# Patient Record
Sex: FEMALE | Race: BLACK OR AFRICAN AMERICAN | HISPANIC OR LATINO | Employment: FULL TIME | URBAN - METROPOLITAN AREA
[De-identification: names, ages, dates, MRNs, and addresses within clinical notes are randomized per-mention and may not be internally consistent; named-entity substitution may affect disease eponyms.]

---

## 2018-10-26 ENCOUNTER — OFFICE VISIT (OUTPATIENT)
Dept: OBSTETRICS AND GYNECOLOGY | Facility: CLINIC | Age: 43
End: 2018-10-26
Attending: OBSTETRICS & GYNECOLOGY
Payer: COMMERCIAL

## 2018-10-26 VITALS
WEIGHT: 188.25 LBS | DIASTOLIC BLOOD PRESSURE: 80 MMHG | BODY MASS INDEX: 32.14 KG/M2 | HEIGHT: 64 IN | SYSTOLIC BLOOD PRESSURE: 118 MMHG

## 2018-10-26 DIAGNOSIS — N85.2 UTERINE ENLARGEMENT: ICD-10-CM

## 2018-10-26 DIAGNOSIS — Z12.4 ENCOUNTER FOR PAPANICOLAOU SMEAR FOR CERVICAL CANCER SCREENING: ICD-10-CM

## 2018-10-26 DIAGNOSIS — Z01.411 ENCOUNTER FOR GYNECOLOGICAL EXAMINATION (GENERAL) (ROUTINE) WITH ABNORMAL FINDINGS: Primary | ICD-10-CM

## 2018-10-26 DIAGNOSIS — Z12.39 BREAST CANCER SCREENING: ICD-10-CM

## 2018-10-26 DIAGNOSIS — Z31.69 ENCOUNTER FOR PRECONCEPTION CONSULTATION: ICD-10-CM

## 2018-10-26 DIAGNOSIS — A64 STD (FEMALE): ICD-10-CM

## 2018-10-26 PROCEDURE — 88175 CYTOPATH C/V AUTO FLUID REDO: CPT

## 2018-10-26 PROCEDURE — 87491 CHLMYD TRACH DNA AMP PROBE: CPT

## 2018-10-26 PROCEDURE — 87624 HPV HI-RISK TYP POOLED RSLT: CPT

## 2018-10-26 PROCEDURE — 99999 PR PBB SHADOW E&M-NEW PATIENT-LVL IV: CPT | Mod: PBBFAC,,, | Performed by: OBSTETRICS & GYNECOLOGY

## 2018-10-26 PROCEDURE — 99386 PREV VISIT NEW AGE 40-64: CPT | Mod: S$GLB,,, | Performed by: OBSTETRICS & GYNECOLOGY

## 2018-10-26 RX ORDER — DEXTROAMPHETAMINE SACCHARATE, AMPHETAMINE ASPARTATE, DEXTROAMPHETAMINE SULFATE AND AMPHETAMINE SULFATE 2.5; 2.5; 2.5; 2.5 MG/1; MG/1; MG/1; MG/1
1 TABLET ORAL 2 TIMES DAILY
Refills: 0 | COMMUNITY
Start: 2018-10-02 | End: 2019-02-06

## 2018-10-26 RX ORDER — FLUOXETINE HYDROCHLORIDE 40 MG/1
80 CAPSULE ORAL DAILY
COMMUNITY
End: 2022-11-10

## 2018-10-26 RX ORDER — ARIPIPRAZOLE 5 MG/1
1 TABLET ORAL DAILY
Refills: 0 | COMMUNITY
Start: 2018-10-02 | End: 2019-02-06

## 2018-10-26 NOTE — PROGRESS NOTES
Subjective:       Patient ID: Desiree Salas is a 43 y.o. female.    Chief Complaint:  Annual Exam (wants info on Fertility and PCP, last pap and hpv normal 1 year ago, lmp 10/19/18, mmg done 18 months ago normal per patient, wants STD check)      History of Present Illness  43 year old here for annual.  Patient reports having regular periods every month lasting 5-7 days.  Not having bad pains or heaviness.  Denies breast concerns.  No pelvic pain.  Patent is 43 and interested in MFM counseling for pregnancy based on her medications.  Patient considering IUI and will follow up with Dr. Jesus.  Reports moods to be much improved on these mediations.  Patient still is having trouble with sleeping but is exercising and trying to reduce stress.      GYN & OB History  Patient's last menstrual period was 10/19/2018.   Date of Last Pap: No result found    OB History    Para Term  AB Living   0 0 0 0 0 0   SAB TAB Ectopic Multiple Live Births   0 0 0 0 0             Past Medical History:   Diagnosis Date    Mental disorder        Past Surgical History:   Procedure Laterality Date    CYST REMOVAL      DILATION AND CURETTAGE OF UTERUS         Review of Systems  Review of Systems   Constitutional: Negative for fatigue.   Respiratory: Negative for shortness of breath.    Cardiovascular: Negative for chest pain.   Gastrointestinal: Negative for abdominal pain, constipation, diarrhea and nausea.   Genitourinary: Negative for dyspareunia, dysuria, menstrual problem, pelvic pain and vaginal bleeding.   Musculoskeletal: Positive for arthralgias. Negative for back pain.   Skin: Negative for rash.   Neurological: Negative for headaches.   Hematological: Negative for adenopathy.   Psychiatric/Behavioral: Positive for sleep disturbance. Negative for dysphoric mood. The patient is not nervous/anxious.         Objective:   Physical Exam:   Constitutional: She is oriented to person, place, and time. She appears  well-developed and well-nourished.      Neck: No thyromegaly present.    Cardiovascular: Normal rate.     Pulmonary/Chest: Effort normal and breath sounds normal. Right breast exhibits no mass, no nipple discharge, no skin change, no tenderness and no bleeding. Left breast exhibits no mass, no nipple discharge, no skin change, no tenderness and no bleeding.       1 cm area of healing blister that patient thinks was caused by a bug  No mass         Abdominal: Soft. Normal appearance and bowel sounds are normal. She exhibits no distension and no mass. There is no tenderness. There is no rebound and no guarding.     Genitourinary: Vagina normal. Pelvic exam was performed with patient supine. There is no rash, tenderness, lesion or injury on the right labia. There is no rash, tenderness, lesion or injury on the left labia. Uterus is enlarged (mild ). Uterus is not fixed and not tender. Cervix is normal. Right adnexum displays no mass, no tenderness and no fullness. Left adnexum displays no mass, no tenderness and no fullness. No erythema, tenderness, rectocele, cystocele or unspecified prolapse of vaginal walls in the vagina. No signs of injury around the vagina. No vaginal discharge found. Cervix exhibits no motion tenderness, no discharge and no friability.           Musculoskeletal: Normal range of motion and moves all extremeties.      Lymphadenopathy:     She has no axillary adenopathy.        Right: No supraclavicular adenopathy present.        Left: No supraclavicular adenopathy present.    Neurological: She is alert and oriented to person, place, and time.    Skin: Skin is warm and dry.    Psychiatric: She has a normal mood and affect. Her behavior is normal. Judgment normal.        Assessment/ Plan:     Encounter for gynecological examination (general) (routine) with abnormal findings    STD (female)  -     C. trachomatis/N. gonorrhoeae by AMP DNA    Encounter for Papanicolaou smear for cervical cancer  screening  -     Liquid-based pap smear, screening  -     HPV High Risk Genotypes, PCR    Encounter for preconception consultation  -     Ambulatory consult to Maternal Fetal Medicine    Breast cancer screening  -     Mammo Digital Screening Bilat w/ Audi; Future; Expected date: 10/26/2018    Uterine enlargement  -     US Pelvis Comp with Transvag NON-OB (xpd; Future; Expected date: 10/26/2018    medication and pregnancy discussed   patient to set appointment with [facility]      Follow-up in about 2 weeks (around 11/9/2018) for ultrasound.    Patient was counseled today on A.C.S. Pap guidelines and recommendations for yearly pelvic exams, mammograms and monthly self breast exams; to see her PCP for other health maintenance.

## 2018-10-27 LAB
C TRACH DNA SPEC QL NAA+PROBE: NOT DETECTED
N GONORRHOEA DNA SPEC QL NAA+PROBE: NOT DETECTED

## 2018-11-01 LAB
HPV HR 12 DNA CVX QL NAA+PROBE: NEGATIVE
HPV16 AG SPEC QL: NEGATIVE
HPV18 DNA SPEC QL NAA+PROBE: NEGATIVE

## 2018-11-02 ENCOUNTER — TELEPHONE (OUTPATIENT)
Dept: OBSTETRICS AND GYNECOLOGY | Facility: CLINIC | Age: 43
End: 2018-11-02

## 2018-11-02 NOTE — TELEPHONE ENCOUNTER
----- Message from Heena Castaneda MD sent at 11/2/2018 12:43 PM CDT -----  Pap and hpv were normal

## 2018-11-09 DIAGNOSIS — N85.2 UTERINE ENLARGEMENT: Primary | ICD-10-CM

## 2018-11-15 ENCOUNTER — OFFICE VISIT (OUTPATIENT)
Dept: INTERNAL MEDICINE | Facility: CLINIC | Age: 43
End: 2018-11-15
Payer: COMMERCIAL

## 2018-11-15 VITALS
WEIGHT: 187.63 LBS | OXYGEN SATURATION: 98 % | HEIGHT: 64 IN | BODY MASS INDEX: 32.03 KG/M2 | DIASTOLIC BLOOD PRESSURE: 68 MMHG | SYSTOLIC BLOOD PRESSURE: 102 MMHG | HEART RATE: 80 BPM

## 2018-11-15 DIAGNOSIS — Z00.00 ANNUAL PHYSICAL EXAM: Primary | ICD-10-CM

## 2018-11-15 DIAGNOSIS — F32.A DEPRESSION, UNSPECIFIED DEPRESSION TYPE: ICD-10-CM

## 2018-11-15 DIAGNOSIS — F90.0 ATTENTION DEFICIT HYPERACTIVITY DISORDER (ADHD), PREDOMINANTLY INATTENTIVE TYPE: ICD-10-CM

## 2018-11-15 DIAGNOSIS — Z13.220 SCREENING FOR LIPID DISORDERS: ICD-10-CM

## 2018-11-15 DIAGNOSIS — Z13.1 SCREENING FOR DIABETES MELLITUS: ICD-10-CM

## 2018-11-15 DIAGNOSIS — F41.9 ANXIETY DISORDER, UNSPECIFIED TYPE: ICD-10-CM

## 2018-11-15 PROCEDURE — 99999 PR PBB SHADOW E&M-EST. PATIENT-LVL III: CPT | Mod: PBBFAC,,, | Performed by: FAMILY MEDICINE

## 2018-11-15 PROCEDURE — 99386 PREV VISIT NEW AGE 40-64: CPT | Mod: S$GLB,,, | Performed by: FAMILY MEDICINE

## 2018-11-15 RX ORDER — FLUOXETINE 10 MG/1
CAPSULE ORAL
Refills: 2 | COMMUNITY
Start: 2018-11-05 | End: 2022-10-06

## 2018-11-15 RX ORDER — GLUCOSAMINE/CHONDRO SU A 500-400 MG
1 TABLET ORAL DAILY
COMMUNITY
End: 2020-09-23

## 2018-11-15 NOTE — LETTER
November 15, 2018      Heena Castaneda MD  [address]  [address]  [facility] [address]           [facility]  [address]  [place] [address]  Phone: [phone]  Fax: [phone]          Patient: Desiree Salas   MR Number: [id number]   YOB: 1975   Date of Visit: 11/15/2018       Dear Dr. Heena Castaneda:    Thank you for referring Desiree Salas to me for evaluation. Attached you will find relevant portions of my assessment and plan of care.    If you have questions, please do not hesitate to call me. I look forward to following Desiree Salas along with you.    Sincerely,    Tiffanie Lemos MD    Enclosure  CC:  No Recipients    If you would like to receive this communication electronically, please contact [email or web address] or [phone] to request more information on [employer] Link access.    For providers and/or their staff who would like to refer a patient to [facility], please contact us through our one-stop-shop provider referral line, [facility], at [phone].    If you feel you have received this communication in error or would no longer like to receive these types of communications, please e-mail [email or web address]

## 2018-11-15 NOTE — PROGRESS NOTES
Subjective:       Patient ID: Desiree Salas is a 43 y.o. female.    Chief Complaint: Establish Care    HPI  This patient is new to me.   Desiree Salas is a 43 y.o. year old female with depression, ADHD, anxiety who presents today to establish care.      She is interested in IUI. Seeing MFM next week. Also working with Dr. Marycarmen Edwards at [facility].     Anxiety, depression, ADHD (adult-onset, inattentive) - symptoms controlled. Sees psychiatry and psychology. Takes Prozac 80mg daily and an extra 10mg around menstrual cycle. Also taking Abilify and Adderall. Deplin vitamins as well.     Says she had flu vaccine 10/2018  Tetanus vaccine in  per patient? No records available.    OB/GYN History    (miscarriage at age 16)  LMP: 10/19/18  Sexually active: yes  Contraception: none  Last Pap smear: 10/26/18 - normal cytology, negative high risk HPV  Mammogram: May 2017 - normal (dense breasts) - done at [facility]    I personally reviewed Past Medical History, Past Surgical History, Social History, and Family History    Review of Systems   Constitutional: Negative for chills, fatigue, fever and unexpected weight change.   HENT: Negative for congestion, hearing loss, rhinorrhea and sore throat.    Eyes: Negative for visual disturbance.   Respiratory: Negative for cough, shortness of breath and wheezing.    Cardiovascular: Negative for chest pain, palpitations and leg swelling.   Gastrointestinal: Negative for abdominal pain, constipation, diarrhea, nausea and vomiting.   Genitourinary: Negative for dysuria, frequency, menstrual problem and urgency.   Musculoskeletal: Negative for arthralgias and myalgias.        +occasional bilateral knee pain with activity    Skin: Negative for rash.   Neurological: Negative for dizziness, syncope and headaches.   Psychiatric/Behavioral: Negative for dysphoric mood and sleep disturbance. The patient is not nervous/anxious.        Objective:      Vitals:    11/15/18 1543   BP:  "102/68   Pulse: 80   SpO2: 98%   Weight: 85.1 kg (187 lb 9.8 oz)   Height: 5' 4" (1.626 m)     Physical Exam   Constitutional: She is oriented to person, place, and time. She appears well-developed and well-nourished. No distress.   HENT:   Head: Normocephalic and atraumatic.   Right Ear: Hearing, tympanic membrane, external ear and ear canal normal.   Left Ear: Hearing, tympanic membrane, external ear and ear canal normal.   Nose: Nose normal.   Mouth/Throat: Oropharynx is clear and moist and mucous membranes are normal. No oropharyngeal exudate.   Eyes: Conjunctivae and lids are normal. Pupils are equal, round, and reactive to light.   Neck: Normal range of motion. No thyroid mass and no thyromegaly present.   Cardiovascular: Normal rate, regular rhythm, S1 normal, S2 normal and intact distal pulses.   No murmur heard.  No lower extremity edema.    Pulmonary/Chest: Effort normal and breath sounds normal. No respiratory distress.   Abdominal: Soft. Normal appearance and bowel sounds are normal. There is no tenderness.   Musculoskeletal:        Right knee: No tenderness found.        Left knee: No tenderness found.   No crepitus is knees bilaterally. Strength 5/5 in LE bilaterally.    Lymphadenopathy:     She has no cervical adenopathy.        Right: No supraclavicular adenopathy present.        Left: No supraclavicular adenopathy present.   Neurological: She is alert and oriented to person, place, and time.   Skin: Skin is warm and dry. No rash noted.        Psychiatric: She has a normal mood and affect. Her behavior is normal. Thought content normal.   Nursing note and vitals reviewed.      Assessment:       1. Annual physical exam    2. Anxiety disorder, unspecified type    3. Depression, unspecified depression type    4. Attention deficit hyperactivity disorder (ADHD), predominantly inattentive type    5. Screening for lipid disorders    6. Screening for diabetes mellitus        Plan:   Desiree was seen today " for establish care.    Diagnoses and all orders for this visit:    Annual physical exam        - Screening labs ordered. Discussed healthy diet and exercise. She is up to date with Pap smear and mammogram.   -     CBC auto differential; Future  -     Comprehensive metabolic panel; Future  -     Lipid panel; Future  -     TSH; Future    Anxiety disorder, unspecified type  Depression, unspecified depression type  Attention deficit hyperactivity disorder (ADHD), predominantly inattentive type        - Stable. Continue current management per psychiatry.     Screening for lipid disorders  -     Lipid panel; Future    Screening for diabetes mellitus  -     Comprehensive metabolic panel; Future

## 2018-11-20 ENCOUNTER — INITIAL CONSULT (OUTPATIENT)
Dept: MATERNAL FETAL MEDICINE | Facility: CLINIC | Age: 43
End: 2018-11-20
Attending: OBSTETRICS & GYNECOLOGY
Payer: COMMERCIAL

## 2018-11-20 ENCOUNTER — TELEPHONE (OUTPATIENT)
Dept: INTERNAL MEDICINE | Facility: CLINIC | Age: 43
End: 2018-11-20

## 2018-11-20 ENCOUNTER — HOSPITAL ENCOUNTER (OUTPATIENT)
Dept: RADIOLOGY | Facility: OTHER | Age: 43
Discharge: HOME | End: 2018-11-20
Attending: PLASTIC SURGERY
Payer: COMMERCIAL

## 2018-11-20 VITALS
DIASTOLIC BLOOD PRESSURE: 78 MMHG | BODY MASS INDEX: 32.47 KG/M2 | SYSTOLIC BLOOD PRESSURE: 128 MMHG | WEIGHT: 189.13 LBS

## 2018-11-20 DIAGNOSIS — Z31.69 ENCOUNTER FOR PRECONCEPTION CONSULTATION: ICD-10-CM

## 2018-11-20 DIAGNOSIS — F33.9 DEPRESSION, RECURRENT: Primary | ICD-10-CM

## 2018-11-20 DIAGNOSIS — G56.00 CARPAL TUNNEL SYNDROME: ICD-10-CM

## 2018-11-20 PROCEDURE — 73130 X-RAY EXAM OF HAND: CPT | Mod: 50,TC,FY

## 2018-11-20 PROCEDURE — 99999 PR PBB SHADOW E&M-EST. PATIENT-LVL III: CPT | Mod: PBBFAC,,, | Performed by: OBSTETRICS & GYNECOLOGY

## 2018-11-20 PROCEDURE — 3008F BODY MASS INDEX DOCD: CPT | Mod: CPTII,S$GLB,, | Performed by: OBSTETRICS & GYNECOLOGY

## 2018-11-20 PROCEDURE — 99214 OFFICE O/P EST MOD 30 MIN: CPT | Mod: S$GLB,,, | Performed by: OBSTETRICS & GYNECOLOGY

## 2018-11-20 PROCEDURE — 73130 X-RAY EXAM OF HAND: CPT | Mod: 26,RT,, | Performed by: RADIOLOGY

## 2018-11-20 PROCEDURE — 73130 X-RAY EXAM OF HAND: CPT | Mod: 26,59,LT, | Performed by: RADIOLOGY

## 2018-11-20 NOTE — TELEPHONE ENCOUNTER
Please call patient and inform that all of her blood work came back normal. Blood count, thyroid, liver and kidney function, and cholesterol all normal.    Please let me know if she has questions.    thanks

## 2018-11-20 NOTE — LETTER
November 21, 2018      Heena Castaneda MD  [address]  [address]  [place] [address]           [facility]  [address]  [facility] [address]  Phone: [phone]          Patient: Desiree Salas   MR Number: [id number]   YOB: 1975   Date of Visit: 11/20/2018       Dear Dr. Heena Castaneda:    Thank you for referring Desiree Salas to me for evaluation. Attached you will find relevant portions of my assessment and plan of care.    If you have questions, please do not hesitate to call me. I look forward to following Desiree Salas along with you.    Sincerely,    Bonifacio Carmona MD    Enclosure  CC:  No Recipients    If you would like to receive this communication electronically, please contact [email or web address] or [phone] to request more information on [employer] Link access.    For providers and/or their staff who would like to refer a patient to [facility], please contact us through our one-stop-shop provider referral line, [facility], at [phone].    If you feel you have received this communication in error or would no longer like to receive these types of communications, please e-mail [email or web address]

## 2018-11-21 ENCOUNTER — PATIENT MESSAGE (OUTPATIENT)
Dept: INTERNAL MEDICINE | Facility: CLINIC | Age: 43
End: 2018-11-21

## 2018-11-21 ENCOUNTER — TELEPHONE (OUTPATIENT)
Dept: ORTHOPEDICS | Facility: CLINIC | Age: 43
End: 2018-11-21

## 2018-11-21 NOTE — TELEPHONE ENCOUNTER
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 Pt requesting results of xray report. Informed pt  is out until next week so she want be able to get anything until he gets back to the office on Wednesday. Pt states she will call the Plastics office to get more information in regards to the xray report. Pt [facility] seen  [facility].Offered pt appt she declined at this time.

## 2018-11-21 NOTE — TELEPHONE ENCOUNTER
----- Message from Alyssa Welsh sent at 11/21/2018  2:58 PM CST -----  Contact: MARIA ISABEL YOUNGBLOOD [[id number]]   Name of Who is Calling:  MARIA ISABEL YOUNGBLOOD [[id number]]        What is the request in detail: Please advise pt of status of 11.20.18 x ray results. Please contact and advise.          Can the clinic reply by [facility]: No    [facility]: [phone]

## 2018-11-21 NOTE — TELEPHONE ENCOUNTER
Called pt and informed her that all of her blood work came back normal. Blood count, thyroid, liver and kidney function, and cholesterol all normal.   pt showed verbal understanding

## 2018-11-21 NOTE — PROGRESS NOTES
Subjective:       Patient ID: Desiree Salas is a 43 y.o. female.    Chief Complaint: Pre Pregnancy Consult        Desiree Salas is a 43-year-old  0 who was seen in pre conception counseling as she is contemplating going through fertility treatments and for discussion of her medication effects on the fetus.    She has a history of anxiety, depression, and ADHD for which she is currently on Abilify, Adderall, and Prozac.  She is currently seeing the Dr. Borja at [facility] and is considering whether she wants to go through ovulation induction and IUI.    Her past medical history is otherwise unremarkable.    Past surgical history is notable for a D&C in college because of abnormal periods as well as a prior surgery for a cyst removal.    Social:  She is currently smoking but plans to stop if she decides to proceed with pregnancy.    Family history is negative for birth defects or genetic disorders.    I discussed issues related to pregnancy at age 43.  We discussed the increased incidence of preeclampsia, gestational diabetes, growth restriction, stillbirth, and  delivery.  We also discussed the increased risk of chromosome abnormalities and I explained that at age 44 the risk of a chromosome abnormality is approximately 1 in 50.  Screening and diagnostic testing would be made available and would be recommended in any subsequent pregnancy if she were interested in finding out more information on the status of the fetus.  Low-dose aspirin would be recommended in any subsequent pregnancy after the 1st trimester to decrease the risk of preeclampsia.  In addition in all women over 40,  testing weekly beginning at 32 weeks is recommended due to the increased risk of placental dysfunction and stillbirth.    I discussed the data on each of her medications and the association with birth defects and  effects.  Abilify has not been associated with any specific pattern of malformations in human  pregnancies.  In addition it is not been associated with any  affects.  It can cause hyperprolactinemia and so therefore I explained to her that it could have an affect on her ability to get pregnant.  It is not been associated with any specific neuro developmental abnormalities but there are a few reports of a  type withdrawal.  With regard to Prozac, I explained to her that there is a lot of conflicting literature out there about the association with structural abnormalities, especially cardiac defects.  The most recent literature suggest that there is not a strong association with cardiac defects with use of this medication.  There is however a mild transient  syndrome of jitteriness and serotonergic symptoms that have been described.  There is also been some description of transient pulmonary hypertension in infants exposed in the 3rd trimester, but long-term neuro developmental affects have not been described.  With regard to her Adderall, I explained that use of this medication during pregnancy has been associated with intrauterine growth abnormalities as well as some  behavioral and CNS affects such as jitteriness and a withdrawal type picture.  There are a few reports of long-term neurodevelopmental issues but these seem to be associated with very high doses and with abuse as opposed to the therapeutic use of amphetamines.  I counseled her that overall there has to be a risk benefit consideration of her mental health benefits versus the risks to the fetus.  Of all the medications, the 1 that seems to have the greatest association with adverse fetal affect is the Adderall and so if she could reduce the dose once pregnant that might help mitigate some of the risk.  I counseled her however that 1 of the greatest risks for postpartum depression, including a psychotic episode, is poorly controlled maternal depression during pregnancy and this can be worsened by an anxiety disorder  in addition to the depression.  For that reason I think stabilizing her from a mental health standpoint will be critical through pregnancy.    We also discussed overall approaches to optimizing pregnancy outcome including adopting a healthy lifestyle, adopting a healthy diet, and introducing exercise into a regular routine as part of her lifestyle.    She was provided with an opportunity to ask questions.  I overall spent approximately 30 min in face-to-face time essentially all of which was spent in counseling.    Thank you for allowing me to participate in her care.  If I can answer any questions or if any issues arise that I can assist with, please do not hesitate to call.    Bonifacio Carmona Jr., MD  Maternal Fetal Medicine

## 2018-11-22 ENCOUNTER — PATIENT MESSAGE (OUTPATIENT)
Dept: INTERNAL MEDICINE | Facility: CLINIC | Age: 43
End: 2018-11-22

## 2018-11-22 DIAGNOSIS — H91.90 HEARING DIFFICULTY, UNSPECIFIED LATERALITY: Primary | ICD-10-CM

## 2018-11-23 ENCOUNTER — PATIENT MESSAGE (OUTPATIENT)
Dept: INTERNAL MEDICINE | Facility: CLINIC | Age: 43
End: 2018-11-23

## 2018-11-28 ENCOUNTER — PATIENT MESSAGE (OUTPATIENT)
Dept: INTERNAL MEDICINE | Facility: CLINIC | Age: 43
End: 2018-11-28

## 2018-12-03 ENCOUNTER — CLINICAL SUPPORT (OUTPATIENT)
Dept: OTOLARYNGOLOGY | Facility: CLINIC | Age: 43
End: 2018-12-03
Payer: COMMERCIAL

## 2018-12-03 DIAGNOSIS — H90.3 BILATERAL SENSORINEURAL HEARING LOSS: Primary | ICD-10-CM

## 2018-12-03 DIAGNOSIS — Z77.122 HISTORY OF EXPOSURE TO NOISE: ICD-10-CM

## 2018-12-03 DIAGNOSIS — R29.2 ABNORMAL ACOUSTIC REFLEX: ICD-10-CM

## 2018-12-03 PROCEDURE — 92557 COMPREHENSIVE HEARING TEST: CPT | Mod: S$GLB,,, | Performed by: AUDIOLOGIST-HEARING AID FITTER

## 2018-12-03 PROCEDURE — 92550 TYMPANOMETRY & REFLEX THRESH: CPT | Mod: S$GLB,,, | Performed by: AUDIOLOGIST-HEARING AID FITTER

## 2018-12-03 NOTE — Clinical Note
Dr. Lemos,Thank you for referring Desiree Salas to me for an audiological evaluation.  I have enclosed my assessment and recommendations.  Of note, Ms. Salas is an excellent candidate for hearing aids, and would benefit from a Hearing Aid Consult and subsequent trial with some devices.  An in-office demo has been offered to her should she decide to schedule another appointment.  During the audiogram, Ms. Salas mentioned that she feels she struggles with chronic sinus and allergy issues.  I recommended that she consult with an ENT physician about this, and she would like a referral to ENT.Please accept my apologies for the delay, as I thought I sent this weeks ago.  I did not realize it had slipped through the cracks until the patient reached out to me to request a referral to ENT.If you have any questions or concerns, please do not hesitate to reach out to me.Sincerely,Yue Bermeo, CCC-A

## 2018-12-14 ENCOUNTER — ANESTHESIA EVENT (OUTPATIENT)
Dept: SURGERY | Facility: OTHER | Age: 43
End: 2018-12-14
Payer: COMMERCIAL

## 2018-12-14 ENCOUNTER — HOSPITAL ENCOUNTER (OUTPATIENT)
Dept: PREADMISSION TESTING | Facility: OTHER | Age: 43
Discharge: HOME | End: 2018-12-14
Attending: PLASTIC SURGERY
Payer: COMMERCIAL

## 2018-12-14 VITALS
HEIGHT: 64 IN | SYSTOLIC BLOOD PRESSURE: 112 MMHG | DIASTOLIC BLOOD PRESSURE: 58 MMHG | OXYGEN SATURATION: 98 % | TEMPERATURE: 99 F | HEART RATE: 64 BPM | WEIGHT: 191.81 LBS | BODY MASS INDEX: 32.74 KG/M2

## 2018-12-14 RX ORDER — PREGABALIN 75 MG/1
150 CAPSULE ORAL
Status: DISCONTINUED | OUTPATIENT
Start: 2018-12-18 | End: 2018-12-15 | Stop reason: HOSPADM

## 2018-12-14 RX ORDER — FAMOTIDINE 20 MG/1
20 TABLET, FILM COATED ORAL
Status: CANCELLED | OUTPATIENT
Start: 2018-12-14 | End: 2018-12-14

## 2018-12-14 RX ORDER — SODIUM CHLORIDE, SODIUM LACTATE, POTASSIUM CHLORIDE, CALCIUM CHLORIDE 600; 310; 30; 20 MG/100ML; MG/100ML; MG/100ML; MG/100ML
INJECTION, SOLUTION INTRAVENOUS CONTINUOUS
Status: CANCELLED | OUTPATIENT
Start: 2018-12-14

## 2018-12-14 NOTE — PRE ADMISSION SCREENING
Called [facility] office for patient consent for surgery scheduled 12/18/18.  Spoke with Deepti, states she will fax consent

## 2018-12-14 NOTE — DISCHARGE INSTRUCTIONS
PRE-ADMIT TESTING -  [phone]    [address]  [place] [facility]          Your surgery has been scheduled at [facility]. We are pleased to have the opportunity to serve you. For Further Information please call [phone].    On the day of surgery please report to the Information Desk on the 1st floor.    · CONTACT YOUR PHYSICIAN'S OFFICE THE DAY PRIOR TO YOUR SURGERY TO OBTAIN YOUR ARRIVAL TIME.     · The evening before surgery do not eat anything after 9 p.m. ( this includes hard candy, chewing gum and mints).  You may only have GATORADE, POWERADE AND WATER  from 9 p.m. until you leave your home.   DO NOT DRINK ANY LIQUIDS ON THE WAY TO THE HOSPITAL.      SPECIAL MEDICATION INSTRUCTIONS: TAKE medications checked off by the Anesthesiologist on your Medication List.    Angiogram Patients: Take medications as instructed by your physician, including aspirin.     Surgery Patients:    If you take ASPIRIN - Your PHYSICIAN/SURGEON will need to inform you IF/OR when you need to stop taking aspirin prior to your surgery.     Do Not take any medications containing IBUPROFEN.  Do Not Wear any make-up or dark nail polish   (especially eye make-up) to surgery. If you come to surgery with makeup on you will be required to remove the makeup or nail polish.    Do not shave your surgical area at least 5 days prior to your surgery. The surgical prep will be performed at the hospital according to Infection Control regulations.    Leave all valuables at home.   Do Not wear any jewelry or watches, including any metal in body piercings.  Contact Lens must be removed before surgery. Either do not wear the contact lens or bring a case and solution for storage.  Please bring a container for eyeglasses or dentures as required.  Bring any paperwork your physician has provided, such as consent forms,  history and physicals, doctor's orders, etc.   Bring comfortable clothes that are loose fitting to wear upon  discharge. Take into consideration the type of surgery being performed.  Maintain your diet as advised per your physician the day prior to surgery.      Adequate rest the night before surgery is advised.   Park in the Parking lot behind the hospital or in the [facility] across the street from the parking lot. Parking is complimentary.  If you will be discharged the same day as your procedure, please arrange for a responsible adult to drive you home or to accompany you if traveling by taxi.   YOU WILL NOT BE PERMITTED TO DRIVE OR TO LEAVE THE HOSPITAL ALONE AFTER SURGERY.   It is strongly recommended that you arrange for someone to remain with you for the first 24 hrs following your surgery.       Thank you for your cooperation.  The Staff of [facility].        Bathing Instructions                                                                 Please shower the evening before and morning of your procedure with    ANTIBACTERIAL SOAP. ( DIAL, etc )  Concentrate on the surgical area   for at least 3 minutes and rinse completely. Dry off as usual.   Do not use any deodorant, powder, body lotions, perfume, after shave or    cologne.

## 2018-12-14 NOTE — ANESTHESIA PREPROCEDURE EVALUATION
12/14/2018  Desiree Salas is a 43 y.o., female.    Anesthesia Evaluation    I have reviewed the Patient Summary Reports.    I have reviewed the Nursing Notes.   I have reviewed the Medications.     Review of Systems  Anesthesia Hx:  No problems with previous Anesthesia  Denies Family Hx of Anesthesia complications.   Denies Personal Hx of Anesthesia complications.   Social:  Non-Smoker    Hematology/Oncology:  Hematology Normal   Oncology Normal     Cardiovascular:  Cardiovascular Normal     Pulmonary:   As child had exercise induced asthma   Renal/:  Renal/ Normal     Hepatic/GI:  Hepatic/GI Normal    Musculoskeletal:  Musculoskeletal Normal    Neurological:  Neurology Normal    Endocrine:  Endocrine Normal    Psych:   Psychiatric History          Physical Exam  General:  Well nourished    Airway/Jaw/Neck:  Airway Findings: Mouth Opening: Normal Tongue: Normal  General Airway Assessment: Adult  Mallampati: I  TM Distance: Normal, at least 6 cm         Dental:  Dental Findings: In tact             Anesthesia Plan  Type of Anesthesia, risks & benefits discussed:  Anesthesia Type:  general  Patient's Preference:   Intra-op Monitoring Plan: standard ASA monitors  Intra-op Monitoring Plan Comments:   Post Op Pain Control Plan: per primary service following discharge from PACU  Post Op Pain Control Plan Comments:   Induction:   IV  Beta Blocker:         Informed Consent: Patient understands risks and agrees with Anesthesia plan.  Questions answered. Anesthesia consent signed with patient.  ASA Score: 2     Day of Surgery Review of History & Physical:    H&P update referred to the surgeon.         Ready For Surgery From Anesthesia Perspective.

## 2018-12-15 ENCOUNTER — PATIENT MESSAGE (OUTPATIENT)
Dept: OTOLARYNGOLOGY | Facility: CLINIC | Age: 43
End: 2018-12-15

## 2018-12-18 ENCOUNTER — HOSPITAL ENCOUNTER (OUTPATIENT)
Facility: OTHER | Age: 43
Discharge: HOME | End: 2018-12-18
Attending: PLASTIC SURGERY | Admitting: PLASTIC SURGERY
Payer: COMMERCIAL

## 2018-12-18 ENCOUNTER — ANESTHESIA (OUTPATIENT)
Dept: SURGERY | Facility: OTHER | Age: 43
End: 2018-12-18
Payer: COMMERCIAL

## 2018-12-18 VITALS
DIASTOLIC BLOOD PRESSURE: 60 MMHG | OXYGEN SATURATION: 99 % | BODY MASS INDEX: 32.74 KG/M2 | TEMPERATURE: 98 F | SYSTOLIC BLOOD PRESSURE: 115 MMHG | RESPIRATION RATE: 18 BRPM | WEIGHT: 191.81 LBS | HEIGHT: 64 IN | HEART RATE: 71 BPM

## 2018-12-18 DIAGNOSIS — M67.432 GANGLION CYST OF WRIST, LEFT: Primary | ICD-10-CM

## 2018-12-18 DIAGNOSIS — M67.432 GANGLION OF LEFT WRIST: ICD-10-CM

## 2018-12-18 LAB
B-HCG UR QL: NEGATIVE
CTP QC/QA: YES

## 2018-12-18 PROCEDURE — 63600175 PHARM REV CODE 636 W HCPCS: Performed by: PLASTIC SURGERY

## 2018-12-18 PROCEDURE — 63600175 PHARM REV CODE 636 W HCPCS: Performed by: NURSE ANESTHETIST, CERTIFIED REGISTERED

## 2018-12-18 PROCEDURE — 81025 URINE PREGNANCY TEST: CPT | Performed by: ANESTHESIOLOGY

## 2018-12-18 PROCEDURE — 25000003 PHARM REV CODE 250: Performed by: ANESTHESIOLOGY

## 2018-12-18 PROCEDURE — 36000707: Performed by: PLASTIC SURGERY

## 2018-12-18 PROCEDURE — 36000706: Performed by: PLASTIC SURGERY

## 2018-12-18 PROCEDURE — 25000003 PHARM REV CODE 250: Performed by: PLASTIC SURGERY

## 2018-12-18 PROCEDURE — 71000015 HC POSTOP RECOV 1ST HR: Performed by: PLASTIC SURGERY

## 2018-12-18 PROCEDURE — 37000008 HC ANESTHESIA 1ST 15 MINUTES: Performed by: PLASTIC SURGERY

## 2018-12-18 PROCEDURE — 37000009 HC ANESTHESIA EA ADD 15 MINS: Performed by: PLASTIC SURGERY

## 2018-12-18 RX ORDER — ONDANSETRON 2 MG/ML
4 INJECTION INTRAMUSCULAR; INTRAVENOUS DAILY PRN
Status: DISCONTINUED | OUTPATIENT
Start: 2018-12-18 | End: 2018-12-18 | Stop reason: HOSPADM

## 2018-12-18 RX ORDER — ONDANSETRON 2 MG/ML
INJECTION INTRAMUSCULAR; INTRAVENOUS
Status: DISCONTINUED | OUTPATIENT
Start: 2018-12-18 | End: 2018-12-18

## 2018-12-18 RX ORDER — SODIUM CHLORIDE, SODIUM LACTATE, POTASSIUM CHLORIDE, CALCIUM CHLORIDE 600; 310; 30; 20 MG/100ML; MG/100ML; MG/100ML; MG/100ML
INJECTION, SOLUTION INTRAVENOUS CONTINUOUS
Status: DISCONTINUED | OUTPATIENT
Start: 2018-12-18 | End: 2018-12-18 | Stop reason: HOSPADM

## 2018-12-18 RX ORDER — PROPOFOL 10 MG/ML
VIAL (ML) INTRAVENOUS
Status: DISCONTINUED | OUTPATIENT
Start: 2018-12-18 | End: 2018-12-18

## 2018-12-18 RX ORDER — SODIUM CHLORIDE 0.9 % (FLUSH) 0.9 %
5 SYRINGE (ML) INJECTION
Status: DISCONTINUED | OUTPATIENT
Start: 2018-12-18 | End: 2018-12-18 | Stop reason: HOSPADM

## 2018-12-18 RX ORDER — HYDROCODONE BITARTRATE AND ACETAMINOPHEN 5; 325 MG/1; MG/1
1 TABLET ORAL EVERY 4 HOURS PRN
Status: DISCONTINUED | OUTPATIENT
Start: 2018-12-18 | End: 2018-12-18 | Stop reason: HOSPADM

## 2018-12-18 RX ORDER — CEFAZOLIN SODIUM 1 G/3ML
2 INJECTION, POWDER, FOR SOLUTION INTRAMUSCULAR; INTRAVENOUS
Status: COMPLETED | OUTPATIENT
Start: 2018-12-18 | End: 2018-12-18

## 2018-12-18 RX ORDER — HYDROCODONE BITARTRATE AND ACETAMINOPHEN 5; 325 MG/1; MG/1
1 TABLET ORAL EVERY 6 HOURS PRN
Qty: 20 TABLET | Refills: 0 | Status: SHIPPED | OUTPATIENT
Start: 2018-12-18 | End: 2019-02-06

## 2018-12-18 RX ORDER — MUPIROCIN 20 MG/G
1 OINTMENT TOPICAL 2 TIMES DAILY
Status: DISCONTINUED | OUTPATIENT
Start: 2018-12-18 | End: 2018-12-18 | Stop reason: HOSPADM

## 2018-12-18 RX ORDER — SODIUM CHLORIDE 0.9 % (FLUSH) 0.9 %
3 SYRINGE (ML) INJECTION
Status: DISCONTINUED | OUTPATIENT
Start: 2018-12-18 | End: 2018-12-18 | Stop reason: HOSPADM

## 2018-12-18 RX ORDER — LIDOCAINE HYDROCHLORIDE 10 MG/ML
INJECTION INFILTRATION; PERINEURAL
Status: DISCONTINUED | OUTPATIENT
Start: 2018-12-18 | End: 2018-12-18 | Stop reason: HOSPADM

## 2018-12-18 RX ORDER — FENTANYL CITRATE 50 UG/ML
INJECTION, SOLUTION INTRAMUSCULAR; INTRAVENOUS
Status: DISCONTINUED | OUTPATIENT
Start: 2018-12-18 | End: 2018-12-18

## 2018-12-18 RX ORDER — FENTANYL CITRATE 50 UG/ML
25 INJECTION, SOLUTION INTRAMUSCULAR; INTRAVENOUS EVERY 5 MIN PRN
Status: DISCONTINUED | OUTPATIENT
Start: 2018-12-18 | End: 2018-12-18 | Stop reason: HOSPADM

## 2018-12-18 RX ORDER — LIDOCAINE HCL/PF 100 MG/5ML
SYRINGE (ML) INTRAVENOUS
Status: DISCONTINUED | OUTPATIENT
Start: 2018-12-18 | End: 2018-12-18

## 2018-12-18 RX ORDER — BACITRACIN 50000 [IU]/1
INJECTION, POWDER, FOR SOLUTION INTRAMUSCULAR
Status: DISCONTINUED | OUTPATIENT
Start: 2018-12-18 | End: 2018-12-18 | Stop reason: HOSPADM

## 2018-12-18 RX ORDER — BUPIVACAINE HYDROCHLORIDE 2.5 MG/ML
INJECTION, SOLUTION EPIDURAL; INFILTRATION; INTRACAUDAL
Status: DISCONTINUED | OUTPATIENT
Start: 2018-12-18 | End: 2018-12-18 | Stop reason: HOSPADM

## 2018-12-18 RX ORDER — MIDAZOLAM HYDROCHLORIDE 1 MG/ML
INJECTION INTRAMUSCULAR; INTRAVENOUS
Status: DISCONTINUED | OUTPATIENT
Start: 2018-12-18 | End: 2018-12-18

## 2018-12-18 RX ORDER — FAMOTIDINE 20 MG/1
20 TABLET, FILM COATED ORAL
Status: COMPLETED | OUTPATIENT
Start: 2018-12-18 | End: 2018-12-18

## 2018-12-18 RX ORDER — OXYCODONE HYDROCHLORIDE 5 MG/1
5 TABLET ORAL
Status: DISCONTINUED | OUTPATIENT
Start: 2018-12-18 | End: 2018-12-18 | Stop reason: HOSPADM

## 2018-12-18 RX ORDER — MEPERIDINE HYDROCHLORIDE 50 MG/ML
12.5 INJECTION INTRAMUSCULAR; INTRAVENOUS; SUBCUTANEOUS ONCE AS NEEDED
Status: DISCONTINUED | OUTPATIENT
Start: 2018-12-18 | End: 2018-12-18 | Stop reason: HOSPADM

## 2018-12-18 RX ORDER — PROPOFOL 10 MG/ML
VIAL (ML) INTRAVENOUS CONTINUOUS PRN
Status: DISCONTINUED | OUTPATIENT
Start: 2018-12-18 | End: 2018-12-18

## 2018-12-18 RX ADMIN — SODIUM CHLORIDE, SODIUM LACTATE, POTASSIUM CHLORIDE, AND CALCIUM CHLORIDE: 600; 310; 30; 20 INJECTION, SOLUTION INTRAVENOUS at 02:12

## 2018-12-18 RX ADMIN — CEFAZOLIN 2 G: 330 INJECTION, POWDER, FOR SOLUTION INTRAMUSCULAR; INTRAVENOUS at 02:12

## 2018-12-18 RX ADMIN — ONDANSETRON 4 MG: 2 INJECTION INTRAMUSCULAR; INTRAVENOUS at 03:12

## 2018-12-18 RX ADMIN — FAMOTIDINE 20 MG: 20 TABLET, FILM COATED ORAL at 01:12

## 2018-12-18 RX ADMIN — PROPOFOL 100 MCG/KG/MIN: 10 INJECTION, EMULSION INTRAVENOUS at 02:12

## 2018-12-18 RX ADMIN — MIDAZOLAM HYDROCHLORIDE 2 MG: 1 INJECTION, SOLUTION INTRAMUSCULAR; INTRAVENOUS at 02:12

## 2018-12-18 RX ADMIN — FENTANYL CITRATE 100 MCG: 50 INJECTION, SOLUTION INTRAMUSCULAR; INTRAVENOUS at 02:12

## 2018-12-18 RX ADMIN — LIDOCAINE HYDROCHLORIDE 50 MG: 20 INJECTION, SOLUTION INTRAVENOUS at 02:12

## 2018-12-18 NOTE — PROGRESS NOTES
Yue Bermeo, CCC-A  Audiologist - [facility] [address] [place] [address]  [email or web address]  [phone]    Patient: Desiree Salas   MRN: [id number]  : 1975  DIANA: 12/3/2018      AUDIOLOGICAL EVALUATION    CASE HISTORY:    Desiree Salas, 43 y.o., was seen on the above date for an audiological evaluation, per Dr. Lemos's orders on 18.       IMPRESSION:   Audiological testing indicated that Desiree Salas has a mild sloping to moderate sensorineural hearing loss in both ears.    RECOMMENDATIONS:   It is recommended that she:  Follow up medically with an ENT physician to assess her report of chronic sinusitis, allergy issues, and also to discuss today's audiometric test results.  Receive binaural hearing aids to improve speech understanding.  Continue to receive audiological monitoring annually.  Use precaution and/or hearing protection in noisy environments.    If you should have any questions or concerns regarding the above information, please do not hesitate to contact me at [phone].      _______________________________  Yue Bermeo, JHONATHAN-A  Audiologist

## 2018-12-18 NOTE — PLAN OF CARE
Desiree Webstermirna Salas has met all discharge criteria from Phase II. Vital Signs are stable, ambulating  without difficulty. Discharge instructions given, patient verbalized understanding. Discharged from facility via wheelchair in stable condition.

## 2018-12-18 NOTE — OP NOTE
Dictation Confirmation Code:[id number]*  Walter Joyner Jr. MD  12/18/2018  3:26 PM      Dictation #1  MRN:[id number]  CSN:[id number]

## 2018-12-18 NOTE — BRIEF OP NOTE
[facility]  Brief Operative Note     SUMMARY     Surgery Date: 12/18/2018     Surgeon(s) and Role:     * Walter Joyner Jr., MD - Primary    Assisting Surgeon: None    Pre-op Diagnosis:  Ganglion of wrist, left [M67.432]    Post-op Diagnosis:  Post-Op Diagnosis Codes:     * Ganglion of wrist, left [M67.432]    Procedure(s) (LRB):  EXCISION, GANGLION CYST, WRIST LEFT (Left)    Anesthesia: General    Description of the findings of the procedure: left dorsal wrist ganglion cyst    Findings/Key Components: same    Estimated Blood Loss: * No values recorded between 12/18/2018  2:57 PM and 12/18/2018  3:25 PM *         Specimens:   Specimen (12h ago, onward)    None          Discharge Note    SUMMARY     Admit Date: 12/18/2018    Discharge Date and Time:  12/18/2018 3:25 PM    Hospital Course (synopsis of major diagnoses, care, treatment, and services provided during the course of the hospital stay): pt admitted for outpatient surgery     Final Diagnosis: Post-Op Diagnosis Codes:     * Ganglion of wrist, left [M67.432]    Disposition: Home or Self Care    Follow Up/Patient Instructions: Leave splint in place until follow up visit in 2 weeks    Medications:  Reconciled Home Medications:      Medication List      START taking these medications    HYDROcodone-acetaminophen 5-325 mg per tablet  Commonly known as:  NORCO  Take 1 tablet by mouth every 6 (six) hours as needed for Pain.        CONTINUE taking these medications    ADVIL COLD AND SINUS ORAL  Take by mouth.     ARIPiprazole 5 MG Tab  Commonly known as:  ABILIFY  Take 1 tablet by mouth once daily.     DEPLIN ORAL  Take 15 mg by mouth once daily.     dextroamphetamine-amphetamine 10 mg Tab  Take 1 tablet by mouth 2 (two) times daily.     * FLUoxetine 40 MG capsule  Take 80 mg by mouth once daily.     * FLUoxetine 10 MG capsule  TK ONE C PO  DAILY ON DAYS 14-28 OF CYCLE PLUS FIRST DAY OF MENSTRUATION     glucosamine-chondroitin 500-400 mg  tablet  Take 1 tablet by mouth once daily.     multivitamin with minerals tablet  Take 1 tablet by mouth once daily.     PRENATAL MULTIVITAMINS ORAL  Take 1 tablet by mouth once daily.         * This list has 2 medication(s) that are the same as other medications prescribed for you. Read the directions carefully, and ask your doctor or other care provider to review them with you.              Discharge Procedure Orders   Diet general     Call MD for:  temperature >100.4     Call MD for:  persistent nausea and vomiting     Call MD for:  severe uncontrolled pain     Call MD for:  difficulty breathing, headache or visual disturbances     Call MD for:  redness, tenderness, or signs of infection (pain, swelling, redness, odor or green/yellow discharge around incision site)     Call MD for:  hives     Call MD for:  persistent dizziness or light-headedness     Call MD for:  extreme fatigue     No driving, operating heavy equipment or signing legal documents while taking pain medication.     Keep surgical extremity elevated     Lifting restrictions     Ice to affected area     Leave dressing on - Keep it clean, dry, and intact until clinic visit     Follow-up Information     Walter Joyner Jr, MD In 2 weeks.    Specialties:  Plastic Surgery, Hand Surgery  Why:  For wound re-check  Contact information:  [address]  [address]  [facility]  [facility] [address]  [phone]

## 2018-12-18 NOTE — ANESTHESIA POSTPROCEDURE EVALUATION
"Anesthesia Post Evaluation    Patient: Desiree Salas    Procedure(s) Performed: Procedure(s) (LRB):  EXCISION, GANGLION CYST, WRIST LEFT (Left)    Final Anesthesia Type: general  Patient location during evaluation: [facility]  Patient participation: Yes- Able to Participate  Level of consciousness: awake and alert and oriented  Post-procedure vital signs: reviewed and stable  Pain management: adequate  Airway patency: patent  PONV status at discharge: No PONV  Anesthetic complications: no      Cardiovascular status: hemodynamically stable  Respiratory status: unassisted, spontaneous ventilation and room air  Hydration status: euvolemic  Follow-up not needed.        Visit Vitals  /60 (BP Location: Right arm, Patient Position: Sitting)   Pulse 61   Temp 36.8 °C (98.2 °F) (Oral)   Resp 18   Ht 5' 4" (1.626 m)   Wt 87 kg (191 lb 12.8 oz)   LMP 12/13/2018   SpO2 98%   Breastfeeding? No   BMI 32.92 kg/m²       Pain/Eric Score: No Data Recorded      "

## 2018-12-18 NOTE — DISCHARGE INSTRUCTIONS
Anesthesia: Monitored Anesthesia Care (MAC)    Anesthesia Safety  · Have an adult family member or friend drive you home after the procedure.  · For the first 24 hours after your surgery:  ¨ Do not drive or use heavy equipment.  ¨ Do not make important decisions or sign documents.  ¨ Avoid alcohol.  ¨ Have someone stay with you, if possible. They can watch for problems and help keep you safe.        FOLLOW ANY OTHER INSTRUCTIONS GIVEN PER DR FIELDS!!!!!

## 2018-12-18 NOTE — INTERVAL H&P NOTE
The patient has been examined and the H&P has been reviewed:    I concur with the findings and no changes have occurred since H&P was written.    Anesthesia/Surgery risks, benefits and alternative options discussed and understood by patient/family.          Active Hospital Problems    Diagnosis  POA    Ganglion cyst of wrist, left [M67.432]  Yes      Resolved Hospital Problems   No resolved problems to display.

## 2018-12-19 ENCOUNTER — PROCEDURE VISIT (OUTPATIENT)
Dept: OBSTETRICS AND GYNECOLOGY | Facility: CLINIC | Age: 43
End: 2018-12-19
Attending: OBSTETRICS & GYNECOLOGY
Payer: COMMERCIAL

## 2018-12-19 ENCOUNTER — LAB VISIT (OUTPATIENT)
Dept: LAB | Facility: OTHER | Age: 43
End: 2018-12-19
Attending: OBSTETRICS & GYNECOLOGY
Payer: COMMERCIAL

## 2018-12-19 ENCOUNTER — TELEPHONE (OUTPATIENT)
Dept: INTERNAL MEDICINE | Facility: CLINIC | Age: 43
End: 2018-12-19

## 2018-12-19 VITALS
BODY MASS INDEX: 32.61 KG/M2 | HEIGHT: 64 IN | DIASTOLIC BLOOD PRESSURE: 70 MMHG | WEIGHT: 191 LBS | SYSTOLIC BLOOD PRESSURE: 120 MMHG

## 2018-12-19 DIAGNOSIS — N83.202 LEFT OVARIAN CYST: Primary | ICD-10-CM

## 2018-12-19 DIAGNOSIS — N85.2 UTERINE ENLARGEMENT: ICD-10-CM

## 2018-12-19 DIAGNOSIS — N83.202 LEFT OVARIAN CYST: ICD-10-CM

## 2018-12-19 DIAGNOSIS — D21.9 LEIOMYOMA: ICD-10-CM

## 2018-12-19 DIAGNOSIS — J32.9 CHRONIC SINUSITIS, UNSPECIFIED LOCATION: Primary | ICD-10-CM

## 2018-12-19 DIAGNOSIS — N83.209 CYST OF OVARY, UNSPECIFIED LATERALITY: ICD-10-CM

## 2018-12-19 LAB — CANCER AG125 SERPL-ACNC: 24 U/ML

## 2018-12-19 PROCEDURE — 99213 OFFICE O/P EST LOW 20 MIN: CPT | Mod: S$GLB,,, | Performed by: OBSTETRICS & GYNECOLOGY

## 2018-12-19 PROCEDURE — 3008F PR BODY MASS INDEX (BMI) DOCUMENTED: ICD-10-PCS | Mod: CPTII,S$GLB,, | Performed by: OBSTETRICS & GYNECOLOGY

## 2018-12-19 PROCEDURE — 36415 COLL VENOUS BLD VENIPUNCTURE: CPT

## 2018-12-19 PROCEDURE — 99213 PR OFFICE/OUTPT VISIT, EST, LEVL III, 20-29 MIN: ICD-10-PCS | Mod: S$GLB,,, | Performed by: OBSTETRICS & GYNECOLOGY

## 2018-12-19 PROCEDURE — 99999 PR PBB SHADOW E&M-EST. PATIENT-LVL III: ICD-10-PCS | Mod: PBBFAC,,, | Performed by: OBSTETRICS & GYNECOLOGY

## 2018-12-19 PROCEDURE — 86304 IMMUNOASSAY TUMOR CA 125: CPT

## 2018-12-19 PROCEDURE — 99999 PR PBB SHADOW E&M-EST. PATIENT-LVL III: CPT | Mod: PBBFAC,,, | Performed by: OBSTETRICS & GYNECOLOGY

## 2018-12-19 PROCEDURE — 3008F BODY MASS INDEX DOCD: CPT | Mod: CPTII,S$GLB,, | Performed by: OBSTETRICS & GYNECOLOGY

## 2018-12-19 NOTE — TELEPHONE ENCOUNTER
fayem for pt to call office back in regards of :      Cami, this is Cegiovanny I've received your request I'm returning your call from  clinic at [facility]. I just wanted to let you know that Dr. Lemos heard from her hearing doctor that she would like a referral to see ENT for her sinuses. Please call and schedule an ENT appt.    Sent my chart

## 2018-12-19 NOTE — TELEPHONE ENCOUNTER
Please call patient and inform that I heard from her hearing doctor that she would like a referral to see ENT for her sinuses. I have put in this referral. Please help patient make an appointment if she would like.    thanks

## 2018-12-19 NOTE — OP NOTE
DATE OF PROCEDURE:  12/18/2018.    PREOPERATIVE DIAGNOSIS:  Left wrist dorsal ganglion cyst.    POSTOPERATIVE DIAGNOSIS:  Left wrist dorsal ganglion cyst.    PROCEDURE:  Excision of left wrist dorsal ganglion cyst.    SURGEON:  Walter Joyner Jr., M.D.    ANESTHESIA:  Local with MAC.    FLUIDS:  400 mL crystalloid.    ESTIMATED BLOOD LOSS:  Minimal.    SPECIMENS:  None.    FINDINGS:  Large left wrist dorsal ganglion cyst.    IMPLANTS AND GRAFTS:  None.    COMPLICATIONS:  None.    DISPOSITION:  To PACU, then home.    INDICATIONS:  Ms. Salas is a 43-year-old right-hand dominant female who   presented with complaints of a large dorsal wrist cyst of the left wrist.  On   exam, she was found to have a quite large ganglion cyst of the left dorsal   wrist.  She complained of discomfort as well as increasing size.  She was   seeking removal of the cyst.  We discussed conservative and operative   treatments.  She was to pursue the operative removal of the cyst.  Discussed   risks, benefits, alternatives in detail and she wished to proceed.  Informed   consent was obtained and the patient was then scheduled for procedure.    PROCEDURE IN DETAIL:  After proper identification of Mrs. Salas in the   preoperative area, the patient was wheeled to the Operating Room on a hospital   stretcher.  Pressure points were padded and checked this time.  A timeout was   called in when surgeon, nurses and Anesthesia agreed upon the patient and   procedure.  She received Ancef for prophylactic antibiotics.  A padded 18-inch   tourniquet was then placed to her left forearm.  She was then induced under MAC   sedation.  Once the patient was well sedated, 10 mL of 1% lidocaine with   epinephrine was injected into the subcutaneous tissue surrounding the ganglion   as well as into the radiocarpal joint.  Next, the hand was then prepped and   draped in usual sterile fashion.  An Esmarch tourniquet was used to exsanguinate   the hand and tourniquet  was inflated to 250 mmHg.  A longitudinal incision was   made over the ganglion cyst through the skin and dermis.  Subcutaneous tissue   was then bluntly dissected down to the dorsal aspect of the ganglion cyst.  The   cyst walls were then dissected from the surrounding tissue using Littler   scissors and then carefully tracked the ganglion to the dorsal aspect of the   radiocarpal joint and the extensor tendons were retracted and protected out of   the way.  The stalk was then identified exiting the radiocarpal joint.  It was   then amputated at its base using electrocautery.  The openings to the   radiocarpal joint were then cauterized and closed.  The ganglion cyst sac was   then passed off the field.  It was not submitted for pathology.  Next, the wound   was irrigated with copious amounts of normal saline.  The tourniquet was then   released at this time and hemostasis was achieved using electrocautery and   direct pressure.  The wound was once again irrigated and the patient was then   asked to make a complete fist and flex and extend the digits.  She was able to   move all digits at all joints.  I was then satisfied with removal of the   ganglion cyst.  The wound was then closed in layers using 3-0 Vicryl sutures in   a deep dermal interrupted fashion followed by closure of skin using a 4-0   Monocryl in a subcuticular fashion.  The wound was then cleaned and dried.    Xeroform was then applied followed by dry sterile dressing and a volar splint   with the wrist in neutral and fingers free.  This concluded the procedure.  The   patient tolerated the procedure well without any complications.  At the end the   case, needle and sponge counts were correct x2 and I was present and scrubbed   all aspects of procedure.      ARIADNE/NIEVES  dd: 12/18/2018 15:30:37 (CST)  td: 12/18/2018 21:03:33 ([facility])  Doc ID   #[id number]  Job ID #[id number]    CC:

## 2018-12-31 NOTE — PROGRESS NOTES
Counseling Note:    Subjective:       Patient ID: Desiree Salas is a 43 y.o. female.    Chief Complaint:  Follow-up (Ultrasound)      History of Present Illness  HPI 43 year old here for an ultrasound.  Uterine fibroid noted.  Ovarian cyst discussed.  Patient seeing dr stroud for fertility options.  Discussed needing close follow up of ovarian cyst and needing fibroid removal before IUI.  Patient without symptoms and wants to talk with Dr. Stroud about options.  Regular monthly periods.      GYN & OB History  Patient's last menstrual period was 12/13/2018.   Date of Last Pap: 11/1/2018    Past Medical History:   Diagnosis Date    ADHD (attention deficit hyperactivity disorder), inattentive type     Anxiety     Asthma     exercise induced    Depression        Past Surgical History:   Procedure Laterality Date    CYST REMOVAL  2016    groin area    DILATION AND CURETTAGE OF UTERUS  1998    due to heavy/irregular menses    EXCISION, GANGLION CYST, WRIST LEFT Left 12/18/2018    Performed by Walter Joyner Jr., MD at [facility] OR       Outpatient Encounter Medications as of 12/19/2018   Medication Sig Dispense Refill    ARIPiprazole (ABILIFY) 5 MG Tab Take 1 tablet by mouth once daily.  0    dextroamphetamine-amphetamine 10 mg Tab Take 1 tablet by mouth 2 (two) times daily.  0    FLUoxetine (PROZAC) 10 MG capsule TK ONE C PO  DAILY ON DAYS 14-28 OF CYCLE PLUS FIRST DAY OF MENSTRUATION  2    FLUoxetine (PROZAC) 40 MG capsule Take 80 mg by mouth once daily.      glucosamine-chondroitin 500-400 mg tablet Take 1 tablet by mouth once daily.       HYDROcodone-acetaminophen (NORCO) 5-325 mg per tablet Take 1 tablet by mouth every 6 (six) hours as needed for Pain. 20 tablet 0    levomefolate calcium (DEPLIN ORAL) Take 15 mg by mouth once daily.      PNV no.95/ferrous fum/folic ac (PRENATAL MULTIVITAMINS ORAL) Take 1 tablet by mouth once daily.      [DISCONTINUED] ibuprofen/pseudoephedrine HCl (ADVIL COLD  AND SINUS ORAL) Take by mouth.      [DISCONTINUED] multivitamin with minerals tablet Take 1 tablet by mouth once daily.       No facility-administered encounter medications on file as of 12/19/2018.          Review of Systems  Review of Systems   Gastrointestinal: Negative for abdominal pain.   Genitourinary: Negative for dysuria, menstrual problem, pelvic pain, vaginal bleeding and vaginal discharge.   Psychiatric/Behavioral: Negative for dysphoric mood.           Objective:      No acute distress.         Assessment:        1. Left ovarian cyst    2. Cyst of ovary, unspecified laterality       Marlecia was seen today for follow-up.    Diagnoses and all orders for this visit:    Left ovarian cyst  -     ; Future    Cyst of ovary, unspecified laterality            Plan:      Discussed ultrasound and fibroid/cyst findings patient will follow up with Dr. Borja and decide on removal.    Interested in IUI   15 minute discussion -at the least needs follow up of ovarian cyst      This is a counseling appointment with greater than 50% of the time spent counseling.

## 2019-01-15 ENCOUNTER — CLINICAL SUPPORT (OUTPATIENT)
Dept: OTOLARYNGOLOGY | Facility: CLINIC | Age: 44
End: 2019-01-15
Payer: COMMERCIAL

## 2019-01-15 DIAGNOSIS — Z71.89 ENCOUNTER FOR HEARING AID CONSULTATION: Primary | ICD-10-CM

## 2019-01-15 PROCEDURE — 99499 UNLISTED E&M SERVICE: CPT | Mod: S$GLB,,, | Performed by: AUDIOLOGIST-HEARING AID FITTER

## 2019-01-15 PROCEDURE — 99499 NO LOS: ICD-10-PCS | Mod: S$GLB,,, | Performed by: AUDIOLOGIST-HEARING AID FITTER

## 2019-01-29 ENCOUNTER — OFFICE VISIT (OUTPATIENT)
Dept: OTOLARYNGOLOGY | Facility: CLINIC | Age: 44
End: 2019-01-29
Payer: COMMERCIAL

## 2019-01-29 VITALS
HEART RATE: 78 BPM | DIASTOLIC BLOOD PRESSURE: 64 MMHG | SYSTOLIC BLOOD PRESSURE: 108 MMHG | WEIGHT: 194.69 LBS | HEIGHT: 64 IN | TEMPERATURE: 99 F | BODY MASS INDEX: 33.24 KG/M2

## 2019-01-29 DIAGNOSIS — J34.2 NASAL SEPTAL DEVIATION: ICD-10-CM

## 2019-01-29 DIAGNOSIS — J30.9 ALLERGIC RHINITIS, UNSPECIFIED SEASONALITY, UNSPECIFIED TRIGGER: Primary | ICD-10-CM

## 2019-01-29 DIAGNOSIS — H90.3 SENSORINEURAL HEARING LOSS (SNHL) OF BOTH EARS: ICD-10-CM

## 2019-01-29 DIAGNOSIS — H61.22 IMPACTED CERUMEN OF LEFT EAR: ICD-10-CM

## 2019-01-29 PROCEDURE — 99204 OFFICE O/P NEW MOD 45 MIN: CPT | Mod: 25,S$GLB,, | Performed by: SPECIALIST

## 2019-01-29 PROCEDURE — 69210 REMOVE IMPACTED EAR WAX UNI: CPT | Mod: S$GLB,,, | Performed by: SPECIALIST

## 2019-01-29 PROCEDURE — 69210 EAR CERUMEN REMOVAL: ICD-10-PCS | Mod: S$GLB,,, | Performed by: SPECIALIST

## 2019-01-29 PROCEDURE — 3008F PR BODY MASS INDEX (BMI) DOCUMENTED: ICD-10-PCS | Mod: CPTII,S$GLB,, | Performed by: SPECIALIST

## 2019-01-29 PROCEDURE — 99204 PR OFFICE/OUTPT VISIT, NEW, LEVL IV, 45-59 MIN: ICD-10-PCS | Mod: 25,S$GLB,, | Performed by: SPECIALIST

## 2019-01-29 PROCEDURE — 3008F BODY MASS INDEX DOCD: CPT | Mod: CPTII,S$GLB,, | Performed by: SPECIALIST

## 2019-01-29 RX ORDER — LISDEXAMFETAMINE DIMESYLATE 50 MG/1
CAPSULE ORAL EVERY MORNING
Refills: 0 | COMMUNITY
Start: 2019-01-15 | End: 2020-09-23

## 2019-01-29 NOTE — PROCEDURES
"Ear Cerumen Removal  Date/Time: 1/29/2019 4:15 PM  Performed by: CINDA Costello MD  Authorized by: CINDA Costello MD     Time out: Immediately prior to procedure a "time out" was called to verify the correct patient, procedure, equipment, support staff and site/side marked as required.    Consent Done?:  Yes (Verbal)    Local anesthetic:  None  Location details:  Left ear  Procedure type comment:  Wire loop, suction and irrigation  Cerumen  Removal Results:  Cerumen completely removed      "

## 2019-01-29 NOTE — PROGRESS NOTES
Subjective:       Patient ID: Desiree Salas is a 43 y.o. female.    Chief Complaint: Sinus Problem and Hearing Loss    The patient is referred here for evaluation of chronic sinonasal symptoms.  She she was born and raised in [place] and moved to [place] 2 years ago.  Since coming here she has had nasal congestion or rhinorrhea, postnasal drip and sneezing chronically.  She does have recurring popping in her ears and headaches that occur very frequently in the mid forehead area.  These headaches do increase in severity when she bends forward.  She has been using Advil cold and sinus with a without Zyrtec D. She did use Flonase for 1 week but did not find it particularly helpful.    She has also noticed that she is having difficulty in understanding people when they speak with her.  She does turn television loud.  When she has in face-to-face situation she ask people to repeat themselves very frequently.  She does not have tinnitus.  Family history is negative for hearing loss..      Review of Systems   Constitutional: Negative for activity change, appetite change, chills, fatigue, fever and unexpected weight change.   HENT: Positive for congestion, ear pain, hearing loss, postnasal drip, rhinorrhea, sinus pressure and sinus pain. Negative for ear discharge, facial swelling, mouth sores, sneezing, sore throat, tinnitus, trouble swallowing and voice change.    Eyes: Positive for pain. Negative for photophobia, discharge, redness, itching and visual disturbance.   Respiratory: Positive for cough. Negative for apnea, choking, shortness of breath and wheezing.    Cardiovascular: Negative for chest pain and palpitations.   Gastrointestinal: Negative for abdominal distention, abdominal pain, nausea and vomiting.   Musculoskeletal: Negative for arthralgias, myalgias, neck pain and neck stiffness.   Skin: Negative.  Negative for color change, pallor and rash.   Allergic/Immunologic: Positive for environmental  allergies. Negative for food allergies and immunocompromised state.   Neurological: Positive for headaches. Negative for dizziness, facial asymmetry, speech difficulty, weakness, light-headedness and numbness.   Hematological: Negative for adenopathy. Does not bruise/bleed easily.   Psychiatric/Behavioral: Negative for confusion, decreased concentration and sleep disturbance.       Objective:      Physical Exam   Constitutional: She is oriented to person, place, and time. She appears well-developed and well-nourished. She is cooperative.   HENT:   Head: Normocephalic.   Right Ear: External ear and ear canal normal. Tympanic membrane is retracted.   Left Ear: External ear and ear canal normal. No drainage ( left ear canal blocked with hard cerumen impaction). Tympanic membrane is retracted.   Nose: Mucosal edema (cyanotic, boggy inferior turbinates bilaterally), rhinorrhea (clear mucus bilaterally) and septal deviation (To the right) present.   Mouth/Throat: Uvula is midline, oropharynx is clear and moist and mucous membranes are normal. No oral lesions.   Eyes: EOM and lids are normal. Pupils are equal, round, and reactive to light. Right eye exhibits no discharge and no exudate. Left eye exhibits no discharge and no exudate. Right conjunctiva is injected. Left conjunctiva is injected.   Neck: Trachea normal and normal range of motion. No muscular tenderness present. No tracheal deviation present. No thyroid mass and no thyromegaly present.   Cardiovascular: Normal rate, regular rhythm, normal heart sounds and normal pulses.   Pulmonary/Chest: Effort normal and breath sounds normal. No stridor. She has no decreased breath sounds. She has no wheezes. She has no rhonchi. She has no rales.   Abdominal: Soft. Bowel sounds are normal. There is no tenderness.   Musculoskeletal: Normal range of motion.   Lymphadenopathy:        Head (right side): No submental, no submandibular, no preauricular, no posterior auricular and  no occipital adenopathy present.        Head (left side): No submental, no submandibular, no preauricular, no posterior auricular and no occipital adenopathy present.     She has no cervical adenopathy.   Neurological: She is alert and oriented to person, place, and time. She has normal strength. No cranial nerve deficit or sensory deficit. Gait normal.   Skin: Skin is warm and dry. No petechiae and no rash noted. No cyanosis. Nails show no clubbing.   Psychiatric: She has a normal mood and affect. Her speech is normal and behavior is normal. Judgment and thought content normal. Cognition and memory are normal.         Procedure:  Cerumen was removed from the left ear using wire loop, suction and irrigation.  The patient tolerated procedure well        Assessment:       1. Allergic rhinitis, unspecified seasonality, unspecified trigger    2. Nasal septal deviation    3. Impacted cerumen of left ear    4. Sensorineural hearing loss (SNHL) of both ears        Plan:       I had a long discussion with the patient regarding her hearing loss.  I feel she would benefit greatly from bilateral amplification and I am referring her to the audiologist for hearing aid evaluation.  She is cleared medically for bilateral hearing aids.  I have advised that she refrain from loud noise exposure and that she wear hearing protection if she is around loud noise.  I am having her use either Allegra D or Claritin D every morning, Flonase once daily and supplement with the Advil cold and sinus if need be.  In 2 weeks if she does not feel that her symptoms are well controlled I will add a nighttime dose of Singulair to her medicines.  I a.m. rechecking her in 4 weeks.

## 2019-01-29 NOTE — LETTER
January 30, 2019      Tiffanie Lemos MD  [address]  [address]  [facility] [address]           [facility] - Otorhinolaryngology  [address]  [facility] [address]  Phone: [phone]  Fax: [phone]          Patient: Desiree Salas   MR Number: [id number]   YOB: 1975   Date of Visit: 1/29/2019       Dear Dr. Tiffanie Lemos:    Thank you for referring Desiree Salas to me for evaluation. Attached you will find relevant portions of my assessment and plan of care.    If you have questions, please do not hesitate to call me. I look forward to following Desiree Salas along with you.    Sincerely,    CINDA Costello MD    Enclosure  CC:  No Recipients    If you would like to receive this communication electronically, please contact [email or web address] or [phone] to request more information on [employer] Link access.    For providers and/or their staff who would like to refer a patient to [facility], please contact us through our one-stop-shop provider referral line, [facility], at [phone].    If you feel you have received this communication in error or would no longer like to receive these types of communications, please e-mail [email or web address]

## 2019-02-04 ENCOUNTER — PATIENT MESSAGE (OUTPATIENT)
Dept: OTOLARYNGOLOGY | Facility: CLINIC | Age: 44
End: 2019-02-04

## 2019-02-06 ENCOUNTER — OFFICE VISIT (OUTPATIENT)
Dept: OBSTETRICS AND GYNECOLOGY | Facility: CLINIC | Age: 44
End: 2019-02-06
Attending: OBSTETRICS & GYNECOLOGY
Payer: COMMERCIAL

## 2019-02-06 ENCOUNTER — TELEPHONE (OUTPATIENT)
Dept: INTERNAL MEDICINE | Facility: CLINIC | Age: 44
End: 2019-02-06

## 2019-02-06 VITALS
WEIGHT: 194.13 LBS | BODY MASS INDEX: 33.14 KG/M2 | HEIGHT: 64 IN | SYSTOLIC BLOOD PRESSURE: 120 MMHG | DIASTOLIC BLOOD PRESSURE: 70 MMHG

## 2019-02-06 DIAGNOSIS — Z12.31 ENCOUNTER FOR SCREENING MAMMOGRAM FOR BREAST CANCER: Primary | ICD-10-CM

## 2019-02-06 DIAGNOSIS — D21.9 LEIOMYOMA: Primary | ICD-10-CM

## 2019-02-06 PROCEDURE — 99999 PR PBB SHADOW E&M-EST. PATIENT-LVL III: ICD-10-PCS | Mod: PBBFAC,,, | Performed by: OBSTETRICS & GYNECOLOGY

## 2019-02-06 PROCEDURE — 99213 PR OFFICE/OUTPT VISIT, EST, LEVL III, 20-29 MIN: ICD-10-PCS | Mod: S$GLB,,, | Performed by: OBSTETRICS & GYNECOLOGY

## 2019-02-06 PROCEDURE — 3008F PR BODY MASS INDEX (BMI) DOCUMENTED: ICD-10-PCS | Mod: CPTII,S$GLB,, | Performed by: OBSTETRICS & GYNECOLOGY

## 2019-02-06 PROCEDURE — 99213 OFFICE O/P EST LOW 20 MIN: CPT | Mod: S$GLB,,, | Performed by: OBSTETRICS & GYNECOLOGY

## 2019-02-06 PROCEDURE — 99999 PR PBB SHADOW E&M-EST. PATIENT-LVL III: CPT | Mod: PBBFAC,,, | Performed by: OBSTETRICS & GYNECOLOGY

## 2019-02-06 PROCEDURE — 3008F BODY MASS INDEX DOCD: CPT | Mod: CPTII,S$GLB,, | Performed by: OBSTETRICS & GYNECOLOGY

## 2019-02-06 RX ORDER — ARIPIPRAZOLE 10 MG/1
TABLET ORAL DAILY
COMMUNITY
Start: 2019-01-30 | End: 2019-08-02

## 2019-02-06 NOTE — TELEPHONE ENCOUNTER
Called pt and informed her that she haven't had a mammo done since 2017 and its time for another.. signed mammo orders and will send order to [facility] for pt to do order there   Mailing out a copy to her as well

## 2019-02-06 NOTE — TELEPHONE ENCOUNTER
Please call patient and inform that she is due for a repeat mammogram. I saw her records from [facility] and she has not had a mammogram since 2017.  Would she like for me to order this?    thanks

## 2019-02-08 NOTE — PROGRESS NOTES
Counseling Note:    Subjective:       Patient ID: Desiree Salas is a 43 y.o. female.    Chief Complaint:  Follow-up      History of Present Illness  HPI 43 year old here for management of uterine fibroids.  Ultrasound performed showing 3 fibroids with the largest fundal in location that is impacting the endometrial stripe.  Fibroid 5x5   Patient met with Dr. Jesus and removal was discussed before pregnancy attempted.  Discussed removal being a laparotomy with a one night stay with removal of at least the largest fibroid.      GYN & OB History  Patient's last menstrual period was 02/04/2019.   Date of Last Pap: 11/1/2018    Past Medical History:   Diagnosis Date    ADHD (attention deficit hyperactivity disorder), inattentive type     Anxiety     Asthma     exercise induced    Depression        Past Surgical History:   Procedure Laterality Date    CYST REMOVAL  2016    groin area    DILATION AND CURETTAGE OF UTERUS  1998    due to heavy/irregular menses    EXCISION, GANGLION CYST, WRIST LEFT Left 12/18/2018    Performed by Walter Joyner Jr., MD at [facility] OR       Outpatient Encounter Medications as of 2/6/2019   Medication Sig Dispense Refill    ARIPiprazole (ABILIFY) 10 MG Tab       FLUoxetine (PROZAC) 10 MG capsule TK ONE C PO  DAILY ON DAYS 14-28 OF CYCLE PLUS FIRST DAY OF MENSTRUATION  2    FLUoxetine (PROZAC) 40 MG capsule Take 80 mg by mouth once daily.      glucosamine-chondroitin 500-400 mg tablet Take 1 tablet by mouth once daily.       levomefolate calcium (DEPLIN ORAL) Take 15 mg by mouth once daily.      PNV no.95/ferrous fum/folic ac (PRENATAL MULTIVITAMINS ORAL) Take 1 tablet by mouth once daily.      VYVANSE 50 mg capsule   0    [DISCONTINUED] ARIPiprazole (ABILIFY) 5 MG Tab Take 1 tablet by mouth once daily.  0    [DISCONTINUED] dextroamphetamine-amphetamine 10 mg Tab Take 1 tablet by mouth 2 (two) times daily.  0    [DISCONTINUED] HYDROcodone-acetaminophen (NORCO) 5-325  mg per tablet Take 1 tablet by mouth every 6 (six) hours as needed for Pain. 20 tablet 0     No facility-administered encounter medications on file as of 2/6/2019.          Review of Systems  Review of Systems   Gastrointestinal: Negative for abdominal distention and abdominal pain.   Endocrine: Negative for heat intolerance.   Genitourinary: Negative for dysuria, menstrual problem, pelvic pain and vaginal bleeding.   Psychiatric/Behavioral: Negative for dysphoric mood.           Objective:      No acute distress.         Assessment:        1. Leiomyoma       Marlecia was seen today for follow-up.    Diagnoses and all orders for this visit:    Leiomyoma            Plan:      15 minutes about removal of her uterine fibroid before pregnancy attempted  Location and removal method discussed   Case to be scheduled      This is a counseling appointment with greater than 50% of the time spent counseling.

## 2019-02-09 ENCOUNTER — PATIENT MESSAGE (OUTPATIENT)
Dept: OBSTETRICS AND GYNECOLOGY | Facility: CLINIC | Age: 44
End: 2019-02-09

## 2019-02-12 ENCOUNTER — TELEPHONE (OUTPATIENT)
Dept: OBSTETRICS AND GYNECOLOGY | Facility: CLINIC | Age: 44
End: 2019-02-12

## 2019-02-12 ENCOUNTER — PATIENT MESSAGE (OUTPATIENT)
Dept: OBSTETRICS AND GYNECOLOGY | Facility: CLINIC | Age: 44
End: 2019-02-12

## 2019-02-12 DIAGNOSIS — D21.9 FIBROIDS: Primary | ICD-10-CM

## 2019-02-15 ENCOUNTER — TELEPHONE (OUTPATIENT)
Dept: INTERNAL MEDICINE | Facility: CLINIC | Age: 44
End: 2019-02-15

## 2019-02-15 DIAGNOSIS — R92.8 ABNORMAL MAMMOGRAM OF LEFT BREAST: Primary | ICD-10-CM

## 2019-02-15 NOTE — TELEPHONE ENCOUNTER
Please call [facility] and ask if they plan to fax over orders for follow-up breast imaging since this patient had an abnormal mammogram.  Or should I fax over orders?    thanks

## 2019-02-17 ENCOUNTER — PATIENT MESSAGE (OUTPATIENT)
Dept: INTERNAL MEDICINE | Facility: CLINIC | Age: 44
End: 2019-02-17

## 2019-02-17 ENCOUNTER — PATIENT MESSAGE (OUTPATIENT)
Dept: OBSTETRICS AND GYNECOLOGY | Facility: CLINIC | Age: 44
End: 2019-02-17

## 2019-02-17 DIAGNOSIS — F90.0 ATTENTION DEFICIT HYPERACTIVITY DISORDER (ADHD), PREDOMINANTLY INATTENTIVE TYPE: ICD-10-CM

## 2019-02-17 DIAGNOSIS — F32.A DEPRESSION, UNSPECIFIED DEPRESSION TYPE: ICD-10-CM

## 2019-02-17 DIAGNOSIS — F41.9 ANXIETY DISORDER, UNSPECIFIED TYPE: Primary | ICD-10-CM

## 2019-02-18 ENCOUNTER — TELEPHONE (OUTPATIENT)
Dept: INTERNAL MEDICINE | Facility: CLINIC | Age: 44
End: 2019-02-18

## 2019-02-18 NOTE — TELEPHONE ENCOUNTER
Cami Yepez,    Please call patient and inform that we need to do further imaging of her left breast. There were some new small abnormalities that need to be evaluated with another test.   Please fax these orders to [facility] so patient can have imaging done    Thanks!

## 2019-02-18 NOTE — TELEPHONE ENCOUNTER
, what diagnosis can I put in for the consult to psych? I will also give [facility] the [facility].

## 2019-02-19 NOTE — TELEPHONE ENCOUNTER
Called pt and informed her that there is new small abnormalities delbert her left breat and that further testing will have to be done her orders will be faxed over by the end of today to [facility].  pt showed verbal understanding

## 2019-02-25 ENCOUNTER — TELEPHONE (OUTPATIENT)
Dept: OBSTETRICS AND GYNECOLOGY | Facility: CLINIC | Age: 44
End: 2019-02-25

## 2019-02-25 NOTE — TELEPHONE ENCOUNTER
Spoke with pt and informed her that the code is correct 70283. I double checked the CPT book. I told pt if member services still had issues to have them call me.

## 2019-03-12 ENCOUNTER — TELEPHONE (OUTPATIENT)
Dept: INTERNAL MEDICINE | Facility: CLINIC | Age: 44
End: 2019-03-12

## 2019-03-12 DIAGNOSIS — R92.1 BREAST CALCIFICATION SEEN ON MAMMOGRAM: Primary | ICD-10-CM

## 2019-03-12 DIAGNOSIS — N63.20 MASS OF BREAST, LEFT: ICD-10-CM

## 2019-03-12 NOTE — TELEPHONE ENCOUNTER
from [facility] called stated that pt needed and stereotactic biopsy do to her mammogram having suspicious calcification in left breast and the increase in numbers. orded is pended to be sign so it can be fax pt appt is already schedule just need an order

## 2019-03-13 ENCOUNTER — ANESTHESIA EVENT (OUTPATIENT)
Dept: SURGERY | Facility: OTHER | Age: 44
DRG: 743 | End: 2019-03-13
Payer: COMMERCIAL

## 2019-03-13 ENCOUNTER — TELEPHONE (OUTPATIENT)
Dept: OBSTETRICS AND GYNECOLOGY | Facility: CLINIC | Age: 44
End: 2019-03-13

## 2019-03-13 ENCOUNTER — OFFICE VISIT (OUTPATIENT)
Dept: OBSTETRICS AND GYNECOLOGY | Facility: CLINIC | Age: 44
DRG: 743 | End: 2019-03-13
Attending: OBSTETRICS & GYNECOLOGY
Payer: COMMERCIAL

## 2019-03-13 ENCOUNTER — HOSPITAL ENCOUNTER (OUTPATIENT)
Dept: PREADMISSION TESTING | Facility: OTHER | Age: 44
Discharge: HOME | DRG: 743 | End: 2019-03-13
Attending: OBSTETRICS & GYNECOLOGY
Payer: COMMERCIAL

## 2019-03-13 VITALS
HEIGHT: 64 IN | WEIGHT: 196 LBS | TEMPERATURE: 99 F | BODY MASS INDEX: 33.46 KG/M2 | HEART RATE: 65 BPM | DIASTOLIC BLOOD PRESSURE: 64 MMHG | OXYGEN SATURATION: 96 % | SYSTOLIC BLOOD PRESSURE: 117 MMHG

## 2019-03-13 VITALS
WEIGHT: 196 LBS | DIASTOLIC BLOOD PRESSURE: 58 MMHG | SYSTOLIC BLOOD PRESSURE: 100 MMHG | HEIGHT: 64 IN | BODY MASS INDEX: 33.46 KG/M2

## 2019-03-13 DIAGNOSIS — F32.A DEPRESSION, UNSPECIFIED DEPRESSION TYPE: ICD-10-CM

## 2019-03-13 DIAGNOSIS — F41.9 ANXIETY: ICD-10-CM

## 2019-03-13 DIAGNOSIS — F90.0 ATTENTION DEFICIT HYPERACTIVITY DISORDER (ADHD), PREDOMINANTLY INATTENTIVE TYPE: Primary | ICD-10-CM

## 2019-03-13 DIAGNOSIS — Z01.818 PRE-OP EVALUATION: ICD-10-CM

## 2019-03-13 DIAGNOSIS — D21.9 LEIOMYOMA: ICD-10-CM

## 2019-03-13 LAB
ABO + RH BLD: NORMAL
BASOPHILS # BLD AUTO: 0.01 K/UL
BASOPHILS NFR BLD: 0.1 %
BLD GP AB SCN CELLS X3 SERPL QL: NORMAL
DIFFERENTIAL METHOD: ABNORMAL
EOSINOPHIL # BLD AUTO: 0.2 K/UL
EOSINOPHIL NFR BLD: 2.1 %
ERYTHROCYTE [DISTWIDTH] IN BLOOD BY AUTOMATED COUNT: 14.7 %
HCT VFR BLD AUTO: 37.8 %
HGB BLD-MCNC: 12.1 G/DL
LYMPHOCYTES # BLD AUTO: 2.4 K/UL
LYMPHOCYTES NFR BLD: 33.4 %
MCH RBC QN AUTO: 26.8 PG
MCHC RBC AUTO-ENTMCNC: 32 G/DL
MCV RBC AUTO: 84 FL
MONOCYTES # BLD AUTO: 0.4 K/UL
MONOCYTES NFR BLD: 5.1 %
NEUTROPHILS # BLD AUTO: 4.2 K/UL
NEUTROPHILS NFR BLD: 58.9 %
PLATELET # BLD AUTO: 238 K/UL
PMV BLD AUTO: 10.6 FL
RBC # BLD AUTO: 4.52 M/UL
WBC # BLD AUTO: 7.12 K/UL

## 2019-03-13 PROCEDURE — 3008F PR BODY MASS INDEX (BMI) DOCUMENTED: ICD-10-PCS | Mod: CPTII,S$GLB,, | Performed by: OBSTETRICS & GYNECOLOGY

## 2019-03-13 PROCEDURE — 99213 PR OFFICE/OUTPT VISIT, EST, LEVL III, 20-29 MIN: ICD-10-PCS | Mod: S$GLB,,, | Performed by: OBSTETRICS & GYNECOLOGY

## 2019-03-13 PROCEDURE — 99999 PR PBB SHADOW E&M-EST. PATIENT-LVL III: ICD-10-PCS | Mod: PBBFAC,,, | Performed by: OBSTETRICS & GYNECOLOGY

## 2019-03-13 PROCEDURE — 99213 OFFICE O/P EST LOW 20 MIN: CPT | Mod: S$GLB,,, | Performed by: OBSTETRICS & GYNECOLOGY

## 2019-03-13 PROCEDURE — 86901 BLOOD TYPING SEROLOGIC RH(D): CPT

## 2019-03-13 PROCEDURE — 85025 COMPLETE CBC W/AUTO DIFF WBC: CPT

## 2019-03-13 PROCEDURE — 99999 PR PBB SHADOW E&M-EST. PATIENT-LVL III: CPT | Mod: PBBFAC,,, | Performed by: OBSTETRICS & GYNECOLOGY

## 2019-03-13 PROCEDURE — 36415 COLL VENOUS BLD VENIPUNCTURE: CPT

## 2019-03-13 PROCEDURE — 3008F BODY MASS INDEX DOCD: CPT | Mod: CPTII,S$GLB,, | Performed by: OBSTETRICS & GYNECOLOGY

## 2019-03-13 RX ORDER — MULTIVITAMIN
1 TABLET ORAL
COMMUNITY
End: 2019-03-13 | Stop reason: CLARIF

## 2019-03-13 RX ORDER — PREGABALIN 75 MG/1
75 CAPSULE ORAL ONCE
Status: CANCELLED | OUTPATIENT
Start: 2019-03-13 | End: 2019-03-13

## 2019-03-13 RX ORDER — ACETAMINOPHEN 500 MG
1000 TABLET ORAL
Status: CANCELLED | OUTPATIENT
Start: 2019-03-13 | End: 2019-03-13

## 2019-03-13 RX ORDER — LIDOCAINE HYDROCHLORIDE 10 MG/ML
0.5 INJECTION, SOLUTION EPIDURAL; INFILTRATION; INTRACAUDAL; PERINEURAL ONCE
Status: CANCELLED | OUTPATIENT
Start: 2019-03-13 | End: 2019-03-13

## 2019-03-13 RX ORDER — SODIUM CHLORIDE, SODIUM LACTATE, POTASSIUM CHLORIDE, CALCIUM CHLORIDE 600; 310; 30; 20 MG/100ML; MG/100ML; MG/100ML; MG/100ML
INJECTION, SOLUTION INTRAVENOUS CONTINUOUS
Status: CANCELLED | OUTPATIENT
Start: 2019-03-13

## 2019-03-13 RX ORDER — BREXPIPRAZOLE 1 MG/1
TABLET ORAL
Refills: 0 | COMMUNITY
Start: 2019-02-27 | End: 2019-05-10

## 2019-03-13 NOTE — H&P (VIEW-ONLY)
[facility] [address]  History & Physical  Gynecology    SUBJECTIVE:     Chief Complaint/Reason for Admission: uterine fibroids    History of Present Illness:  Patient is a 43 y.o.  who presents with uterine fibroids impinging on the uterine lining.    Patient aware of risk and benefits of procedure.  Intermittent pressure sensation.         (Not in a hospital admission)    Review of patient's allergies indicates:  No Known Allergies    Past Medical History:   Diagnosis Date    ADHD (attention deficit hyperactivity disorder), inattentive type     Anxiety     Asthma     exercise induced; no inhaler use    Depression      Past Surgical History:   Procedure Laterality Date    CYST REMOVAL      groin area    DILATION AND CURETTAGE OF UTERUS      due to heavy/irregular menses    EXCISION, GANGLION CYST, WRIST LEFT Left 2018    Performed by Walter Joyner Jr., MD at [facility] OR     Family History   Problem Relation Age of Onset    Alzheimer's disease Maternal Grandmother     Diabetes Father     Hypertension Father     Diabetes Mother     Cancer Brother         unsure of the type    Migraines Sister      Social History     Tobacco Use    Smoking status: Current Some Day Smoker     Years: 2.00     Types: Cigarettes     Last attempt to quit: 2017     Years since quittin.6    Smokeless tobacco: Never Used    Tobacco comment: 1 pack in a week   Substance Use Topics    Alcohol use: Yes     Frequency: 4 or more times a week     Drinks per session: 1 or 2     Comment: daily, 2oz daily     Drug use: Yes     Types: Marijuana     Comment: daily       Review of Systems:  Constitutional: no fever or chills  Respiratory: no cough or shortness of breath  Cardiovascular: no chest pain or palpitations  Genitourinary: no hematuria or dysuria     OBJECTIVE:     Vital Signs (Most Recent):  BP: (!) 100/58 (19 0935)    Physical Exam:  General: well developed, well  nourished    . Chest Wall: no tenderness. Extremities: no cyanosis or edema, or clubbing. Pulses: 2+ and symmetric.  Pelvic:   Normal exam     Laboratory:  Lab Results   Component Value Date    WBC 7.12 03/13/2019    HGB 12.1 03/13/2019    HCT 37.8 03/13/2019    MCV 84 03/13/2019     03/13/2019       Ultrasound:  Results for orders placed in visit on 12/19/18   US Pelvis Comp with Transvag NON-OB (xpd    Narrative EXAMINATION:  US PELVIS COMP WITH TRANSVAG NON-OB (XPD)    CLINICAL HISTORY:  Hypertrophy of uterus    TECHNIQUE:  Transabdominal sonography of the pelvis was performed, followed by transvaginal sonography to better evaluate the uterus and ovaries.    COMPARISON:  None.    FINDINGS:  The uterus is enlarged in size and measures 10.7 x 5.6 x 5.6 cm.  The endometrial stripe is 5 mm in its visualized segment, partially displaced by uterine fibroid..  The uterus contains 3 masses most compatible with fibroids.  The largest is in the fundus of the uterus along the right and measures 4.7 x 5 x 5 cm, displacing the endometrial stripe and likely submucosal.  There are 2 smaller fibroid, measuring 2.4 x 2.1 x 2.2 cm intramural within the body of the uterus and 2.3 x 2.1 x 2.1 cm intramural along the posterior body of the uterus..    The right ovary measures 2.6 x 3.1 x 2.1 cm.  The left ovary measures 4.7 x 4.4 x 2.5cm.  It contains a 3.2 x 3.1 x 2.1 cm complex cystic mass with an echogenic nodule without flow.  There is appropriate flow in the ovaries.      Impression Uterine enlargement on the basis of uterine fibroids.    Complex cystic mass within the left ovary.  Recommend ultrasound follow-up in 8-12 weeks.    This report was flagged in Epic as abnormal.      Electronically signed by: Maranda Soler MD  Date:    12/19/2018  Time:    16:02           ASSESSMENT/PLAN:     There are no hospital problems to display for this patient.      To OR for open myomectomy   Risks and benefits explained in detail to  patient, including but not limited to damage to internal organs, including but not limited to bowel, bladder, uterus, tubes, ovaries, nerves, arteries, veins, possible need for blood transfusion. Patient is aware of all risks and desires surgery. All questions answered. Consents signed.

## 2019-03-13 NOTE — ANESTHESIA PREPROCEDURE EVALUATION
03/13/2019  Desiree Salas is a 43 y.o., female.    Anesthesia Evaluation    I have reviewed the Patient Summary Reports.    I have reviewed the Nursing Notes.   I have reviewed the Medications.     Review of Systems  Anesthesia Hx:  No problems with previous Anesthesia  Denies Family Hx of Anesthesia complications.   Denies Personal Hx of Anesthesia complications.   Social:  Non-Smoker    Hematology/Oncology:  Hematology Normal   Oncology Normal     Cardiovascular:  Cardiovascular Normal     Pulmonary:   As child had exercise induced asthma   Renal/:  Renal/ Normal     Hepatic/GI:  Hepatic/GI Normal    Musculoskeletal:  Musculoskeletal Normal    Neurological:  Neurology Normal    Endocrine:  Endocrine Normal    Psych:   Psychiatric History          Physical Exam  General:  Well nourished    Airway/Jaw/Neck:  Airway Findings: Mouth Opening: Normal Tongue: Normal  General Airway Assessment: Adult  Mallampati: I  TM Distance: Normal, at least 6 cm         Dental:  Dental Findings: In tact             Anesthesia Plan  Type of Anesthesia, risks & benefits discussed:  Anesthesia Type:  general  Patient's Preference:   Intra-op Monitoring Plan: standard ASA monitors  Intra-op Monitoring Plan Comments:   Post Op Pain Control Plan: per primary service following discharge from PACU  Post Op Pain Control Plan Comments:   Induction:   IV  Beta Blocker:         Informed Consent: Patient understands risks and agrees with Anesthesia plan.  Questions answered. Anesthesia consent signed with patient.  ASA Score: 2     Day of Surgery Review of History & Physical:    H&P update referred to the surgeon.     Anesthesia Plan Notes: Type and screen today        Ready For Surgery From Anesthesia Perspective.

## 2019-03-13 NOTE — H&P
[facility] [address]  History & Physical  Gynecology    SUBJECTIVE:     Chief Complaint/Reason for Admission: uterine fibroids    History of Present Illness:  Patient is a 43 y.o.  who presents with uterine fibroids impinging on the uterine lining.    Patient aware of risk and benefits of procedure.  Intermittent pressure sensation.         (Not in a hospital admission)    Review of patient's allergies indicates:  No Known Allergies    Past Medical History:   Diagnosis Date    ADHD (attention deficit hyperactivity disorder), inattentive type     Anxiety     Asthma     exercise induced; no inhaler use    Depression      Past Surgical History:   Procedure Laterality Date    CYST REMOVAL      groin area    DILATION AND CURETTAGE OF UTERUS      due to heavy/irregular menses    EXCISION, GANGLION CYST, WRIST LEFT Left 2018    Performed by Walter Joyner Jr., MD at [facility] OR     Family History   Problem Relation Age of Onset    Alzheimer's disease Maternal Grandmother     Diabetes Father     Hypertension Father     Diabetes Mother     Cancer Brother         unsure of the type    Migraines Sister      Social History     Tobacco Use    Smoking status: Current Some Day Smoker     Years: 2.00     Types: Cigarettes     Last attempt to quit: 2017     Years since quittin.6    Smokeless tobacco: Never Used    Tobacco comment: 1 pack in a week   Substance Use Topics    Alcohol use: Yes     Frequency: 4 or more times a week     Drinks per session: 1 or 2     Comment: daily, 2oz daily     Drug use: Yes     Types: Marijuana     Comment: daily       Review of Systems:  Constitutional: no fever or chills  Respiratory: no cough or shortness of breath  Cardiovascular: no chest pain or palpitations  Genitourinary: no hematuria or dysuria     OBJECTIVE:     Vital Signs (Most Recent):  BP: (!) 100/58 (19 0935)    Physical Exam:  General: well developed, well  nourished    . Chest Wall: no tenderness. Extremities: no cyanosis or edema, or clubbing. Pulses: 2+ and symmetric.  Pelvic:   Normal exam     Laboratory:  Lab Results   Component Value Date    WBC 7.12 03/13/2019    HGB 12.1 03/13/2019    HCT 37.8 03/13/2019    MCV 84 03/13/2019     03/13/2019       Ultrasound:  Results for orders placed in visit on 12/19/18   US Pelvis Comp with Transvag NON-OB (xpd    Narrative EXAMINATION:  US PELVIS COMP WITH TRANSVAG NON-OB (XPD)    CLINICAL HISTORY:  Hypertrophy of uterus    TECHNIQUE:  Transabdominal sonography of the pelvis was performed, followed by transvaginal sonography to better evaluate the uterus and ovaries.    COMPARISON:  None.    FINDINGS:  The uterus is enlarged in size and measures 10.7 x 5.6 x 5.6 cm.  The endometrial stripe is 5 mm in its visualized segment, partially displaced by uterine fibroid..  The uterus contains 3 masses most compatible with fibroids.  The largest is in the fundus of the uterus along the right and measures 4.7 x 5 x 5 cm, displacing the endometrial stripe and likely submucosal.  There are 2 smaller fibroid, measuring 2.4 x 2.1 x 2.2 cm intramural within the body of the uterus and 2.3 x 2.1 x 2.1 cm intramural along the posterior body of the uterus..    The right ovary measures 2.6 x 3.1 x 2.1 cm.  The left ovary measures 4.7 x 4.4 x 2.5cm.  It contains a 3.2 x 3.1 x 2.1 cm complex cystic mass with an echogenic nodule without flow.  There is appropriate flow in the ovaries.      Impression Uterine enlargement on the basis of uterine fibroids.    Complex cystic mass within the left ovary.  Recommend ultrasound follow-up in 8-12 weeks.    This report was flagged in Epic as abnormal.      Electronically signed by: Maranda Soler MD  Date:    12/19/2018  Time:    16:02           ASSESSMENT/PLAN:     There are no hospital problems to display for this patient.      To OR for open myomectomy   Risks and benefits explained in detail to  patient, including but not limited to damage to internal organs, including but not limited to bowel, bladder, uterus, tubes, ovaries, nerves, arteries, veins, possible need for blood transfusion. Patient is aware of all risks and desires surgery. All questions answered. Consents signed.

## 2019-03-13 NOTE — PROGRESS NOTES
Patient ID: Desiree Salas is a 43 y.o. female.    Chief Complaint:  PRE op for uterine fibroids  43 year old here before surgery to sign consents and discuss procedure.  Patient without pain or bleeding.  Three fibroids seen in her uterus and the largest fibroid is impinging on the uterine lining.  2 smaller fibroids noted.  Open myomectomy scheduled.  Risk and benefits discussed as well as alternatives.  Desires future fertility.  Moods stable but looking for another psychiatrist.       Patient Active Problem List   Diagnosis    Anxiety disorder    Depression    Attention deficit hyperactivity disorder (ADHD), predominantly inattentive type    Ganglion cyst of wrist, left       History of Present Illness    Here for preop visit.     Past Medical History:   Diagnosis Date    ADHD (attention deficit hyperactivity disorder), inattentive type     Anxiety     Asthma     exercise induced; no inhaler use    Depression        Past Surgical History:   Procedure Laterality Date    CYST REMOVAL      groin area    DILATION AND CURETTAGE OF UTERUS      due to heavy/irregular menses    EXCISION, GANGLION CYST, WRIST LEFT Left 2018    Performed by Walter [facility]ner Jr[facility] MD at [facility] OR       OB History    Para Term  AB Living   1 0 0 0 1 0   SAB TAB Ectopic Multiple Live Births   1 0 0 0 0      # Outcome Date GA Lbr Brent/2nd Weight Sex Delivery Anes PTL Lv   1 SAB                   Patient's last menstrual period was 2019.   Date of Last Pap: 2018    Review of Systems  Review of Systems   Gastrointestinal: Negative for abdominal pain.   Endocrine: Negative for heat intolerance.   Genitourinary: Negative for difficulty urinating, dysuria, pelvic pain and vaginal bleeding.   Psychiatric/Behavioral: Positive for dysphoric mood. The patient is nervous/anxious.         Objective:   Physical Exam:        Pulmonary/Chest: Breath sounds normal.                  Musculoskeletal:  Normal range of motion.         Psychiatric: She has a normal mood and affect.        Assessment/ Plan:     1. Attention deficit hyperactivity disorder (ADHD), predominantly inattentive type  Ambulatory consult to Psychiatry   2. Anxiety  Ambulatory consult to Psychiatry   3. Depression, unspecified depression type  Ambulatory consult to Psychiatry   4. Pre-op evaluation         To OR for myomectomy   All risks and benefits explained, including but not limited to damage to internal organs, including but not limited to uterus, tubes, ovaries, vagina, vulva, urethra, possible inability to remove all tissue, possible hysterectomy, possible blood transfusion, possible need to convert to open procedure. Patient is aware of all risks and desires surgery. All questions were answered. Consents were signed.

## 2019-03-13 NOTE — DISCHARGE INSTRUCTIONS
PRE-ADMIT TESTING -  [phone]    [address]  [place] [facility]          Your surgery has been scheduled at [facility]. We are pleased to have the opportunity to serve you. For Further Information please call [phone].    On the day of surgery please report to the Information Desk on the 1st floor.    · CONTACT YOUR PHYSICIAN'S OFFICE THE DAY PRIOR TO YOUR SURGERY TO OBTAIN YOUR ARRIVAL TIME.     · The evening before surgery do not eat anything after 9 p.m. ( this includes hard candy, chewing gum and mints).  You may only have GATORADE, POWERADE AND WATER  from 9 p.m. until you leave your home.   DO NOT DRINK ANY LIQUIDS ON THE WAY TO THE HOSPITAL.      SPECIAL MEDICATION INSTRUCTIONS: TAKE medications checked off by the Anesthesiologist on your Medication List.    Angiogram Patients: Take medications as instructed by your physician, including aspirin.     Surgery Patients:    If you take ASPIRIN - Your PHYSICIAN/SURGEON will need to inform you IF/OR when you need to stop taking aspirin prior to your surgery.     Do Not take any medications containing IBUPROFEN.  Do Not Wear any make-up or dark nail polish   (especially eye make-up) to surgery. If you come to surgery with makeup on you will be required to remove the makeup or nail polish.    Do not shave your surgical area at least 5 days prior to your surgery. The surgical prep will be performed at the hospital according to Infection Control regulations.    Leave all valuables at home.   Do Not wear any jewelry or watches, including any metal in body piercings. Jewelry must be removed prior to coming to the hospital.  There is a possibility that rings that are unable to be removed may be cut off if they are on the surgical extremity.    Contact Lens must be removed before surgery. Either do not wear the contact lens or bring a case and solution for storage.  Please bring a container for eyeglasses or dentures as required.  Bring  any paperwork your physician has provided, such as consent forms,  history and physicals, doctor's orders, etc.   Bring comfortable clothes that are loose fitting to wear upon discharge. Take into consideration the type of surgery being performed.  Maintain your diet as advised per your physician the day prior to surgery.      Adequate rest the night before surgery is advised.   Park in the Parking lot behind the hospital or in the [place] [facility] across the street from the parking lot. Parking is complimentary.  If you will be discharged the same day as your procedure, please arrange for a responsible adult to drive you home or to accompany you if traveling by taxi.   YOU WILL NOT BE PERMITTED TO DRIVE OR TO LEAVE THE HOSPITAL ALONE AFTER SURGERY.   It is strongly recommended that you arrange for someone to remain with you for the first 24 hrs following your surgery.       Thank you for your cooperation.  The Staff of [facility].        Bathing Instructions                                                                 Please shower the evening before and morning of your procedure with    ANTIBACTERIAL SOAP. ( DIAL, etc )  Concentrate on the surgical area   for at least 3 minutes and rinse completely. Dry off as usual.   Do not use any deodorant, powder, body lotions, perfume, after shave or    cologne.

## 2019-03-14 ENCOUNTER — TELEPHONE (OUTPATIENT)
Dept: OBSTETRICS AND GYNECOLOGY | Facility: CLINIC | Age: 44
End: 2019-03-14

## 2019-03-15 ENCOUNTER — HOSPITAL ENCOUNTER (INPATIENT)
Facility: OTHER | Age: 44
LOS: 3 days | Discharge: HOME | DRG: 743 | End: 2019-03-18
Attending: OBSTETRICS & GYNECOLOGY | Admitting: OBSTETRICS & GYNECOLOGY
Payer: COMMERCIAL

## 2019-03-15 ENCOUNTER — ANESTHESIA (OUTPATIENT)
Dept: SURGERY | Facility: OTHER | Age: 44
DRG: 743 | End: 2019-03-15
Payer: COMMERCIAL

## 2019-03-15 DIAGNOSIS — D21.9 LEIOMYOMA: ICD-10-CM

## 2019-03-15 DIAGNOSIS — F90.0 ATTENTION DEFICIT HYPERACTIVITY DISORDER (ADHD), PREDOMINANTLY INATTENTIVE TYPE: ICD-10-CM

## 2019-03-15 DIAGNOSIS — Z98.890 S/P MYOMECTOMY: Primary | ICD-10-CM

## 2019-03-15 LAB
B-HCG UR QL: NEGATIVE
CTP QC/QA: YES

## 2019-03-15 PROCEDURE — 25000003 PHARM REV CODE 250: Performed by: NURSE ANESTHETIST, CERTIFIED REGISTERED

## 2019-03-15 PROCEDURE — 25000003 PHARM REV CODE 250: Performed by: OBSTETRICS & GYNECOLOGY

## 2019-03-15 PROCEDURE — 99900035 HC TECH TIME PER 15 MIN (STAT)

## 2019-03-15 PROCEDURE — 94799 UNLISTED PULMONARY SVC/PX: CPT

## 2019-03-15 PROCEDURE — 25000003 PHARM REV CODE 250: Performed by: SPECIALIST

## 2019-03-15 PROCEDURE — 25000003 PHARM REV CODE 250: Performed by: ANESTHESIOLOGY

## 2019-03-15 PROCEDURE — 63600175 PHARM REV CODE 636 W HCPCS: Performed by: OBSTETRICS & GYNECOLOGY

## 2019-03-15 PROCEDURE — 36000707: Performed by: OBSTETRICS & GYNECOLOGY

## 2019-03-15 PROCEDURE — 27000221 HC OXYGEN, UP TO 24 HOURS

## 2019-03-15 PROCEDURE — 88307 TISSUE SPECIMEN TO PATHOLOGY - SURGERY: ICD-10-PCS | Mod: 26,,, | Performed by: PATHOLOGY

## 2019-03-15 PROCEDURE — 63600175 PHARM REV CODE 636 W HCPCS: Performed by: NURSE ANESTHETIST, CERTIFIED REGISTERED

## 2019-03-15 PROCEDURE — 36000706: Performed by: OBSTETRICS & GYNECOLOGY

## 2019-03-15 PROCEDURE — 88307 TISSUE EXAM BY PATHOLOGIST: CPT | Mod: 26,,, | Performed by: PATHOLOGY

## 2019-03-15 PROCEDURE — 11000001 HC ACUTE MED/SURG PRIVATE ROOM

## 2019-03-15 PROCEDURE — 71000033 HC RECOVERY, INTIAL HOUR: Performed by: OBSTETRICS & GYNECOLOGY

## 2019-03-15 PROCEDURE — 58140 MYOMECTOMY ABDOM METHOD: CPT | Mod: 82,,, | Performed by: OBSTETRICS & GYNECOLOGY

## 2019-03-15 PROCEDURE — 81025 URINE PREGNANCY TEST: CPT | Performed by: ANESTHESIOLOGY

## 2019-03-15 PROCEDURE — 94761 N-INVAS EAR/PLS OXIMETRY MLT: CPT

## 2019-03-15 PROCEDURE — 88307 TISSUE EXAM BY PATHOLOGIST: CPT | Performed by: PATHOLOGY

## 2019-03-15 PROCEDURE — 63600175 PHARM REV CODE 636 W HCPCS: Performed by: SPECIALIST

## 2019-03-15 PROCEDURE — 71000039 HC RECOVERY, EACH ADD'L HOUR: Performed by: OBSTETRICS & GYNECOLOGY

## 2019-03-15 PROCEDURE — 58140 PR MYOMECTOMY 1-4,W/TOT 250GMS/<,ABD APPRCH: ICD-10-PCS | Mod: 82,,, | Performed by: OBSTETRICS & GYNECOLOGY

## 2019-03-15 PROCEDURE — 37000009 HC ANESTHESIA EA ADD 15 MINS: Performed by: OBSTETRICS & GYNECOLOGY

## 2019-03-15 PROCEDURE — 37000008 HC ANESTHESIA 1ST 15 MINUTES: Performed by: OBSTETRICS & GYNECOLOGY

## 2019-03-15 PROCEDURE — 58140 MYOMECTOMY ABDOM METHOD: CPT | Mod: ,,, | Performed by: OBSTETRICS & GYNECOLOGY

## 2019-03-15 PROCEDURE — 58140 PR MYOMECTOMY 1-4,W/TOT 250GMS/<,ABD APPRCH: ICD-10-PCS | Mod: ,,, | Performed by: OBSTETRICS & GYNECOLOGY

## 2019-03-15 RX ORDER — OXYCODONE AND ACETAMINOPHEN 10; 325 MG/1; MG/1
1 TABLET ORAL EVERY 6 HOURS PRN
Status: DISCONTINUED | OUTPATIENT
Start: 2019-03-15 | End: 2019-03-18 | Stop reason: HOSPADM

## 2019-03-15 RX ORDER — HYDROMORPHONE HYDROCHLORIDE 2 MG/ML
0.4 INJECTION, SOLUTION INTRAMUSCULAR; INTRAVENOUS; SUBCUTANEOUS EVERY 5 MIN PRN
Status: DISCONTINUED | OUTPATIENT
Start: 2019-03-15 | End: 2019-03-15 | Stop reason: HOSPADM

## 2019-03-15 RX ORDER — LIDOCAINE HCL/PF 100 MG/5ML
SYRINGE (ML) INTRAVENOUS
Status: DISCONTINUED | OUTPATIENT
Start: 2019-03-15 | End: 2019-03-15

## 2019-03-15 RX ORDER — GLYCOPYRROLATE 0.2 MG/ML
INJECTION INTRAMUSCULAR; INTRAVENOUS
Status: DISCONTINUED | OUTPATIENT
Start: 2019-03-15 | End: 2019-03-15

## 2019-03-15 RX ORDER — KETOROLAC TROMETHAMINE 30 MG/ML
30 INJECTION, SOLUTION INTRAMUSCULAR; INTRAVENOUS EVERY 6 HOURS
Status: COMPLETED | OUTPATIENT
Start: 2019-03-15 | End: 2019-03-16

## 2019-03-15 RX ORDER — SODIUM CHLORIDE, SODIUM LACTATE, POTASSIUM CHLORIDE, CALCIUM CHLORIDE 600; 310; 30; 20 MG/100ML; MG/100ML; MG/100ML; MG/100ML
INJECTION, SOLUTION INTRAVENOUS CONTINUOUS
Status: DISCONTINUED | OUTPATIENT
Start: 2019-03-15 | End: 2019-03-18 | Stop reason: HOSPADM

## 2019-03-15 RX ORDER — PREGABALIN 75 MG/1
75 CAPSULE ORAL ONCE
Status: COMPLETED | OUTPATIENT
Start: 2019-03-15 | End: 2019-03-15

## 2019-03-15 RX ORDER — DIPHENHYDRAMINE HYDROCHLORIDE 50 MG/ML
25 INJECTION INTRAMUSCULAR; INTRAVENOUS EVERY 6 HOURS PRN
Status: DISCONTINUED | OUTPATIENT
Start: 2019-03-15 | End: 2019-03-15 | Stop reason: HOSPADM

## 2019-03-15 RX ORDER — MIDAZOLAM HYDROCHLORIDE 1 MG/ML
INJECTION INTRAMUSCULAR; INTRAVENOUS
Status: DISCONTINUED | OUTPATIENT
Start: 2019-03-15 | End: 2019-03-15

## 2019-03-15 RX ORDER — SODIUM CHLORIDE 0.9 % (FLUSH) 0.9 %
3 SYRINGE (ML) INJECTION
Status: DISCONTINUED | OUTPATIENT
Start: 2019-03-15 | End: 2019-03-18 | Stop reason: HOSPADM

## 2019-03-15 RX ORDER — IBUPROFEN 600 MG/1
600 TABLET ORAL EVERY 6 HOURS
Status: DISCONTINUED | OUTPATIENT
Start: 2019-03-16 | End: 2019-03-18 | Stop reason: HOSPADM

## 2019-03-15 RX ORDER — MEPERIDINE HYDROCHLORIDE 25 MG/ML
12.5 INJECTION INTRAMUSCULAR; INTRAVENOUS; SUBCUTANEOUS ONCE AS NEEDED
Status: DISCONTINUED | OUTPATIENT
Start: 2019-03-15 | End: 2019-03-15 | Stop reason: HOSPADM

## 2019-03-15 RX ORDER — DEXAMETHASONE SODIUM PHOSPHATE 4 MG/ML
INJECTION, SOLUTION INTRA-ARTICULAR; INTRALESIONAL; INTRAMUSCULAR; INTRAVENOUS; SOFT TISSUE
Status: DISCONTINUED | OUTPATIENT
Start: 2019-03-15 | End: 2019-03-15

## 2019-03-15 RX ORDER — LIDOCAINE HYDROCHLORIDE 10 MG/ML
0.5 INJECTION, SOLUTION EPIDURAL; INFILTRATION; INTRACAUDAL; PERINEURAL ONCE
Status: DISCONTINUED | OUTPATIENT
Start: 2019-03-15 | End: 2019-03-15 | Stop reason: HOSPADM

## 2019-03-15 RX ORDER — FENTANYL CITRATE 50 UG/ML
INJECTION, SOLUTION INTRAMUSCULAR; INTRAVENOUS
Status: DISCONTINUED | OUTPATIENT
Start: 2019-03-15 | End: 2019-03-15

## 2019-03-15 RX ORDER — SODIUM CHLORIDE, SODIUM LACTATE, POTASSIUM CHLORIDE, CALCIUM CHLORIDE 600; 310; 30; 20 MG/100ML; MG/100ML; MG/100ML; MG/100ML
INJECTION, SOLUTION INTRAVENOUS CONTINUOUS PRN
Status: DISCONTINUED | OUTPATIENT
Start: 2019-03-15 | End: 2019-03-15

## 2019-03-15 RX ORDER — BISACODYL 10 MG
10 SUPPOSITORY, RECTAL RECTAL DAILY PRN
Status: DISCONTINUED | OUTPATIENT
Start: 2019-03-15 | End: 2019-03-18 | Stop reason: HOSPADM

## 2019-03-15 RX ORDER — OXYCODONE HYDROCHLORIDE 5 MG/1
5 TABLET ORAL EVERY 6 HOURS PRN
Status: DISCONTINUED | OUTPATIENT
Start: 2019-03-15 | End: 2019-03-18 | Stop reason: HOSPADM

## 2019-03-15 RX ORDER — ONDANSETRON HYDROCHLORIDE 2 MG/ML
INJECTION, SOLUTION INTRAMUSCULAR; INTRAVENOUS
Status: DISCONTINUED | OUTPATIENT
Start: 2019-03-15 | End: 2019-03-15

## 2019-03-15 RX ORDER — PROPOFOL 10 MG/ML
VIAL (ML) INTRAVENOUS
Status: DISCONTINUED | OUTPATIENT
Start: 2019-03-15 | End: 2019-03-15

## 2019-03-15 RX ORDER — OXYCODONE HYDROCHLORIDE 5 MG/1
5 TABLET ORAL
Status: DISCONTINUED | OUTPATIENT
Start: 2019-03-15 | End: 2019-03-15 | Stop reason: HOSPADM

## 2019-03-15 RX ORDER — CEFAZOLIN SODIUM 1 G/3ML
2 INJECTION, POWDER, FOR SOLUTION INTRAMUSCULAR; INTRAVENOUS
Status: COMPLETED | OUTPATIENT
Start: 2019-03-15 | End: 2019-03-15

## 2019-03-15 RX ORDER — ONDANSETRON 2 MG/ML
4 INJECTION INTRAMUSCULAR; INTRAVENOUS DAILY PRN
Status: DISCONTINUED | OUTPATIENT
Start: 2019-03-15 | End: 2019-03-15 | Stop reason: HOSPADM

## 2019-03-15 RX ORDER — NEOSTIGMINE METHYLSULFATE 1 MG/ML
INJECTION, SOLUTION INTRAVENOUS
Status: DISCONTINUED | OUTPATIENT
Start: 2019-03-15 | End: 2019-03-15

## 2019-03-15 RX ORDER — MUPIROCIN 20 MG/G
1 OINTMENT TOPICAL 2 TIMES DAILY
Status: DISCONTINUED | OUTPATIENT
Start: 2019-03-15 | End: 2019-03-18 | Stop reason: HOSPADM

## 2019-03-15 RX ORDER — ROCURONIUM BROMIDE 10 MG/ML
INJECTION, SOLUTION INTRAVENOUS
Status: DISCONTINUED | OUTPATIENT
Start: 2019-03-15 | End: 2019-03-15

## 2019-03-15 RX ORDER — ACETAMINOPHEN 500 MG
1000 TABLET ORAL
Status: COMPLETED | OUTPATIENT
Start: 2019-03-15 | End: 2019-03-15

## 2019-03-15 RX ORDER — FENTANYL CITRATE 50 UG/ML
25 INJECTION, SOLUTION INTRAMUSCULAR; INTRAVENOUS EVERY 5 MIN PRN
Status: DISCONTINUED | OUTPATIENT
Start: 2019-03-15 | End: 2019-03-15 | Stop reason: HOSPADM

## 2019-03-15 RX ADMIN — SODIUM CHLORIDE, SODIUM LACTATE, POTASSIUM CHLORIDE, AND CALCIUM CHLORIDE: 600; 310; 30; 20 INJECTION, SOLUTION INTRAVENOUS at 09:03

## 2019-03-15 RX ADMIN — PREGABALIN 75 MG: 75 CAPSULE ORAL at 07:03

## 2019-03-15 RX ADMIN — ACETAMINOPHEN 1000 MG: 500 TABLET ORAL at 07:03

## 2019-03-15 RX ADMIN — PROPOFOL 30 MG: 10 INJECTION, EMULSION INTRAVENOUS at 10:03

## 2019-03-15 RX ADMIN — SODIUM CHLORIDE, SODIUM LACTATE, POTASSIUM CHLORIDE, AND CALCIUM CHLORIDE: .6; .31; .03; .02 INJECTION, SOLUTION INTRAVENOUS at 09:03

## 2019-03-15 RX ADMIN — CEFAZOLIN 2 G: 330 INJECTION, POWDER, FOR SOLUTION INTRAMUSCULAR; INTRAVENOUS at 08:03

## 2019-03-15 RX ADMIN — FENTANYL CITRATE 50 MCG: 50 INJECTION, SOLUTION INTRAMUSCULAR; INTRAVENOUS at 10:03

## 2019-03-15 RX ADMIN — NEOSTIGMINE METHYLSULFATE 4 MG: 1 INJECTION INTRAVENOUS at 10:03

## 2019-03-15 RX ADMIN — MUPIROCIN 1 G: 20 OINTMENT TOPICAL at 09:03

## 2019-03-15 RX ADMIN — OXYCODONE HYDROCHLORIDE 5 MG: 5 TABLET ORAL at 06:03

## 2019-03-15 RX ADMIN — FENTANYL CITRATE 50 MCG: 50 INJECTION, SOLUTION INTRAMUSCULAR; INTRAVENOUS at 09:03

## 2019-03-15 RX ADMIN — MUPIROCIN 1 G: 20 OINTMENT TOPICAL at 01:03

## 2019-03-15 RX ADMIN — PROPOFOL 30 MG: 10 INJECTION, EMULSION INTRAVENOUS at 09:03

## 2019-03-15 RX ADMIN — DEXAMETHASONE SODIUM PHOSPHATE 8 MG: 4 INJECTION, SOLUTION INTRAMUSCULAR; INTRAVENOUS at 08:03

## 2019-03-15 RX ADMIN — PROPOFOL 60 MG: 10 INJECTION, EMULSION INTRAVENOUS at 08:03

## 2019-03-15 RX ADMIN — PROPOFOL 150 MG: 10 INJECTION, EMULSION INTRAVENOUS at 08:03

## 2019-03-15 RX ADMIN — FENTANYL CITRATE 50 MCG: 50 INJECTION, SOLUTION INTRAMUSCULAR; INTRAVENOUS at 08:03

## 2019-03-15 RX ADMIN — HYDROMORPHONE HYDROCHLORIDE 0.4 MG: 2 INJECTION INTRAMUSCULAR; INTRAVENOUS; SUBCUTANEOUS at 11:03

## 2019-03-15 RX ADMIN — KETOROLAC TROMETHAMINE 30 MG: 30 INJECTION, SOLUTION INTRAMUSCULAR; INTRAVENOUS at 05:03

## 2019-03-15 RX ADMIN — MIDAZOLAM HYDROCHLORIDE 2 MG: 1 INJECTION, SOLUTION INTRAMUSCULAR; INTRAVENOUS at 07:03

## 2019-03-15 RX ADMIN — ONDANSETRON 4 MG: 2 INJECTION, SOLUTION INTRAMUSCULAR; INTRAVENOUS at 09:03

## 2019-03-15 RX ADMIN — OXYCODONE HYDROCHLORIDE 5 MG: 5 TABLET ORAL at 11:03

## 2019-03-15 RX ADMIN — KETOROLAC TROMETHAMINE 30 MG: 30 INJECTION, SOLUTION INTRAMUSCULAR; INTRAVENOUS at 01:03

## 2019-03-15 RX ADMIN — ROCURONIUM BROMIDE 40 MG: 10 INJECTION, SOLUTION INTRAVENOUS at 08:03

## 2019-03-15 RX ADMIN — SODIUM CHLORIDE, SODIUM LACTATE, POTASSIUM CHLORIDE, AND CALCIUM CHLORIDE: 600; 310; 30; 20 INJECTION, SOLUTION INTRAVENOUS at 07:03

## 2019-03-15 RX ADMIN — PROPOFOL 50 MG: 10 INJECTION, EMULSION INTRAVENOUS at 10:03

## 2019-03-15 RX ADMIN — ROCURONIUM BROMIDE 10 MG: 10 INJECTION, SOLUTION INTRAVENOUS at 09:03

## 2019-03-15 RX ADMIN — PROPOFOL 50 MG: 10 INJECTION, EMULSION INTRAVENOUS at 09:03

## 2019-03-15 RX ADMIN — GLYCOPYRROLATE 0.8 MG: 0.2 INJECTION, SOLUTION INTRAMUSCULAR; INTRAVENOUS at 10:03

## 2019-03-15 RX ADMIN — FENTANYL CITRATE 100 MCG: 50 INJECTION, SOLUTION INTRAMUSCULAR; INTRAVENOUS at 08:03

## 2019-03-15 RX ADMIN — LIDOCAINE HYDROCHLORIDE 50 MG: 20 INJECTION, SOLUTION INTRAVENOUS at 08:03

## 2019-03-15 RX ADMIN — CARBOXYMETHYLCELLULOSE SODIUM 2 DROP: 2.5 SOLUTION/ DROPS OPHTHALMIC at 08:03

## 2019-03-15 RX ADMIN — KETOROLAC TROMETHAMINE 30 MG: 30 INJECTION, SOLUTION INTRAMUSCULAR; INTRAVENOUS at 11:03

## 2019-03-15 RX ADMIN — SODIUM CHLORIDE, SODIUM LACTATE, POTASSIUM CHLORIDE, AND CALCIUM CHLORIDE: .6; .31; .03; .02 INJECTION, SOLUTION INTRAVENOUS at 01:03

## 2019-03-15 NOTE — INTERVAL H&P NOTE
The patient has been examined and the H&P has been reviewed:    I concur with the findings and no changes have occurred since H&P was written.    Anesthesia/Surgery risks, benefits and alternative options discussed and understood by patient/family.          Active Hospital Problems    Diagnosis  POA    Attention deficit hyperactivity disorder (ADHD), predominantly inattentive type [F90.0]  Yes      Resolved Hospital Problems   No resolved problems to display.

## 2019-03-15 NOTE — TRANSFER OF CARE
"Anesthesia Transfer of Care Note    Patient: Desiree Salas    Procedure(s) Performed: Procedure(s) (LRB):  MYOMECTOMY OPEN (N/A)    Patient location: PACU    Anesthesia Type: general    Transport from OR: Transported from OR on 2-3 L/min O2 by NC with adequate spontaneous ventilation    Post pain: adequate analgesia    Post assessment: no apparent anesthetic complications    Post vital signs: stable    Level of consciousness: awake    Nausea/Vomiting: no nausea/vomiting    Complications: none    Transfer of care protocol was followed      Last vitals:   Visit Vitals  /81 (BP Location: Right arm, Patient Position: Lying)   Pulse 88   Temp 36.6 °C (97.9 °F) (Oral)   Resp 18   Ht 5' 4" (1.626 m)   Wt 88.9 kg (196 lb)   LMP 03/03/2019   SpO2 100%   Breastfeeding? No   BMI 33.64 kg/m²     "

## 2019-03-15 NOTE — ANESTHESIA POSTPROCEDURE EVALUATION
"Anesthesia Post Evaluation    Patient: Desiree Salas    Procedure(s) Performed: Procedure(s) (LRB):  MYOMECTOMY OPEN (N/A)    Final Anesthesia Type: general  Patient location during evaluation: PACU  Patient participation: Yes- Able to Participate  Level of consciousness: awake and alert  Post-procedure vital signs: reviewed and stable  Pain management: adequate  Airway patency: patent  PONV status at discharge: No PONV  Anesthetic complications: no      Cardiovascular status: blood pressure returned to baseline and stable  Respiratory status: unassisted, spontaneous ventilation and room air  Hydration status: euvolemic  Follow-up not needed.        Visit Vitals  /74   Pulse 60   Temp 36.5 °C (97.7 °F) (Oral)   Resp 18   Ht 5' 4" (1.626 m)   Wt 88.9 kg (196 lb)   LMP 03/03/2019   SpO2 96%   Breastfeeding? No   BMI 33.64 kg/m²       Pain/Eric Score: Pain Rating Prior to Med Admin: 7 (3/15/2019 11:53 AM)  Pain Rating Post Med Admin: 0 (3/15/2019 12:01 PM)  Eric Score: 9 (3/15/2019 11:45 AM)        "

## 2019-03-15 NOTE — PLAN OF CARE
Problem: Adult Inpatient Plan of Care  Goal: Plan of Care Review  Outcome: Ongoing (interventions implemented as appropriate)  Pt received on 2LNC with adequate saturation. IS instruct completed. Patient verbalized and demonstrated IS use, achieved 1000cc volume.

## 2019-03-15 NOTE — OPERATIVE NOTE ADDENDUM
Certification of Assistant at Surgery       Surgery Date: 3/15/2019     Participating Surgeons:  Surgeon(s) and Role:     * Heena Castaneda MD - Primary     * Di Becerra MD - Assisting    Procedures:  Procedure(s) (LRB):  MYOMECTOMY OPEN (N/A)    Assistant Surgeon's Certification of Necessity:  I understand that section 1842 (b) (6) (d) of the Social Security Act generally prohibits Medicare Part B reasonable charge payment for the services of assistants at surgery in [facility] hospitals when qualified residents are available to furnish such services. I certify that the services for which payment is claimed were medically necessary, and that no qualified resident was available to perform the services. I further understand that these services are subject to post-payment review by the Medicare carrier.      Di Becerra MD    03/15/2019  1:19 PM

## 2019-03-15 NOTE — NURSING
Pt arrived to floor via stretcher with MIMI Naik and transferred to bed. IVF started, SCDs applied, oriented to room, call light placed within reach, bed low and locked, and family at bedside. Pt complains of pain 5/10. Edgar noted draining clear yellow urine to gravity. Incisions noted to lower abdomen with island dressing, CDI.  VSS. No acute distress noted at this time. Will continue to monitor.

## 2019-03-16 PROBLEM — Z98.890 S/P MYOMECTOMY: Status: ACTIVE | Noted: 2019-03-16

## 2019-03-16 LAB
BASOPHILS # BLD AUTO: 0 K/UL
BASOPHILS NFR BLD: 0 %
DIFFERENTIAL METHOD: ABNORMAL
EOSINOPHIL # BLD AUTO: 0 K/UL
EOSINOPHIL NFR BLD: 0.2 %
ERYTHROCYTE [DISTWIDTH] IN BLOOD BY AUTOMATED COUNT: 14.4 %
HCT VFR BLD AUTO: 34.4 %
HGB BLD-MCNC: 11.2 G/DL
LYMPHOCYTES # BLD AUTO: 1.2 K/UL
LYMPHOCYTES NFR BLD: 13 %
MCH RBC QN AUTO: 26.9 PG
MCHC RBC AUTO-ENTMCNC: 32.6 G/DL
MCV RBC AUTO: 83 FL
MONOCYTES # BLD AUTO: 0.9 K/UL
MONOCYTES NFR BLD: 9.4 %
NEUTROPHILS # BLD AUTO: 7.3 K/UL
NEUTROPHILS NFR BLD: 77.2 %
PLATELET # BLD AUTO: 205 K/UL
PMV BLD AUTO: 10.5 FL
RBC # BLD AUTO: 4.16 M/UL
WBC # BLD AUTO: 9.4 K/UL

## 2019-03-16 PROCEDURE — 25000003 PHARM REV CODE 250: Performed by: SPECIALIST

## 2019-03-16 PROCEDURE — 94799 UNLISTED PULMONARY SVC/PX: CPT

## 2019-03-16 PROCEDURE — 94761 N-INVAS EAR/PLS OXIMETRY MLT: CPT

## 2019-03-16 PROCEDURE — 99900035 HC TECH TIME PER 15 MIN (STAT)

## 2019-03-16 PROCEDURE — 11000001 HC ACUTE MED/SURG PRIVATE ROOM

## 2019-03-16 PROCEDURE — 36415 COLL VENOUS BLD VENIPUNCTURE: CPT

## 2019-03-16 PROCEDURE — 63600175 PHARM REV CODE 636 W HCPCS: Performed by: OBSTETRICS & GYNECOLOGY

## 2019-03-16 PROCEDURE — 25000003 PHARM REV CODE 250: Performed by: OBSTETRICS & GYNECOLOGY

## 2019-03-16 PROCEDURE — 99024 POSTOP FOLLOW-UP VISIT: CPT | Mod: ,,, | Performed by: OBSTETRICS & GYNECOLOGY

## 2019-03-16 PROCEDURE — 99024 PR POST-OP FOLLOW-UP VISIT: ICD-10-PCS | Mod: ,,, | Performed by: OBSTETRICS & GYNECOLOGY

## 2019-03-16 PROCEDURE — 85025 COMPLETE CBC W/AUTO DIFF WBC: CPT

## 2019-03-16 RX ADMIN — MUPIROCIN 1 G: 20 OINTMENT TOPICAL at 09:03

## 2019-03-16 RX ADMIN — IBUPROFEN 600 MG: 600 TABLET ORAL at 12:03

## 2019-03-16 RX ADMIN — OXYCODONE HYDROCHLORIDE AND ACETAMINOPHEN 1 TABLET: 10; 325 TABLET ORAL at 09:03

## 2019-03-16 RX ADMIN — SODIUM CHLORIDE, SODIUM LACTATE, POTASSIUM CHLORIDE, AND CALCIUM CHLORIDE: .6; .31; .03; .02 INJECTION, SOLUTION INTRAVENOUS at 04:03

## 2019-03-16 RX ADMIN — OXYCODONE HYDROCHLORIDE 5 MG: 5 TABLET ORAL at 12:03

## 2019-03-16 RX ADMIN — IBUPROFEN 600 MG: 600 TABLET ORAL at 05:03

## 2019-03-16 RX ADMIN — KETOROLAC TROMETHAMINE 30 MG: 30 INJECTION, SOLUTION INTRAMUSCULAR; INTRAVENOUS at 06:03

## 2019-03-16 RX ADMIN — OXYCODONE HYDROCHLORIDE AND ACETAMINOPHEN 1 TABLET: 10; 325 TABLET ORAL at 12:03

## 2019-03-16 RX ADMIN — OXYCODONE HYDROCHLORIDE AND ACETAMINOPHEN 1 TABLET: 10; 325 TABLET ORAL at 03:03

## 2019-03-16 NOTE — ASSESSMENT & PLAN NOTE
Encouraged ambulation, awaiting spontaneous void.  Advance diet as tolerated. Place abdominal binder per patient request.

## 2019-03-16 NOTE — PLAN OF CARE
Problem: Adult Inpatient Plan of Care  Goal: Plan of Care Review  Outcome: Ongoing (interventions implemented as appropriate)  Patient remains free from injury or falls. Vital signs stable throughout night on room air. Positions self independently. Edgar draining to gravity. Pain managed with scheduled IV and PO medications. Midline incision cdi, scant, dried drainage. Bed in low locked position and call light within reach. Purposeful rounding performed. Will continue to monitor.

## 2019-03-16 NOTE — PLAN OF CARE
Problem: Adult Inpatient Plan of Care  Goal: Plan of Care Review  Outcome: Ongoing (interventions implemented as appropriate)  Patient on RA. No distress. IS done on own. Will continue to monitor.

## 2019-03-16 NOTE — SUBJECTIVE & OBJECTIVE
Scheduled Meds:   ibuprofen  600 mg Oral Q6H    mupirocin  1 g Nasal BID     Continuous Infusions:   lactated ringers      lactated ringers 125 mL/hr at 03/16/19 0451     PRN Meds:bisacodyl, oxyCODONE, oxyCODONE-acetaminophen, sodium chloride 0.9%    Review of patient's allergies indicates:  No Known Allergies    Objective:     Vital Signs (Most Recent):  Temp: 97.9 °F (36.6 °C) (03/16/19 0810)  Pulse: 70 (03/16/19 0810)  Resp: 17 (03/16/19 0810)  BP: (!) 111/59 (03/16/19 0810)  SpO2: 97 % (03/16/19 0810) Vital Signs (24h Range):  Temp:  [97.7 °F (36.5 °C)-98.4 °F (36.9 °C)] 97.9 °F (36.6 °C)  Pulse:  [58-88] 70  Resp:  [16-18] 17  SpO2:  [90 %-100 %] 97 %  BP: (111-139)/(58-81) 111/59     Weight: 88.9 kg (196 lb)  Body mass index is 33.64 kg/m².  Patient's last menstrual period was 03/03/2019.    I&O (Last 24H):    Intake/Output Summary (Last 24 hours) at 3/16/2019 0922  Last data filed at 3/16/2019 0700  Gross per 24 hour   Intake 1740 ml   Output 2320 ml   Net -580 ml       Physical Exam:   Constitutional: She is oriented to person, place, and time. She appears well-developed and well-nourished. No distress.        Pulmonary/Chest: No respiratory distress.        Abdominal: Soft. She exhibits abdominal incision. She exhibits no distension. There is no tenderness. There is no rebound and no guarding.   Dressing intact, no drainage noted             Musculoskeletal: She exhibits no tenderness.   SCDs in place       Neurological: She is alert and oriented to person, place, and time.    Skin: Skin is warm and dry.    Psychiatric: She has a normal mood and affect.       Laboratory:  CBC:   Recent Labs   Lab 03/16/19  0441   WBC 9.40   RBC 4.16   HGB 11.2*   HCT 34.4*      MCV 83   MCH 26.9*   MCHC 32.6       Diagnostic Results:  none

## 2019-03-16 NOTE — PLAN OF CARE
Problem: Adult Inpatient Plan of Care  Goal: Plan of Care Review  Outcome: Ongoing (interventions implemented as appropriate)  No significant events this shift. Free from falls, skin breakdown or injury. Resp even and unlabored on room air. AAOx4. Tolerating Regular diet ordered. IVF's stopped, encouraged po fluids. Pain well controlled with po prn and scheduled pain medications. Voiding spont, with BRP. Ambulating around room and to bathroom several times a day. Placed abd binder per orders, tolerating well. Bed in lowest locked position, side rails elevated x2, cb in reach. Purposeful rounding done every hour.

## 2019-03-16 NOTE — PLAN OF CARE
03/16/19 1345   Discharge Assessment   Assessment Type Discharge Planning Assessment   Assessment information obtained from? Patient   Prior to hospitilization cognitive status: Alert/Oriented   Prior to hospitalization functional status: Independent   Current cognitive status: Alert/Oriented   Current Functional Status: Independent  (requesting cane for home )   Is patient able to care for self after discharge? Yes   Patient's perception of discharge disposition admitted as an inpatient   Patient currently being followed by outpatient case management? No   Patient currently receives any other outside agency services? No   Equipment Currently Used at Home none   Do you have any problems affording any of your prescribed medications? TBD   Is the patient taking medications as prescribed? yes   Does the patient have transportation home? Yes   Does the patient receive services at the [facility]? No   Discharge Plan A Home with family   Discharge Plan B Home with family   DME Needed Upon Discharge  cane, straight  (pt requesting cane for home )   Patient/Family in Agreement with Plan yes    RNCM met with patient at the bedside.      Patient is alert and oriented with no communication barriers.      Prior to admission patient was independent with ADLs. Patient denies the use of HH or DME     PT REQUESTING DME CANE  .       Patients PCP is correct on the face sheet. Patient choice pharmacy is correct      Patient denies a history of mental illness.         Patients family will transport him home at discharge.         No CM needs identified at this time.       CM team will continue to follow.

## 2019-03-16 NOTE — HOSPITAL COURSE
POD#1: Patient is doing well with adequate pain control.  She is tolerating liquids without problems and ambulating in her room. Her miles was just removed and she has not voided yet.  She has not passed gas yet.  POD#2: Patient reports pain at the incision site which inhibits her movement. She has gotten to the bathroom but requires assistance. She just took ibuprofen and Percocet. She is tolerating diet but chose not to eat dinner due to the pain.  POD#3: feeling ready to go home today +flatus +domenico PO

## 2019-03-16 NOTE — PLAN OF CARE
Problem: Adult Inpatient Plan of Care  Goal: Plan of Care Review  Outcome: Ongoing (interventions implemented as appropriate)  Patient on RA with Sats 97%. IS done with good effort. No distress noted. Will continue to monitor.

## 2019-03-16 NOTE — PROGRESS NOTES
[facility]  Obstetrics & Gynecology  Progress Note    Patient Name: Desiree Salas  MRN: [id number]  Admission Date: 3/15/2019  Primary Care Provider: Tiffanie Lemos MD  Principal Problem: S/P myomectomy    Subjective:     HPI:  43 year old s/p open myomectomy        Scheduled Meds:   ibuprofen  600 mg Oral Q6H    mupirocin  1 g Nasal BID     Continuous Infusions:   lactated ringers      lactated ringers 125 mL/hr at 03/16/19 0451     PRN Meds:bisacodyl, oxyCODONE, oxyCODONE-acetaminophen, sodium chloride 0.9%    Review of patient's allergies indicates:  No Known Allergies    Objective:     Vital Signs (Most Recent):  Temp: 97.9 °F (36.6 °C) (03/16/19 0810)  Pulse: 70 (03/16/19 0810)  Resp: 17 (03/16/19 0810)  BP: (!) 111/59 (03/16/19 0810)  SpO2: 97 % (03/16/19 0810) Vital Signs (24h Range):  Temp:  [97.7 °F (36.5 °C)-98.4 °F (36.9 °C)] 97.9 °F (36.6 °C)  Pulse:  [58-88] 70  Resp:  [16-18] 17  SpO2:  [90 %-100 %] 97 %  BP: (111-139)/(58-81) 111/59     Weight: 88.9 kg (196 lb)  Body mass index is 33.64 kg/m².  Patient's last menstrual period was 03/03/2019.    I&O (Last 24H):    Intake/Output Summary (Last 24 hours) at 3/16/2019 0922  Last data filed at 3/16/2019 0700  Gross per 24 hour   Intake 1740 ml   Output 2320 ml   Net -580 ml       Physical Exam:   Constitutional: She is oriented to person, place, and time. She appears well-developed and well-nourished. No distress.        Pulmonary/Chest: No respiratory distress.        Abdominal: Soft. She exhibits abdominal incision. She exhibits no distension. There is no tenderness. There is no rebound and no guarding.   Dressing intact, no drainage noted             Musculoskeletal: She exhibits no tenderness.   SCDs in place       Neurological: She is alert and oriented to person, place, and time.    Skin: Skin is warm and dry.    Psychiatric: She has a normal mood and affect.       Laboratory:  CBC:   Recent Labs   Lab  03/16/19  0441   WBC 9.40   RBC 4.16   HGB 11.2*   HCT 34.4*      MCV 83   MCH 26.9*   MCHC 32.6       Diagnostic Results:  none    Assessment/Plan:     * S/P myomectomy    Encouraged ambulation, awaiting spontaneous void.  Advance diet as tolerated. Place abdominal binder per patient request.          Diana Lee MD  Obstetrics & Gynecology  [facility]

## 2019-03-16 NOTE — NURSING
Edgar removed per doctor's order. Catheter intact, Patient tolerated well. Will continue to monitor.

## 2019-03-17 PROCEDURE — 99024 PR POST-OP FOLLOW-UP VISIT: ICD-10-PCS | Mod: ,,, | Performed by: OBSTETRICS & GYNECOLOGY

## 2019-03-17 PROCEDURE — 25000003 PHARM REV CODE 250: Performed by: SPECIALIST

## 2019-03-17 PROCEDURE — 25000003 PHARM REV CODE 250: Performed by: OBSTETRICS & GYNECOLOGY

## 2019-03-17 PROCEDURE — 94761 N-INVAS EAR/PLS OXIMETRY MLT: CPT

## 2019-03-17 PROCEDURE — 11000001 HC ACUTE MED/SURG PRIVATE ROOM

## 2019-03-17 PROCEDURE — 99024 POSTOP FOLLOW-UP VISIT: CPT | Mod: ,,, | Performed by: OBSTETRICS & GYNECOLOGY

## 2019-03-17 RX ADMIN — MUPIROCIN 1 G: 20 OINTMENT TOPICAL at 09:03

## 2019-03-17 RX ADMIN — IBUPROFEN 600 MG: 600 TABLET ORAL at 12:03

## 2019-03-17 RX ADMIN — OXYCODONE HYDROCHLORIDE AND ACETAMINOPHEN 1 TABLET: 10; 325 TABLET ORAL at 06:03

## 2019-03-17 RX ADMIN — OXYCODONE HYDROCHLORIDE 5 MG: 5 TABLET ORAL at 06:03

## 2019-03-17 RX ADMIN — IBUPROFEN 600 MG: 600 TABLET ORAL at 11:03

## 2019-03-17 RX ADMIN — IBUPROFEN 600 MG: 600 TABLET ORAL at 05:03

## 2019-03-17 RX ADMIN — OXYCODONE HYDROCHLORIDE AND ACETAMINOPHEN 1 TABLET: 10; 325 TABLET ORAL at 11:03

## 2019-03-17 RX ADMIN — IBUPROFEN 600 MG: 600 TABLET ORAL at 06:03

## 2019-03-17 RX ADMIN — MUPIROCIN 1 G: 20 OINTMENT TOPICAL at 10:03

## 2019-03-17 RX ADMIN — OXYCODONE HYDROCHLORIDE 5 MG: 5 TABLET ORAL at 02:03

## 2019-03-17 RX ADMIN — OXYCODONE HYDROCHLORIDE 5 MG: 5 TABLET ORAL at 10:03

## 2019-03-17 NOTE — ASSESSMENT & PLAN NOTE
Encouraged ambulation, awaiting spontaneous void.  Advance diet as tolerated. Place abdominal binder per patient request. Plan discharge in am with better pain control and PO intake.

## 2019-03-17 NOTE — PLAN OF CARE
Problem: Adult Inpatient Plan of Care  Goal: Plan of Care Review  Outcome: Ongoing (interventions implemented as appropriate)  VSS and afebrile, aaox4. abd island dressing CDI. No vaginal bleeding. Pain controlled with PO pain medication, tolerated well. Regular diet. Up ad nataliia. Able to make needs known. Plan of care reviewed with patient. purposeful hourly rounding done. Call light at bedside, bed at lowest position, brakes on, non skid socks on. Will continue to monitor.

## 2019-03-17 NOTE — PROGRESS NOTES
[facility]  Obstetrics & Gynecology  Progress Note    Patient Name: Desiree Salas  MRN: [id number]  Admission Date: 3/15/2019  Primary Care Provider: Tiffanie Lemos MD  Principal Problem: S/P myomectomy    Subjective:     HPI:  43 year old s/p open myomectomy    Interval History:     Scheduled Meds:   ibuprofen  600 mg Oral Q6H    mupirocin  1 g Nasal BID     Continuous Infusions:   lactated ringers      lactated ringers 125 mL/hr at 03/16/19 0451     PRN Meds:bisacodyl, oxyCODONE, oxyCODONE-acetaminophen, sodium chloride 0.9%    Review of patient's allergies indicates:  No Known Allergies    Objective:     Vital Signs (Most Recent):  Temp: 97.8 °F (36.6 °C) (03/17/19 0658)  Pulse: (!) 57 (03/17/19 0658)  Resp: 18 (03/17/19 0658)  BP: 120/63 (03/17/19 0658)  SpO2: 99 % (03/17/19 0658) Vital Signs (24h Range):  Temp:  [97.8 °F (36.6 °C)-98.6 °F (37 °C)] 97.8 °F (36.6 °C)  Pulse:  [55-83] 57  Resp:  [16-20] 18  SpO2:  [95 %-99 %] 99 %  BP: (107-121)/(55-69) 120/63     Weight: 88.9 kg (196 lb)  Body mass index is 33.64 kg/m².  Patient's last menstrual period was 03/03/2019.    I&O (Last 24H):    Intake/Output Summary (Last 24 hours) at 3/17/2019 0759  Last data filed at 3/17/2019 0600  Gross per 24 hour   Intake 4502.08 ml   Output 1650 ml   Net 2852.08 ml       Physical Exam:   Constitutional: She is oriented to person, place, and time. She appears well-developed and well-nourished.       Cardiovascular: Normal rate.     Pulmonary/Chest: Effort normal.        Abdominal: Soft. She exhibits abdominal incision (Clean, dry and intact). Tenderness: Mild incisional tenderness, no fundal tenderness.   Dressing intact, suggested taking off with a shower today     Genitourinary: Uterus normal.           Musculoskeletal: She exhibits edema (1+ edema). She exhibits no tenderness.       Neurological: She is alert and oriented to person, place, and time.     Psychiatric: She has a normal mood  and affect.       Laboratory:       Diagnostic Results:       Assessment/Plan:     * S/P myomectomy    Encouraged ambulation, awaiting spontaneous void.  Advance diet as tolerated. Place abdominal binder per patient request. Plan discharge in am with better pain control and PO intake.         Marianela Dewitt MD  Obstetrics & Gynecology  [facility]

## 2019-03-17 NOTE — PLAN OF CARE
Problem: Adult Inpatient Plan of Care  Goal: Plan of Care Review  Outcome: Ongoing (interventions implemented as appropriate)  Patient in no apparent distress. Sat's 98  % on room air. IS done . Will continue to monitor.

## 2019-03-17 NOTE — SUBJECTIVE & OBJECTIVE
Interval History:     Scheduled Meds:   ibuprofen  600 mg Oral Q6H    mupirocin  1 g Nasal BID     Continuous Infusions:   lactated ringers      lactated ringers 125 mL/hr at 03/16/19 0451     PRN Meds:bisacodyl, oxyCODONE, oxyCODONE-acetaminophen, sodium chloride 0.9%    Review of patient's allergies indicates:  No Known Allergies    Objective:     Vital Signs (Most Recent):  Temp: 97.8 °F (36.6 °C) (03/17/19 0658)  Pulse: (!) 57 (03/17/19 0658)  Resp: 18 (03/17/19 0658)  BP: 120/63 (03/17/19 0658)  SpO2: 99 % (03/17/19 0658) Vital Signs (24h Range):  Temp:  [97.8 °F (36.6 °C)-98.6 °F (37 °C)] 97.8 °F (36.6 °C)  Pulse:  [55-83] 57  Resp:  [16-20] 18  SpO2:  [95 %-99 %] 99 %  BP: (107-121)/(55-69) 120/63     Weight: 88.9 kg (196 lb)  Body mass index is 33.64 kg/m².  Patient's last menstrual period was 03/03/2019.    I&O (Last 24H):    Intake/Output Summary (Last 24 hours) at 3/17/2019 0759  Last data filed at 3/17/2019 0600  Gross per 24 hour   Intake 4502.08 ml   Output 1650 ml   Net 2852.08 ml       Physical Exam:   Constitutional: She is oriented to person, place, and time. She appears well-developed and well-nourished.       Cardiovascular: Normal rate.     Pulmonary/Chest: Effort normal.        Abdominal: Soft. She exhibits abdominal incision (Clean, dry and intact). Tenderness: Mild incisional tenderness, no fundal tenderness.   Dressing intact, suggested taking off with a shower today     Genitourinary: Uterus normal.           Musculoskeletal: She exhibits edema (1+ edema). She exhibits no tenderness.       Neurological: She is alert and oriented to person, place, and time.     Psychiatric: She has a normal mood and affect.       Laboratory:       Diagnostic Results:

## 2019-03-18 ENCOUNTER — TELEPHONE (OUTPATIENT)
Dept: INTERNAL MEDICINE | Facility: CLINIC | Age: 44
End: 2019-03-18

## 2019-03-18 VITALS
HEART RATE: 64 BPM | BODY MASS INDEX: 33.46 KG/M2 | SYSTOLIC BLOOD PRESSURE: 118 MMHG | DIASTOLIC BLOOD PRESSURE: 65 MMHG | HEIGHT: 64 IN | RESPIRATION RATE: 18 BRPM | TEMPERATURE: 99 F | WEIGHT: 196 LBS | OXYGEN SATURATION: 96 %

## 2019-03-18 PROCEDURE — 25000003 PHARM REV CODE 250: Performed by: OBSTETRICS & GYNECOLOGY

## 2019-03-18 PROCEDURE — 25000003 PHARM REV CODE 250: Performed by: SPECIALIST

## 2019-03-18 PROCEDURE — 99024 POSTOP FOLLOW-UP VISIT: CPT | Mod: ,,, | Performed by: OBSTETRICS & GYNECOLOGY

## 2019-03-18 PROCEDURE — 99024 PR POST-OP FOLLOW-UP VISIT: ICD-10-PCS | Mod: ,,, | Performed by: OBSTETRICS & GYNECOLOGY

## 2019-03-18 RX ORDER — OXYCODONE AND ACETAMINOPHEN 5; 325 MG/1; MG/1
1 TABLET ORAL EVERY 4 HOURS PRN
Qty: 45 TABLET | Refills: 0 | Status: SHIPPED | OUTPATIENT
Start: 2019-03-18 | End: 2019-05-10

## 2019-03-18 RX ORDER — IBUPROFEN 600 MG/1
600 TABLET ORAL EVERY 6 HOURS
Qty: 60 TABLET | Refills: 1 | Status: SHIPPED | OUTPATIENT
Start: 2019-03-18 | End: 2019-05-10

## 2019-03-18 RX ORDER — DOCUSATE SODIUM 100 MG/1
100 CAPSULE, LIQUID FILLED ORAL 2 TIMES DAILY
Qty: 60 CAPSULE | Refills: 1 | Status: SHIPPED | OUTPATIENT
Start: 2019-03-18 | End: 2019-05-10

## 2019-03-18 RX ADMIN — IBUPROFEN 600 MG: 600 TABLET ORAL at 05:03

## 2019-03-18 RX ADMIN — IBUPROFEN 600 MG: 600 TABLET ORAL at 12:03

## 2019-03-18 RX ADMIN — MUPIROCIN 1 G: 20 OINTMENT TOPICAL at 09:03

## 2019-03-18 RX ADMIN — OXYCODONE HYDROCHLORIDE 5 MG: 5 TABLET ORAL at 09:03

## 2019-03-18 RX ADMIN — OXYCODONE HYDROCHLORIDE AND ACETAMINOPHEN 1 TABLET: 10; 325 TABLET ORAL at 03:03

## 2019-03-18 NOTE — PLAN OF CARE
Met with patient at bedside to discuss discharge - patient continues to voice a need for a straight cane - Deyanira from [facility] gave approval to pull from our supply - [employer] [facility] to deliver to bedside      03/18/19 1121   Final Note   Assessment Type Final Discharge Note   Anticipated Discharge Disposition Home   What phone number can be called within the next 1-3 days to see how you are doing after discharge? [phone]   Discharge plans and expectations educations in teach back method with documentation complete? Yes   Right Care Referral Info   Post Acute Recommendation No Care

## 2019-03-18 NOTE — OP NOTE
DATE: (date: 03/15/2019)    OPERATIVE REPORT    PREOPERATIVE DIAGNOSIS  1. Uterine Fibroids  POSTOPERATIVE DIAGNOSIS  1. Uterine Fibroids    PROCEDURE:  1. Abdominal Myomectomy    SURGEON: Dr. Heena Castaneda    ASSISTANT: Dr. Becerra    ANESTHESIA: General    COMPLICATIONS: none       cc    No qualified resident available to assist     FINDINGS: enlarged fibroid uterus    PROCEDURE: Patient was taking to the operating room where general anesthesia was administered and found to be adequate.  She was prepped and draped.  A miles catheter was inserted into the bladder.      A Pfannenstiel skin incision was made with the scalpel and carried down to the underlying layer of fascia with the scalpel.  The incision was extended laterally with Malin scissors.  The superior aspect of the incision was grasped with the XYDOsner clamps, tented up and the underlying muscles were dissected off bluntly.  Attention was turned to the inferior aspect of the incision.  The underlying muscles were dissected off bluntly.  The rectus muscles were  in the midline.  The peritoneum was entered in digitally.  The incision was extended.  The bowel was packed away and a self retaining retractor was inserted/    All fibroids were removed in the following fashion: 20 Units of Pitressin diluted in 100cc of Normal saline was injected into the serosa overlying the fibroid.  An incision was made with a needle point bovie.  This incision was carried down to the fibroid.  The fibroid was grasped with a perforating towel clamp.  The fibroid was dissected in a circular fashion.  Hemostasis was maintained utilizing the focus.  Once the fibroid was completely enucleated, it was handed to the waiting surgical nurse.  The deepest layer of myometrium was reapproximated with 0- vicryl in a running, locked fashion.  The next layer of myometrium was reapproximated with 2-0 Vicryl in a running, locked fashion.  The serosa was reapproximated with 3-0  Monocryl in a baseball stitch fashion.  Excellent hemostasis was noted.      Largest fibroid was fundal.  Cavity was not entered in any fibroid removal.  5 in total.     The uterus was copiously irrigated.  Excellent hemostasis was noted.  The fascia was reapproximated with - vicryl in a running fashion.  The subcutaneous tissue was reapproximated with 2-0 plain in a running fashion.  The skin was closed with 4-0 Monocryl in a subcuticular fashion.  Sponge, lap, and needle counts were correct x 2.  The patient was taken to the recovery room in stable condition with the miles draining clear urine.

## 2019-03-18 NOTE — DISCHARGE SUMMARY
[facility]  Obstetrics & Gynecology  Discharge Summary    Patient Name: Desiree Salas  MRN: [id number]  Admission Date: 3/15/2019  Hospital Length of Stay: 3 days  Discharge Date and Time:  03/18/2019 10:25 AM  Attending Physician: Heena Castaneda MD   Discharging Provider: Rocío Olmos MD  Primary Care Provider: Tiffanie Lemos MD    HPI:  43 year old s/p open myomectomy    Hospital Course:  POD#1: Patient is doing well with adequate pain control.  She is tolerating liquids without problems and ambulating in her room. Her miles was just removed and she has not voided yet.  She has not passed gas yet.  POD#2: Patient reports pain at the incision site which inhibits her movement. She has gotten to the bathroom but requires assistance. She just took ibuprofen and Percocet. She is tolerating diet but chose not to eat dinner due to the pain.  POD#3: feeling ready to go home today +flatus +domenico PO     Procedure(s) (LRB):  MYOMECTOMY OPEN (N/A)         Significant Diagnostic Studies: Labs: All labs within the past 24 hours have been reviewed    Pending Diagnostic Studies:     None        Final Active Diagnoses:    Diagnosis Date Noted POA    PRINCIPAL PROBLEM:  S/P myomectomy [Z98.890] 03/16/2019 Not Applicable    Attention deficit hyperactivity disorder (ADHD), predominantly inattentive type [F90.0] 11/15/2018 Yes      Problems Resolved During this Admission:        Discharged Condition: good    Disposition:     Follow Up:  Follow-up Information     Heena Castaneda MD In 1 week.    Specialties:  Obstetrics and Gynecology, Gynecology, Obstetrics  Why:  For wound re-check  Contact information:  [address]  [address] [place] [address] [id number] [phone]                 Patient Instructions:   Patient Instructions:   1. Call the office for any bleeding >2 pads/hour for >2 hours, temperature >100.4, pain that is uncontrolled with medications, or for any other concerns.  2. Let soap  and water run over incision, do not scrub. Keep incision dry.  3. No driving while on narcotics.  4. Call if malodorous drainage or redness from incision  5. If no bowel movement for 1-2 days after discharge home, start Miralax (over the counter - take per package instructions) once a day. If still no bowel movement, increase to twice a day. Decrease to once a day or discontinue once having regular bowel movements.  No discharge procedures on file.  Medications:  Reconciled Home Medications:      Medication List      START taking these medications    docusate sodium 100 MG capsule  Commonly known as:  COLACE  Take 1 capsule (100 mg total) by mouth 2 (two) times daily.     ibuprofen 600 MG tablet  Commonly known as:  ADVIL,MOTRIN  Take 1 tablet (600 mg total) by mouth every 6 (six) hours.     oxyCODONE-acetaminophen 5-325 mg per tablet  Commonly known as:  PERCOCET  Take 1 tablet by mouth every 4 (four) hours as needed for Pain.        CONTINUE taking these medications    ARIPiprazole 10 MG Tab  Commonly known as:  ABILIFY  once daily.     DEPLIN ORAL  Take 15 mg by mouth once daily.     * FLUoxetine 40 MG capsule  Take 80 mg by mouth once daily.     * FLUoxetine 10 MG capsule  TK ONE C PO  DAILY ON DAYS 14-28 OF CYCLE PLUS FIRST DAY OF MENSTRUATION     glucosamine-chondroitin 500-400 mg tablet  Take 1 tablet by mouth once daily.     PRENATAL MULTIVITAMINS ORAL  Take 1 tablet by mouth once daily.     REXULTI 1 mg Tab  Generic drug:  brexpiprazole  TK 1 T PO D FOR 1 WEEK. INCREASE TO 2 T PO D     VYVANSE 50 MG capsule  Generic drug:  lisdexamfetamine  every morning.         * This list has 2 medication(s) that are the same as other medications prescribed for you. Read the directions carefully, and ask your doctor or other care provider to review them with you.                Rocío Olmos MD  Obstetrics & Gynecology  [facility]

## 2019-03-18 NOTE — PLAN OF CARE
Problem: Adult Inpatient Plan of Care  Goal: Plan of Care Review  Outcome: Ongoing (interventions implemented as appropriate)  Patient on RA. No distress. Will continue to monitor.

## 2019-03-18 NOTE — SUBJECTIVE & OBJECTIVE
Interval History: Pain well controlled on pain meds, voiding spontaneously, tolerating PO, +flatus    Scheduled Meds:   ibuprofen  600 mg Oral Q6H    mupirocin  1 g Nasal BID     Continuous Infusions:   lactated ringers      lactated ringers 125 mL/hr at 03/16/19 0451     PRN Meds:bisacodyl, oxyCODONE, oxyCODONE-acetaminophen, sodium chloride 0.9%    Review of patient's allergies indicates:  No Known Allergies    Objective:     Vital Signs (Most Recent):  Temp: 98.7 °F (37.1 °C) (03/18/19 0926)  Pulse: 60 (03/18/19 0926)  Resp: 16 (03/18/19 0926)  BP: 120/63 (03/18/19 0926)  SpO2: 97 % (03/18/19 0926) Vital Signs (24h Range):  Temp:  [97.6 °F (36.4 °C)-98.7 °F (37.1 °C)] 98.7 °F (37.1 °C)  Pulse:  [57-67] 60  Resp:  [16-18] 16  SpO2:  [94 %-98 %] 97 %  BP: ()/(50-63) 120/63     Weight: 88.9 kg (196 lb)  Body mass index is 33.64 kg/m².  Patient's last menstrual period was 03/03/2019.    I&O (Last 24H):    Intake/Output Summary (Last 24 hours) at 3/18/2019 1020  Last data filed at 3/18/2019 0500  Gross per 24 hour   Intake --   Output 1700 ml   Net -1700 ml       Physical Exam:   Constitutional: She is oriented to person, place, and time. She appears well-developed and well-nourished. No distress.    HENT:   Head: Normocephalic and atraumatic.      Cardiovascular: Normal rate, regular rhythm, normal heart sounds and intact distal pulses.     Pulmonary/Chest: Effort normal. No respiratory distress.        Abdominal: Soft. Bowel sounds are normal. She exhibits abdominal incision (c/d/i with steri strips). There is no guarding.     Genitourinary: Uterus normal. No vaginal discharge found.           Musculoskeletal: Normal range of motion and moves all extremeties.       Neurological: She is alert and oriented to person, place, and time.    Skin: Skin is warm. She is not diaphoretic.    Psychiatric: She has a normal mood and affect. Her behavior is normal. Judgment and thought content normal.        Laboratory:  I have personallly reviewed all pertinent lab results from the last 24 hours.    Diagnostic Results:  Labs: Reviewed

## 2019-03-18 NOTE — PLAN OF CARE
Problem: Adult Inpatient Plan of Care  Goal: Plan of Care Review  Outcome: Ongoing (interventions implemented as appropriate)  VSS and afebrile, AAOx4. abd island dressing CDI. No vaginal bleeding. Pain controlled with PO pain medication, tolerated well. Plan of care reviewed with patient. purposeful hourly rounding done. Call light at bedside, bed at lowest position, brakes on, non skid socks on. Will continue to monitor. Ambulates to  with standby assist. Purposeful hourly rounding done.

## 2019-03-18 NOTE — ASSESSMENT & PLAN NOTE
Has met milestones for DC  Meds to be sent to [facility] pharmacy  Return precautions given

## 2019-03-18 NOTE — TELEPHONE ENCOUNTER
Dr. Clement called informing Dr. Lemos that Pt is jt and she needs to go back to screening mammogram. She stated that a fax will be sent over with results    Patient with 2 mediastinal blakes, minimal drainage over last 24 hours. Blakes were removed at the bedside and covered with an occlusive dressing, patient tolerated the procedure well. CXR ordered now,

## 2019-03-18 NOTE — PROGRESS NOTES
[facility]  Obstetrics & Gynecology  Progress Note    Patient Name: Desiree Salas  MRN: [id number]  Admission Date: 3/15/2019  Primary Care Provider: Tiffanie Lemos MD  Principal Problem: S/P myomectomy    Subjective:     HPI:  43 year old s/p open myomectomy    Interval History: Pain well controlled on pain meds, voiding spontaneously, tolerating PO, +flatus    Scheduled Meds:   ibuprofen  600 mg Oral Q6H    mupirocin  1 g Nasal BID     Continuous Infusions:   lactated ringers      lactated ringers 125 mL/hr at 03/16/19 0451     PRN Meds:bisacodyl, oxyCODONE, oxyCODONE-acetaminophen, sodium chloride 0.9%    Review of patient's allergies indicates:  No Known Allergies    Objective:     Vital Signs (Most Recent):  Temp: 98.7 °F (37.1 °C) (03/18/19 0926)  Pulse: 60 (03/18/19 0926)  Resp: 16 (03/18/19 0926)  BP: 120/63 (03/18/19 0926)  SpO2: 97 % (03/18/19 0926) Vital Signs (24h Range):  Temp:  [97.6 °F (36.4 °C)-98.7 °F (37.1 °C)] 98.7 °F (37.1 °C)  Pulse:  [57-67] 60  Resp:  [16-18] 16  SpO2:  [94 %-98 %] 97 %  BP: ()/(50-63) 120/63     Weight: 88.9 kg (196 lb)  Body mass index is 33.64 kg/m².  Patient's last menstrual period was 03/03/2019.    I&O (Last 24H):    Intake/Output Summary (Last 24 hours) at 3/18/2019 1020  Last data filed at 3/18/2019 0500  Gross per 24 hour   Intake --   Output 1700 ml   Net -1700 ml       Physical Exam:   Constitutional: She is oriented to person, place, and time. She appears well-developed and well-nourished. No distress.    HENT:   Head: Normocephalic and atraumatic.      Cardiovascular: Normal rate, regular rhythm, normal heart sounds and intact distal pulses.     Pulmonary/Chest: Effort normal. No respiratory distress.        Abdominal: Soft. Bowel sounds are normal. She exhibits abdominal incision (c/d/i with steri strips). There is no guarding.     Genitourinary: Uterus normal. No vaginal discharge found.           Musculoskeletal: Normal  range of motion and moves all extremeties.       Neurological: She is alert and oriented to person, place, and time.    Skin: Skin is warm. She is not diaphoretic.    Psychiatric: She has a normal mood and affect. Her behavior is normal. Judgment and thought content normal.       Laboratory:  I have personallly reviewed all pertinent lab results from the last 24 hours.    Diagnostic Results:  Labs: Reviewed    Assessment/Plan:     * S/P myomectomy    Has met milestones for DC  Meds to be sent to [facility] pharmacy  Return precautions given         Rocío Olmos MD  Obstetrics & Gynecology  [facility]

## 2019-03-18 NOTE — PLAN OF CARE
Problem: Adult Inpatient Plan of Care  Goal: Plan of Care Review  Outcome: Outcome(s) achieved Date Met: 03/18/19  Eager & in agreement w/ DC. VU of DC instructions--paperwork & prescriptions passed & explained. IV removed w/ cath tip intact, WNL. Cane has arrived to pt room. To be DCd home w/ family-- will be escorted downstairs via  transport team once dressed, ready & ride arrives. Free from falls, injury, or skin breakdown this hospital admission.

## 2019-03-22 ENCOUNTER — OFFICE VISIT (OUTPATIENT)
Dept: OBSTETRICS AND GYNECOLOGY | Facility: CLINIC | Age: 44
End: 2019-03-22
Attending: OBSTETRICS & GYNECOLOGY
Payer: COMMERCIAL

## 2019-03-22 VITALS
BODY MASS INDEX: 34.45 KG/M2 | HEIGHT: 64 IN | WEIGHT: 201.81 LBS | SYSTOLIC BLOOD PRESSURE: 104 MMHG | DIASTOLIC BLOOD PRESSURE: 68 MMHG

## 2019-03-22 DIAGNOSIS — Z98.890 POST-OPERATIVE STATE: Primary | ICD-10-CM

## 2019-03-22 PROCEDURE — 99024 POSTOP FOLLOW-UP VISIT: CPT | Mod: S$GLB,,, | Performed by: OBSTETRICS & GYNECOLOGY

## 2019-03-22 PROCEDURE — 99024 PR POST-OP FOLLOW-UP VISIT: ICD-10-PCS | Mod: S$GLB,,, | Performed by: OBSTETRICS & GYNECOLOGY

## 2019-03-22 PROCEDURE — 99999 PR PBB SHADOW E&M-EST. PATIENT-LVL III: CPT | Mod: PBBFAC,,, | Performed by: OBSTETRICS & GYNECOLOGY

## 2019-03-22 PROCEDURE — 99999 PR PBB SHADOW E&M-EST. PATIENT-LVL III: ICD-10-PCS | Mod: PBBFAC,,, | Performed by: OBSTETRICS & GYNECOLOGY

## 2019-03-22 NOTE — PROGRESS NOTES
"                                                             Post op note       Subjective:       Desiree Salas is a 43 y.o. [id number] presents to the clinic 1 weeks status post open myomectomy for fibroids. Eating a regular diet without difficulty. Bowel movements are she is passing gas but no bowel movement . Pain controlled with medication   Patient without fever.  Working on walking more currently having assistance with a cane    The patient's allergies, and past medical and surgical histories were reviewed.    Review of Systems  Pertinent items are noted in HPI.      Objective:      /68   Ht 5' 4" (1.626 m)   Wt 91.6 kg (201 lb 13.3 oz)   LMP 2019   BMI 34.64 kg/m²   General:  alert, appears stated age and cooperative   Abdomen: soft, non-tender   Incision:       healing well, no drainage, no erythema, no hernia, no seroma, no swelling, no dehiscence, incision well approximated        Pathology:         Assessment:      Doing well postoperatively.  Operative findings again reviewed. Pathology report discussed.      Plan:      1. Continue any current medications.  2. Wound care discussed.  3. Activity restrictions: no lifting more than 20 pounds  4. Anticipated return to work: 2-3 weeks.  5. Follow up:1 week   Discussed medication to help with bowel movement- discussed weaning down on medication.     "

## 2019-03-23 ENCOUNTER — PATIENT MESSAGE (OUTPATIENT)
Dept: OBSTETRICS AND GYNECOLOGY | Facility: CLINIC | Age: 44
End: 2019-03-23

## 2019-03-29 ENCOUNTER — OFFICE VISIT (OUTPATIENT)
Dept: OBSTETRICS AND GYNECOLOGY | Facility: CLINIC | Age: 44
End: 2019-03-29
Attending: OBSTETRICS & GYNECOLOGY
Payer: COMMERCIAL

## 2019-03-29 VITALS
DIASTOLIC BLOOD PRESSURE: 60 MMHG | HEIGHT: 64 IN | SYSTOLIC BLOOD PRESSURE: 110 MMHG | WEIGHT: 191 LBS | BODY MASS INDEX: 32.61 KG/M2

## 2019-03-29 DIAGNOSIS — R82.90 URINE ABNORMALITY: Primary | ICD-10-CM

## 2019-03-29 DIAGNOSIS — Z98.890 POST-OPERATIVE STATE: ICD-10-CM

## 2019-03-29 PROCEDURE — 87086 URINE CULTURE/COLONY COUNT: CPT

## 2019-03-29 PROCEDURE — 99024 POSTOP FOLLOW-UP VISIT: CPT | Mod: S$GLB,,, | Performed by: OBSTETRICS & GYNECOLOGY

## 2019-03-29 PROCEDURE — 3008F PR BODY MASS INDEX (BMI) DOCUMENTED: ICD-10-PCS | Mod: CPTII,S$GLB,, | Performed by: OBSTETRICS & GYNECOLOGY

## 2019-03-29 PROCEDURE — 3008F BODY MASS INDEX DOCD: CPT | Mod: CPTII,S$GLB,, | Performed by: OBSTETRICS & GYNECOLOGY

## 2019-03-29 PROCEDURE — 99999 PR PBB SHADOW E&M-EST. PATIENT-LVL III: ICD-10-PCS | Mod: PBBFAC,,, | Performed by: OBSTETRICS & GYNECOLOGY

## 2019-03-29 PROCEDURE — 99999 PR PBB SHADOW E&M-EST. PATIENT-LVL III: CPT | Mod: PBBFAC,,, | Performed by: OBSTETRICS & GYNECOLOGY

## 2019-03-29 PROCEDURE — 99024 PR POST-OP FOLLOW-UP VISIT: ICD-10-PCS | Mod: S$GLB,,, | Performed by: OBSTETRICS & GYNECOLOGY

## 2019-03-29 RX ORDER — TERCONAZOLE 8 MG/G
1 CREAM VAGINAL NIGHTLY
Qty: 20 G | Refills: 0 | Status: SHIPPED | OUTPATIENT
Start: 2019-03-29 | End: 2019-04-01

## 2019-03-29 NOTE — PROGRESS NOTES
"                                                             Post op note       Subjective:       Desiree Salas is a 43 y.o. [id number] presents to the clinic 2 weeks status post open myomectomy for adnexal mass. Eating a regular diet without difficulty. Bowel movements are normal. The patient is not having any pain. Patient still having trouble ambulating after surgery but it is improving.  No complaints with her incision.  Reports raw feeling on her vulva after urination.  Patient no longer constipated.  Reports right thigh with decreased sensation since surgery     The patient's allergies, and past medical and surgical histories were reviewed.    Review of Systems  Pertinent items are noted in HPI.      Objective:      /60   Ht 5' 4" (1.626 m)   Wt 86.7 kg (191 lb 0.5 oz)   LMP 2019   BMI 32.79 kg/m²   General:  alert, appears stated age, cooperative and no distress   Abdomen: soft, bowel sounds active, non-tender   Incision:       healing well, no drainage, no erythema, no hernia, no seroma, no swelling, no dehiscence, incision well approximated        Pathology:  Benign        Assessment:      Doing well postoperatively.  Operative findings again reviewed. Pathology report discussed.      Plan:      1. Continue any current medications.  2. Wound care discussed.  3. Activity restrictions: no lifting more than 30 pounds  4. Anticipated return to work: now.  5. Follow up:in 4 weeks   Discussed calling if numbness not improving      "

## 2019-03-29 NOTE — LETTER
March 29, 2019      [facility], [place]  [address], [address]  [facility] [address]  Phone: [phone]  Fax: [phone]       Patient: Desiree Salas   YOB: 1975  Date of Visit: 03/29/2019    To Whom It May Concern:    Aquiles Salas  was at [facility] on 03/29/2019. She may return to work/school on 04/01/19 . If you have any questions or concerns, or if I can be of further assistance, please do not hesitate to contact me.    Sincerely,      Heena Castaneda MD

## 2019-03-31 LAB — BACTERIA UR CULT: NORMAL

## 2019-04-05 ENCOUNTER — PATIENT MESSAGE (OUTPATIENT)
Dept: OBSTETRICS AND GYNECOLOGY | Facility: CLINIC | Age: 44
End: 2019-04-05

## 2019-04-11 ENCOUNTER — PATIENT MESSAGE (OUTPATIENT)
Dept: OBSTETRICS AND GYNECOLOGY | Facility: CLINIC | Age: 44
End: 2019-04-11

## 2019-04-17 ENCOUNTER — PATIENT MESSAGE (OUTPATIENT)
Dept: OBSTETRICS AND GYNECOLOGY | Facility: CLINIC | Age: 44
End: 2019-04-17

## 2019-04-22 ENCOUNTER — PATIENT MESSAGE (OUTPATIENT)
Dept: OBSTETRICS AND GYNECOLOGY | Facility: CLINIC | Age: 44
End: 2019-04-22

## 2019-04-22 DIAGNOSIS — T88.59XA COMPLICATION OF ANESTHESIA, INITIAL ENCOUNTER: Primary | ICD-10-CM

## 2019-04-22 DIAGNOSIS — Z98.890 S/P MYOMECTOMY: ICD-10-CM

## 2019-04-22 DIAGNOSIS — R20.0 NUMBNESS, LIMB: ICD-10-CM

## 2019-04-29 ENCOUNTER — PATIENT MESSAGE (OUTPATIENT)
Dept: OBSTETRICS AND GYNECOLOGY | Facility: CLINIC | Age: 44
End: 2019-04-29

## 2019-05-02 ENCOUNTER — OFFICE VISIT (OUTPATIENT)
Dept: NEUROLOGY | Facility: CLINIC | Age: 44
End: 2019-05-02
Payer: COMMERCIAL

## 2019-05-02 VITALS
HEART RATE: 70 BPM | HEIGHT: 64 IN | DIASTOLIC BLOOD PRESSURE: 65 MMHG | BODY MASS INDEX: 33.46 KG/M2 | SYSTOLIC BLOOD PRESSURE: 106 MMHG | WEIGHT: 196 LBS

## 2019-05-02 DIAGNOSIS — M54.5 CHRONIC MIDLINE LOW BACK PAIN, WITH SCIATICA PRESENCE UNSPECIFIED: Primary | ICD-10-CM

## 2019-05-02 DIAGNOSIS — G89.29 CHRONIC MIDLINE LOW BACK PAIN, WITH SCIATICA PRESENCE UNSPECIFIED: Primary | ICD-10-CM

## 2019-05-02 DIAGNOSIS — R20.2 RIGHT LEG PARESTHESIAS: ICD-10-CM

## 2019-05-02 PROCEDURE — 3008F BODY MASS INDEX DOCD: CPT | Mod: CPTII,S$GLB,, | Performed by: PSYCHIATRY & NEUROLOGY

## 2019-05-02 PROCEDURE — 99203 OFFICE O/P NEW LOW 30 MIN: CPT | Mod: S$GLB,,, | Performed by: PSYCHIATRY & NEUROLOGY

## 2019-05-02 PROCEDURE — 99999 PR PBB SHADOW E&M-EST. PATIENT-LVL IV: ICD-10-PCS | Mod: PBBFAC,,, | Performed by: PSYCHIATRY & NEUROLOGY

## 2019-05-02 PROCEDURE — 99999 PR PBB SHADOW E&M-EST. PATIENT-LVL IV: CPT | Mod: PBBFAC,,, | Performed by: PSYCHIATRY & NEUROLOGY

## 2019-05-02 PROCEDURE — 99203 PR OFFICE/OUTPT VISIT, NEW, LEVL III, 30-44 MIN: ICD-10-PCS | Mod: S$GLB,,, | Performed by: PSYCHIATRY & NEUROLOGY

## 2019-05-02 PROCEDURE — 3008F PR BODY MASS INDEX (BMI) DOCUMENTED: ICD-10-PCS | Mod: CPTII,S$GLB,, | Performed by: PSYCHIATRY & NEUROLOGY

## 2019-05-02 NOTE — LETTER
May 2, 2019      Heena Castaneda MD  [address]  [address]  [facility] [address]           [facility]  [address] [facility] [address]  Phone: [phone]  Fax: [phone]          Patient: Desiree Salas   MR Number: [id number]   YOB: 1975   Date of Visit: 5/2/2019       Dear Dr. Heena Castaneda:    Thank you for referring Desiree Salas to me for evaluation. Attached you will find relevant portions of my assessment and plan of care.    If you have questions, please do not hesitate to call me. I look forward to following Desiree Salas along with you.    Sincerely,    Des Jurado MD    Enclosure  CC:  No Recipients    If you would like to receive this communication electronically, please contact [email or web address] or [phone] to request more information on [employer] access.    For providers and/or their staff who would like to refer a patient to [facility], please contact us through our one-stop-shop provider referral line, [facility], at [phone].    If you feel you have received this communication in error or would no longer like to receive these types of communications, please e-mail [email or web address]

## 2019-05-02 NOTE — PROGRESS NOTES
Neurology Clinic Visit  Primary Care Provider: Tiffanie Lemos MD  Referring Provider: Dr. Heena Castaneda  Date of Visit: 05/02/2019       chief complaint:   Chief Complaint   Patient presents with    Numbness     right thigh       History of present illness  Desiree Salas is a 43 y.o. right handed female I have been asked to consult for evaluation of right thigh numbness. She noticed it a few days after having fibroid removed. The surgery on was 3/15/2019. She reports the numbness/tingling is on the right lateral portion of thigh that sometimes ache. It does not cross the knee. She does have mild lower back pain for the past few years. There are no radiating features to her lower back pain. No weakness in the legs. She Does not have to take medication for the symptoms.        Patient Active Problem List    Diagnosis Date Noted    S/P myomectomy 03/16/2019    Ganglion cyst of wrist, left 12/18/2018    Anxiety disorder 11/15/2018    Depression 11/15/2018    Attention deficit hyperactivity disorder (ADHD), predominantly inattentive type 11/15/2018     Past Medical History:   Diagnosis Date    ADHD (attention deficit hyperactivity disorder), inattentive type     Anxiety     Asthma     exercise induced; no inhaler use    Depression      Past Surgical History:   Procedure Laterality Date    CYST REMOVAL  2016    groin area    DILATION AND CURETTAGE OF UTERUS  1998    due to heavy/irregular menses    EXCISION, GANGLION CYST, WRIST LEFT Left 12/18/2018    [facility]    MYOMECTOMY OPEN N/A 3/15/2019    Performed by [facility] Castaneda MD at [facility] OR     Family History   Problem Relation Age of Onset    Alzheimer's disease Maternal Grandmother     Diabetes Father     Hypertension Father     Diabetes Mother     Cancer Brother         unsure of the type    Migraines Sister          Current Outpatient Medications   Medication Sig    ARIPiprazole (ABILIFY) 10  MG Tab once daily.     docusate sodium (COLACE) 100 MG capsule Take 1 capsule (100 mg total) by mouth 2 (two) times daily.    FLUoxetine (PROZAC) 10 MG capsule TK ONE C PO  DAILY ON DAYS 14-28 OF CYCLE PLUS FIRST DAY OF MENSTRUATION    FLUoxetine (PROZAC) 40 MG capsule Take 80 mg by mouth once daily.    glucosamine-chondroitin 500-400 mg tablet Take 1 tablet by mouth once daily.     ibuprofen (ADVIL,MOTRIN) 600 MG tablet Take 1 tablet (600 mg total) by mouth every 6 (six) hours.    levomefolate calcium (DEPLIN ORAL) Take 15 mg by mouth once daily.    oxyCODONE-acetaminophen (PERCOCET) 5-325 mg per tablet Take 1 tablet by mouth every 4 (four) hours as needed for Pain.    PNV no.95/ferrous fum/folic ac (PRENATAL MULTIVITAMINS ORAL) Take 1 tablet by mouth once daily.    REXULTI 1 mg Tab TK 1 T PO D FOR 1 WEEK. INCREASE TO 2 T PO D    VYVANSE 50 mg capsule every morning.      No current facility-administered medications for this visit.          Review of patient's allergies indicates:  No Known Allergies  Social History     Socioeconomic History    Marital status: Single     Spouse name: Not on file    Number of children: Not on file    Years of education: Not on file    Highest education level: Not on file   Occupational History     Comment: Works at [employer] -    Social Needs    Financial resource strain: Not on file    Food insecurity:     Worry: Not on file     Inability: Not on file    Transportation needs:     Medical: Not on file     Non-medical: Not on file   Tobacco Use    Smoking status: Current Some Day Smoker     Years: 2.00     Types: Cigarettes     Last attempt to quit: 2017     Years since quittin.8    Smokeless tobacco: Never Used    Tobacco comment: 1 pack in a week   Substance and Sexual Activity    Alcohol use: Yes     Frequency: 4 or more times a week     Drinks per session: 1 or 2     Comment: daily, 2oz daily     Drug use: Yes     Types: Marijuana      "Comment: daily    Sexual activity: Yes     Partners: Male     Birth control/protection: Condom, None   Lifestyle    Physical activity:     Days per week: Not on file     Minutes per session: Not on file    Stress: Not on file   Relationships    Social connections:     Talks on phone: Not on file     Gets together: Not on file     Attends Restorationism service: Not on file     Active member of club or organization: Not on file     Attends meetings of clubs or organizations: Not on file     Relationship status: Not on file   Other Topics Concern    Not on file   Social History Narrative    Not on file       Review of Systems  Constitutional: negative  Eyes: negative  Ears, nose, mouth, throat, and face: negative  Respiratory: negative  Cardiovascular: negative  Gastrointestinal: negative  Genitourinary:negative  Integument/breast: negative  Hematologic/lymphatic: negative  Musculoskeletal:negative  Neurological: negative  Behavioral/Psych: negative  Endocrine: negative  Allergic/Immunologic: negative    Objective:  Vital signs in last 24 hours:    Vitals:    05/02/19 0811   BP: 106/65   Pulse: 70   Weight: 88.9 kg (195 lb 15.8 oz)   Height: 5' 4" (1.626 m)     General: no acute distress, well nourished, well-groomed  CVS: RRR, no murmur, gallops or rubs  Respiratory: Clear to ausculation  Extremities: no edema    Neurological Examination:    HIGHER INTEGRATIVE FUNCTIONS:  -Normal attention span and concentration; immediately responds to questions and commands  -Oriented to time, place and person  -Recent and remote memory intact  -Language normal (no aphasia or dysarthria)  -Normal fund of knowledge    CN:  -PERRLA, visual fields full, unable to visualize optic discs due to small pupils on fundus exam   -EOMI with normal saccades and smooth pursuit  -Facial sensation intact bilaterally  -Facial strength/movement intact bilaterally  -Hearing intact to voice  -Palate elevates symmetrically  -Normal shoulder shrug and " head turn  -Tongue protrudes midline    MOTOR: (left/right graded 1-5)  -UE: 5/5 deltoids; 5/5 biceps, triceps; 5/5 wrist flexors, extensors; 5/5 interosseous; 5/5   -LEs: 5/5 hip flexion, extension; 5/5 knee flexion, extension; 5/5 ankle flexion, extension  -Tone: normal  -No pronator drift, no orbiting    SENSORY:  -Light touch, temperature sensation intact bilaterally; decrease pinprick in right lateral thigh    REFLEXES:  -2+ upper and lower bilaterally  -Flexor plantar reflex bilaterally  -No clonus    COORDINATION:  -FNF, HKS, MYNOR intact bilaterally    GAIT:  -Normal casual gait with intact tandem      Assessment/Plan:    1. Right thigh paresthesia, suspect right meralgia paresthetica; will get EMG/NCS for investigation of lumbar radiculopathy in setting of lower back pain  2. Chronic lower back pain    Plan:  EMG/NCS  MRI Lumbar Spine  I discussed with the patient to avoid wearing tight-fitting clothes around the waist.  Patient will follow up after tests are complete or sooner if needed.        I discussed assessment and plan with the patient and answered the questions that she had    Patient note was created using Dragon Dictation.  Any errors in syntax or even information may not have been identified and edited on initial review prior to signing this note.

## 2019-05-08 ENCOUNTER — TELEPHONE (OUTPATIENT)
Dept: NEUROLOGY | Facility: CLINIC | Age: 44
End: 2019-05-08

## 2019-05-08 NOTE — TELEPHONE ENCOUNTER
I called patient to inform her that her insurance company denied her MRI. There was no answer and I left a message

## 2019-05-10 ENCOUNTER — OFFICE VISIT (OUTPATIENT)
Dept: OBSTETRICS AND GYNECOLOGY | Facility: CLINIC | Age: 44
End: 2019-05-10
Attending: OBSTETRICS & GYNECOLOGY
Payer: COMMERCIAL

## 2019-05-10 VITALS
WEIGHT: 197.06 LBS | SYSTOLIC BLOOD PRESSURE: 120 MMHG | BODY MASS INDEX: 33.64 KG/M2 | DIASTOLIC BLOOD PRESSURE: 72 MMHG | HEIGHT: 64 IN

## 2019-05-10 DIAGNOSIS — Z98.890 POST-OPERATIVE STATE: Primary | ICD-10-CM

## 2019-05-10 PROCEDURE — 99999 PR PBB SHADOW E&M-EST. PATIENT-LVL III: ICD-10-PCS | Mod: PBBFAC,,, | Performed by: OBSTETRICS & GYNECOLOGY

## 2019-05-10 PROCEDURE — 99024 PR POST-OP FOLLOW-UP VISIT: ICD-10-PCS | Mod: S$GLB,,, | Performed by: OBSTETRICS & GYNECOLOGY

## 2019-05-10 PROCEDURE — 99999 PR PBB SHADOW E&M-EST. PATIENT-LVL III: CPT | Mod: PBBFAC,,, | Performed by: OBSTETRICS & GYNECOLOGY

## 2019-05-10 PROCEDURE — 99024 POSTOP FOLLOW-UP VISIT: CPT | Mod: S$GLB,,, | Performed by: OBSTETRICS & GYNECOLOGY

## 2019-05-13 NOTE — PROGRESS NOTES
"                                                             Post op note       Subjective:       Desiree Salas is a 44 y.o. [id number] presents to the clinic 6 weeks status post open myomectomy for fibroids. Eating a regular diet without difficulty. Bowel movements are normal. The patient is not having any pain.  Right anterior thigh decreased sensation no pain or movement issues.  Mild improvement since after surgery.  Has seen neurology who has her booked for a nerve conduction test in August.  Patient ok with plan.  Discussed this might need several months for resolution.  Patient was supine in surgery.  No complication.  Low transverse incision  The patient's allergies, and past medical and surgical histories were reviewed.    Review of Systems  Pertinent items are noted in HPI.      Objective:      /72   Ht 5' 4" (1.626 m)   Wt 89.4 kg (197 lb 1.5 oz)   LMP 2019   BMI 33.83 kg/m²   General:  alert, appears stated age and cooperative   Abdomen: soft, bowel sounds active, non-tender   Incision:       healing well, no drainage, no erythema, no hernia, no seroma, no swelling, no dehiscence, incision well approximated      right thigh with anterior decreased sensation  No pain  Pathology:  benign       Assessment:      Doing well postoperatively.  Operative findings again reviewed. Pathology report discussed.      Plan:      1. Continue any current medications.  2. Wound care discussed.  3. Activity restrictions: none  4. Anticipated return to work: now.  5. Follow up:as needed   Return to dr schumacher for fertility planning   Will continue to watch thigh sensation and call if anything worsens.    "

## 2019-07-09 ENCOUNTER — PATIENT MESSAGE (OUTPATIENT)
Dept: OBSTETRICS AND GYNECOLOGY | Facility: CLINIC | Age: 44
End: 2019-07-09

## 2019-08-02 ENCOUNTER — LAB VISIT (OUTPATIENT)
Dept: LAB | Facility: OTHER | Age: 44
End: 2019-08-02
Payer: COMMERCIAL

## 2019-08-02 ENCOUNTER — OFFICE VISIT (OUTPATIENT)
Dept: OBSTETRICS AND GYNECOLOGY | Facility: CLINIC | Age: 44
End: 2019-08-02
Payer: COMMERCIAL

## 2019-08-02 VITALS
SYSTOLIC BLOOD PRESSURE: 110 MMHG | BODY MASS INDEX: 33.74 KG/M2 | HEIGHT: 64 IN | DIASTOLIC BLOOD PRESSURE: 60 MMHG | WEIGHT: 197.63 LBS

## 2019-08-02 DIAGNOSIS — Z11.3 SCREEN FOR STD (SEXUALLY TRANSMITTED DISEASE): Primary | ICD-10-CM

## 2019-08-02 DIAGNOSIS — Z11.3 SCREEN FOR STD (SEXUALLY TRANSMITTED DISEASE): ICD-10-CM

## 2019-08-02 PROCEDURE — 87801 DETECT AGNT MULT DNA AMPLI: CPT

## 2019-08-02 PROCEDURE — 99999 PR PBB SHADOW E&M-EST. PATIENT-LVL III: CPT | Mod: PBBFAC,,, | Performed by: NURSE PRACTITIONER

## 2019-08-02 PROCEDURE — 86592 SYPHILIS TEST NON-TREP QUAL: CPT

## 2019-08-02 PROCEDURE — 99213 PR OFFICE/OUTPT VISIT, EST, LEVL III, 20-29 MIN: ICD-10-PCS | Mod: S$GLB,,, | Performed by: NURSE PRACTITIONER

## 2019-08-02 PROCEDURE — 3008F BODY MASS INDEX DOCD: CPT | Mod: CPTII,S$GLB,, | Performed by: NURSE PRACTITIONER

## 2019-08-02 PROCEDURE — 36415 COLL VENOUS BLD VENIPUNCTURE: CPT

## 2019-08-02 PROCEDURE — 86703 HIV-1/HIV-2 1 RESULT ANTBDY: CPT

## 2019-08-02 PROCEDURE — 87661 TRICHOMONAS VAGINALIS AMPLIF: CPT

## 2019-08-02 PROCEDURE — 87340 HEPATITIS B SURFACE AG IA: CPT

## 2019-08-02 PROCEDURE — 3008F PR BODY MASS INDEX (BMI) DOCUMENTED: ICD-10-PCS | Mod: CPTII,S$GLB,, | Performed by: NURSE PRACTITIONER

## 2019-08-02 PROCEDURE — 99999 PR PBB SHADOW E&M-EST. PATIENT-LVL III: ICD-10-PCS | Mod: PBBFAC,,, | Performed by: NURSE PRACTITIONER

## 2019-08-02 PROCEDURE — 87481 CANDIDA DNA AMP PROBE: CPT | Mod: 59

## 2019-08-02 PROCEDURE — 87491 CHLMYD TRACH DNA AMP PROBE: CPT

## 2019-08-02 PROCEDURE — 99213 OFFICE O/P EST LOW 20 MIN: CPT | Mod: S$GLB,,, | Performed by: NURSE PRACTITIONER

## 2019-08-02 RX ORDER — ARIPIPRAZOLE 5 MG/1
TABLET ORAL
Refills: 2 | COMMUNITY
Start: 2019-07-07 | End: 2020-09-23

## 2019-08-02 NOTE — PROGRESS NOTES
Reason for Visit today:   STD testing      (Dr. Castaneda patient)    Last Pap:  2018       Patient ID: Desiree Salas is a 44 y.o. female.    Chief Complaint:  std screening ( Dr Castaneda last pap 10/18 neg hpv neg  mammo )      History of Present Illness.  Desiree Salas is a 44 y.o. female who presents today for STD screening.  She recently had intercourse with her partner and the condom broke.    She has no breast or urinary symptoms.  She has no menorrhagia, oligomenorrhea, bleeding betweeen menses, postcoital bleeding, dysmenorrhea, pelvic pain, dyspareunia, vaginal dryness, vaginal discharge and sexual complaints  Ms. Salas is currently sexually active with a single male partner.  She would like STD screening today.      GYN & OB History  Patient's last menstrual period was 2019 (exact date).       OB History    Para Term  AB Living   1 0 0 0 1 0   SAB TAB Ectopic Multiple Live Births   1 0 0 0 0      # Outcome Date GA Lbr Brent/2nd Weight Sex Delivery Anes PTL Lv   1 SAB                Past Medical History:   Diagnosis Date    ADHD (attention deficit hyperactivity disorder), inattentive type     Anxiety     Asthma     exercise induced; no inhaler use    Depression      Past Surgical History:   Procedure Laterality Date    CYST REMOVAL      groin area    DILATION AND CURETTAGE OF UTERUS      due to heavy/irregular menses    EXCISION, GANGLION CYST, WRIST LEFT Left 2018    Performed by Walter Joyner Jr., [facility] at [facility] OR    MYOMECTOMY OPEN N/A 3/15/2019    Performed by Heena Castaneda MD at [facility] OR     Family History   Problem Relation Age of Onset    Alzheimer's disease Maternal Grandmother     Diabetes Father     Hypertension Father     Diabetes Mother     Cancer Brother         unsure of the type    Migraines Sister      Social History     Tobacco Use    Smoking status: Current Some Day Smoker     Years: 2.00     Types: Cigarettes     Last  "attempt to quit: 2017     Years since quittin.0    Smokeless tobacco: Never Used    Tobacco comment: 1 pack in a week   Substance Use Topics    Alcohol use: Yes     Frequency: 4 or more times a week     Drinks per session: 1 or 2     Comment: daily, 2oz daily     Drug use: Yes     Types: Marijuana     Comment: daily       Current Outpatient Medications:     ARIPiprazole (ABILIFY) 5 MG Tab, TK 1 T PO D, Disp: , Rfl: 2    FLUoxetine (PROZAC) 10 MG capsule, TK ONE C PO  DAILY ON DAYS 14-28 OF CYCLE PLUS FIRST DAY OF MENSTRUATION, Disp: , Rfl: 2    FLUoxetine (PROZAC) 40 MG capsule, Take 80 mg by mouth once daily., Disp: , Rfl:     glucosamine-chondroitin 500-400 mg tablet, Take 1 tablet by mouth once daily. , Disp: , Rfl:     levomefolate calcium (DEPLIN ORAL), Take 15 mg by mouth once daily., Disp: , Rfl:     PNV no.95/ferrous fum/folic ac (PRENATAL MULTIVITAMINS ORAL), Take 1 tablet by mouth once daily., Disp: , Rfl:     VYVANSE 50 mg capsule, every morning. , Disp: , Rfl: 0    Review of patient's allergies indicates:  No Known Allergies    Review of Systems  GENERAL: No fever, chills, fatigue or weight loss.  VULVAR: No pain, no lesions and no itching.  VAGINAL: No relaxation, no itching, no abnormal bleeding and no lesions.  ABDOMEN: No abdominal pain. Denies nausea. Denies vomiting. No diarrhea. No constipation  BREAST: Denies pain. No lumps. No discharge.  URINARY: No incontinence, no nocturia, no frequency and no dysuria.  CARDIOVASCULAR: No chest pain. No shortness of breath. No leg cramps.  NEUROLOGICAL: No headaches. No vision changes.    Vitals:    19 0913   BP: 110/60   Weight: 89.7 kg (197 lb 10.3 oz)   Height: 5' 4" (1.626 m)   PainSc: 0-No pain     Body mass index is 33.93 kg/m².    PHYSICAL EXAM:   General: No acute distress; alert and engaged.  Abdomen: Soft, non-tender, non-distended.  External genitalia: normal external genitalia, no erythema, no discharge  and urethra: " normal appearing urethra with no masses, tenderness or lesions.   Vagina: normal appearing vagina with normal color and discharge, no lesions, DNA probe for chlamydia and GC obtained.    Cervix: normal appearing cervix without discharge or lesions, DNA probe for chlamydia and GC obtained, cervical motion tenderness absent.   Uterus: uterus is normal size, shape, consistency and nontender, mobile.   Adnexa:  normal adnexa and no mass, fullness, tenderness .    Assessment/ Plan:     Screen for STD (sexually transmitted disease)  -     C. trachomatis/N. gonorrhoeae by AMP DNA  -     RPR; Future  -     HIV 1/2 Ag/Ab (4th Gen); Future  -     Hepatitis B surface antigen; Future; Expected date: 08/02/2019  -     Vaginosis Screen by DNA Probe    Contraception and STD testing discussed.  Will notify patient of results when finalized.  Safe sex practices & STD education given with good verbal understanding.    Use of the [facility] Patient Portal discussed and encouraged during today's visit.     Follow up if symptoms worsen or fail to improve.    * Patient aware that she will be notified once her finalized results have been reviewed by her Provider, either via her [facility] patient portal, or via telephone call.

## 2019-08-03 LAB
BACTERIAL VAGINOSIS DNA: POSITIVE
C TRACH DNA SPEC QL NAA+PROBE: NOT DETECTED
CANDIDA GLABRATA DNA: NEGATIVE
CANDIDA KRUSEI DNA: NEGATIVE
CANDIDA RRNA VAG QL PROBE: NEGATIVE
N GONORRHOEA DNA SPEC QL NAA+PROBE: NOT DETECTED
T VAGINALIS RRNA GENITAL QL PROBE: NEGATIVE

## 2019-08-05 DIAGNOSIS — N76.0 BV (BACTERIAL VAGINOSIS): Primary | ICD-10-CM

## 2019-08-05 DIAGNOSIS — B96.89 BV (BACTERIAL VAGINOSIS): Primary | ICD-10-CM

## 2019-08-05 LAB
HBV SURFACE AG SERPL QL IA: NEGATIVE
HIV 1+2 AB+HIV1 P24 AG SERPL QL IA: NEGATIVE
RPR SER QL: NORMAL

## 2019-08-05 RX ORDER — TINIDAZOLE 500 MG/1
TABLET ORAL
Qty: 8 TABLET | Refills: 0 | Status: SHIPPED | OUTPATIENT
Start: 2019-08-05 | End: 2020-09-23

## 2019-08-05 NOTE — PROGRESS NOTES
"Hi Marlecia,   Your vaginal swab came back (+) for Bacterial Vaginosis, which is an infection caused when too much of certain bacteria change the normal balance of bacteria in the vagina.  I have sent in medication for you to take to treat this.  Remember you cannot drink alcohol while taking medication or for 3 days after completion of medication.  Also, the remainder of your vaginal swabs - including Gonorrhea, Chlamydia, Trichomonas and Yeast - were all negative!  So that's good news!   Your blood work is still "in-process".    ~ BRENDON Patton  "

## 2019-08-08 ENCOUNTER — PROCEDURE VISIT (OUTPATIENT)
Dept: NEUROLOGY | Facility: CLINIC | Age: 44
End: 2019-08-08
Payer: COMMERCIAL

## 2019-08-08 DIAGNOSIS — R20.2 RIGHT LEG PARESTHESIAS: ICD-10-CM

## 2019-08-08 PROCEDURE — 95908 NRV CNDJ TST 3-4 STUDIES: CPT | Mod: S$GLB,,, | Performed by: NEUROMUSCULOSKELETAL MEDICINE & OMM

## 2019-08-08 PROCEDURE — 95886 MUSC TEST DONE W/N TEST COMP: CPT | Mod: S$GLB,,, | Performed by: NEUROMUSCULOSKELETAL MEDICINE & OMM

## 2019-08-08 PROCEDURE — 95908 PR NERVE CONDUCTION STUDY; 3-4 STUDIES: ICD-10-PCS | Mod: S$GLB,,, | Performed by: NEUROMUSCULOSKELETAL MEDICINE & OMM

## 2019-08-08 PROCEDURE — 95886 PR EMG COMPLETE, W/ NERVE CONDUCTION STUDIES, 5+ MUSCLES: ICD-10-PCS | Mod: S$GLB,,, | Performed by: NEUROMUSCULOSKELETAL MEDICINE & OMM

## 2019-11-20 NOTE — PROGRESS NOTES
Subjective:       Patient ID: Desiree Salas is a 44 y.o. female.    Chief Complaint: Annual Exam    HPI  Desiree Salas is a 44 y.o. year old female with depression, ADHD, anxiety, marijuana smoker, obesity who presents today for an annual exam.    Anxiety, depression, ADHD (adult-onset, inattentive) - symptoms controlled. Sees psychiatry and psychology ([facility] - Dr. Marie). Takes Prozac 80mg daily and an extra 10mg around menstrual cycle. Also taking Abilify and Adderall. Deplin vitamins as well.  She reports still feeling depressed.  Denies suicidal ideation.    She is interested in fertility treatments - may start this soon.     Obesity - patient is interested in losing weight and would like to speak with a nutritionist.    OB/GYN History    (miscarriage at age 16)  LMP: 11/15/19  Sexually active: yes  Contraception: none    Health Maintenance  Pap smear: 10/26/18 - normal cytology, negative high risk HPV  Mammogram: 19 - done at [facility]. Abnormal left breast s/p biopsy - benign   Colon Cancer Screening: n/a  DEXA: n/a  Hepatitis C screening: n/a  Flu vaccine: thinking about it  Tetanus vaccine: Tetanus vaccine in 2014 per patient? No records available.  PNA vaccine: n/a  Shingles vaccine: n/a    I personally reviewed Past Medical History, Past Surgical History, Social History, and Family History    Review of Systems   Constitutional: Negative for chills, fatigue and fever.   HENT: Negative for congestion, hearing loss and rhinorrhea.    Eyes: Negative for visual disturbance.   Respiratory: Negative for cough, shortness of breath and wheezing.    Cardiovascular: Negative for chest pain.   Gastrointestinal: Negative for abdominal pain, constipation, diarrhea, nausea and vomiting.   Skin: Negative for rash.   Neurological: Negative for dizziness, syncope and headaches.   Psychiatric/Behavioral: Negative for dysphoric mood and sleep disturbance. The patient is not  "nervous/anxious.        Objective:      Vitals:    11/21/19 0817   BP: 118/66   Pulse: 70   SpO2: 98%   Weight: 91.5 kg (201 lb 11.5 oz)   Height: 5' 4" (1.626 m)     Physical Exam   Constitutional: She is oriented to person, place, and time. She appears well-developed. No distress.   HENT:   Head: Normocephalic and atraumatic.   Right Ear: Hearing normal.   Left Ear: Hearing normal.   Nose: Nose normal.   Mouth/Throat: Oropharynx is clear and moist and mucous membranes are normal. No oropharyngeal exudate.   Eyes: Pupils are equal, round, and reactive to light. Conjunctivae and lids are normal.   Neck: Normal range of motion. No thyroid mass and no thyromegaly present.   Cardiovascular: Normal rate, regular rhythm, S1 normal, S2 normal and intact distal pulses.   No murmur heard.  No lower extremity edema.    Pulmonary/Chest: Effort normal and breath sounds normal. No respiratory distress.   Abdominal: Soft. Normal appearance and bowel sounds are normal. There is no tenderness.   Lymphadenopathy:     She has no cervical adenopathy.        Right: No supraclavicular adenopathy present.        Left: No supraclavicular adenopathy present.   Neurological: She is alert and oriented to person, place, and time.   Skin: Skin is warm and dry. No rash noted.   Psychiatric: She has a normal mood and affect. Her behavior is normal. Thought content normal.   Nursing note and vitals reviewed.      Assessment:       1. Annual physical exam    2. Screening for lipid disorders    3. Screening for diabetes mellitus    4. Class 1 obesity due to excess calories without serious comorbidity with body mass index (BMI) of 34.0 to 34.9 in adult        Plan:     Annual physical exam  Recommended patient have flu vaccine.  Recommended cessation of marijuana smoking.  -     CBC auto differential; Future; Expected date: 11/21/2019  -     Comprehensive metabolic panel; Future; Expected date: 11/21/2019  -     Lipid panel; Future; Expected date: " 11/21/2019  -     TSH; Future; Expected date: 11/21/2019    Screening for lipid disorders  -     Lipid panel; Future; Expected date: 11/21/2019    Screening for diabetes mellitus  -     Comprehensive metabolic panel; Future; Expected date: 11/21/2019    Class 1 obesity due to excess calories without serious comorbidity with body mass index (BMI) of 34.0 to 34.9 in adult  -     Ambulatory Referral to Nutrition Services

## 2019-11-21 ENCOUNTER — OFFICE VISIT (OUTPATIENT)
Dept: INTERNAL MEDICINE | Facility: CLINIC | Age: 44
End: 2019-11-21
Payer: COMMERCIAL

## 2019-11-21 VITALS
HEIGHT: 64 IN | HEART RATE: 70 BPM | OXYGEN SATURATION: 98 % | SYSTOLIC BLOOD PRESSURE: 118 MMHG | DIASTOLIC BLOOD PRESSURE: 66 MMHG | WEIGHT: 201.75 LBS | BODY MASS INDEX: 34.44 KG/M2

## 2019-11-21 DIAGNOSIS — E66.09 CLASS 1 OBESITY DUE TO EXCESS CALORIES WITHOUT SERIOUS COMORBIDITY WITH BODY MASS INDEX (BMI) OF 34.0 TO 34.9 IN ADULT: ICD-10-CM

## 2019-11-21 DIAGNOSIS — Z13.1 SCREENING FOR DIABETES MELLITUS: ICD-10-CM

## 2019-11-21 DIAGNOSIS — Z13.220 SCREENING FOR LIPID DISORDERS: ICD-10-CM

## 2019-11-21 DIAGNOSIS — Z00.00 ANNUAL PHYSICAL EXAM: Primary | ICD-10-CM

## 2019-11-21 PROCEDURE — 99999 PR PBB SHADOW E&M-EST. PATIENT-LVL III: CPT | Mod: PBBFAC,,, | Performed by: FAMILY MEDICINE

## 2019-11-21 PROCEDURE — 99396 PR PREVENTIVE VISIT,EST,40-64: ICD-10-PCS | Mod: S$GLB,,, | Performed by: FAMILY MEDICINE

## 2019-11-21 PROCEDURE — 99396 PREV VISIT EST AGE 40-64: CPT | Mod: S$GLB,,, | Performed by: FAMILY MEDICINE

## 2019-11-21 PROCEDURE — 99999 PR PBB SHADOW E&M-EST. PATIENT-LVL III: ICD-10-PCS | Mod: PBBFAC,,, | Performed by: FAMILY MEDICINE

## 2019-11-23 NOTE — PROGRESS NOTES
Yue Bermeo, CCC-A  Audiologist - [facility] [address] [place] [address]  [email or web address]  [phone]    Patient: Desiree Salsa   MRN: [id number]  [address] [facility]  Home Phone [phone]   Work Phone Not on file.   Mobile [phone]   : 1975  DIANA: 1/15/2019      HEARING AID CONSULT      Hearing aid prices and styles were discussed with Desiree Salas at length.  She would like to consider her financial options with her insurance and will call the clinic if she decides to place an order.      _______________________________  Yue Bermeo, JHONATHAN-A  Audiologist

## 2019-11-24 PROBLEM — E66.811 CLASS 1 OBESITY DUE TO EXCESS CALORIES WITHOUT SERIOUS COMORBIDITY WITH BODY MASS INDEX (BMI) OF 34.0 TO 34.9 IN ADULT: Status: ACTIVE | Noted: 2019-11-24

## 2019-11-24 PROBLEM — E66.09 CLASS 1 OBESITY DUE TO EXCESS CALORIES WITHOUT SERIOUS COMORBIDITY WITH BODY MASS INDEX (BMI) OF 34.0 TO 34.9 IN ADULT: Status: ACTIVE | Noted: 2019-11-24

## 2019-12-19 ENCOUNTER — HOSPITAL ENCOUNTER (OUTPATIENT)
Dept: DIABETES | Facility: OTHER | Age: 44
Discharge: HOME | End: 2019-12-19
Attending: FAMILY MEDICINE
Payer: COMMERCIAL

## 2019-12-19 VITALS — BODY MASS INDEX: 34.1 KG/M2 | WEIGHT: 199.75 LBS | HEIGHT: 64 IN

## 2019-12-19 DIAGNOSIS — E66.09 CLASS 1 OBESITY DUE TO EXCESS CALORIES WITHOUT SERIOUS COMORBIDITY WITH BODY MASS INDEX (BMI) OF 34.0 TO 34.9 IN ADULT: Primary | ICD-10-CM

## 2019-12-19 NOTE — PROGRESS NOTES
"Patient here for weight loss counseling. Discussed importance of lifestyle changes: diet and exercise. Throughout appointment patient flat and not very engaged in conversation. Difficulty verbalizing what she desired to get out of nutrition appointment. Patient states she won't exercise. Is not willing to explore alternatives to help increase activity levels like counting steps, parking further away etc. Began introduction to balanced nutrition using the plate method. Patient does not grocery shop. Lives with mother and sister and they shop and cook. Patient did not seem willing to discuss with them desire to make diety modifications. Patient also not willing to make changes to portion sizes of starchy foods. Patient became tearful during discussion and when asked about what was most upsetting, she states "It's never going to happen." Encouraged patient to think through materials and focus on 1 or 2 del changes to incorporate into routine to help create healthier habits.   "

## 2020-02-13 ENCOUNTER — TELEPHONE (OUTPATIENT)
Dept: OBSTETRICS AND GYNECOLOGY | Facility: CLINIC | Age: 45
End: 2020-02-13

## 2020-02-13 NOTE — TELEPHONE ENCOUNTER
Dr Castaneda pt calling, she needs to know if there was dye injected into her tubes during her Myomectomy surgery.Pt # [phone]

## 2020-02-17 ENCOUNTER — TELEPHONE (OUTPATIENT)
Dept: OBSTETRICS AND GYNECOLOGY | Facility: CLINIC | Age: 45
End: 2020-02-17

## 2020-02-21 ENCOUNTER — TELEPHONE (OUTPATIENT)
Dept: OBSTETRICS AND GYNECOLOGY | Facility: CLINIC | Age: 45
End: 2020-02-21

## 2020-02-21 DIAGNOSIS — Z12.31 ENCOUNTER FOR SCREENING MAMMOGRAM FOR BREAST CANCER: Primary | ICD-10-CM

## 2020-09-05 ENCOUNTER — PATIENT MESSAGE (OUTPATIENT)
Dept: OBSTETRICS AND GYNECOLOGY | Facility: CLINIC | Age: 45
End: 2020-09-05

## 2020-09-05 DIAGNOSIS — D21.9 FIBROIDS: Primary | ICD-10-CM

## 2020-09-10 ENCOUNTER — TELEPHONE (OUTPATIENT)
Dept: OBSTETRICS AND GYNECOLOGY | Facility: CLINIC | Age: 45
End: 2020-09-10

## 2020-09-22 ENCOUNTER — PATIENT OUTREACH (OUTPATIENT)
Dept: ADMINISTRATIVE | Facility: OTHER | Age: 45
End: 2020-09-22

## 2020-09-22 NOTE — PROGRESS NOTES
Care Everywhere: updated   Immunization: updated  Health Maintenance: updated  Media Review: uploaded 2020 mammogram report   Legacy Review:   Order placed:   Upcoming appts:

## 2020-09-23 ENCOUNTER — OFFICE VISIT (OUTPATIENT)
Dept: OBSTETRICS AND GYNECOLOGY | Facility: CLINIC | Age: 45
End: 2020-09-23
Attending: OBSTETRICS & GYNECOLOGY
Payer: COMMERCIAL

## 2020-09-23 VITALS
DIASTOLIC BLOOD PRESSURE: 70 MMHG | HEIGHT: 64 IN | SYSTOLIC BLOOD PRESSURE: 120 MMHG | WEIGHT: 199 LBS | BODY MASS INDEX: 33.97 KG/M2

## 2020-09-23 DIAGNOSIS — R53.83 FATIGUE, UNSPECIFIED TYPE: ICD-10-CM

## 2020-09-23 DIAGNOSIS — F32.A DEPRESSION, UNSPECIFIED DEPRESSION TYPE: Primary | ICD-10-CM

## 2020-09-23 DIAGNOSIS — R10.2 PELVIC PAIN: ICD-10-CM

## 2020-09-23 PROCEDURE — 99213 PR OFFICE/OUTPT VISIT, EST, LEVL III, 20-29 MIN: ICD-10-PCS | Mod: S$GLB,,, | Performed by: OBSTETRICS & GYNECOLOGY

## 2020-09-23 PROCEDURE — 99999 PR PBB SHADOW E&M-EST. PATIENT-LVL IV: CPT | Mod: PBBFAC,,, | Performed by: OBSTETRICS & GYNECOLOGY

## 2020-09-23 PROCEDURE — 99213 OFFICE O/P EST LOW 20 MIN: CPT | Mod: S$GLB,,, | Performed by: OBSTETRICS & GYNECOLOGY

## 2020-09-23 PROCEDURE — 99999 PR PBB SHADOW E&M-EST. PATIENT-LVL IV: ICD-10-PCS | Mod: PBBFAC,,, | Performed by: OBSTETRICS & GYNECOLOGY

## 2020-09-23 PROCEDURE — 3008F BODY MASS INDEX DOCD: CPT | Mod: CPTII,S$GLB,, | Performed by: OBSTETRICS & GYNECOLOGY

## 2020-09-23 PROCEDURE — 3008F PR BODY MASS INDEX (BMI) DOCUMENTED: ICD-10-PCS | Mod: CPTII,S$GLB,, | Performed by: OBSTETRICS & GYNECOLOGY

## 2020-09-23 NOTE — PROGRESS NOTES
Subjective:       Patient ID: Desiree Salas is a 45 y.o. female.    Chief Complaint:  gyn ultrasound      History of Present Illness  45 year old here for left lower pelvic discomfort.  Ultrasound reviewed and it is normal.  Discussed home life and stressors and patient having a hard time.  Discussed seeing psychiatry as she is on different medication now than in the past.  Feels depressed and is not taking care of herself right now.  Discussed amount of alcohol.  Discussed AA.  Recently tried to achieve pregnancy with fertility and this was not successful.  Reports diarrhea daily.  No vaginal bleeding and reports a regular period.  No current pain Pain feel deep in pelvic area.  Discussed a pain journal   Recent period no current bleeding      GYN & OB History  Patient's last menstrual period was 2020.   Date of Last Pap: 2018    OB History    Para Term  AB Living   1 0 0 0 1 0   SAB TAB Ectopic Multiple Live Births   1 0 0 0 0      # Outcome Date GA Lbr Brent/2nd Weight Sex Delivery Anes PTL Lv   1 SAB                Past Medical History:   Diagnosis Date    ADHD (attention deficit hyperactivity disorder), inattentive type     Anxiety     Asthma     exercise induced; no inhaler use    Depression        Past Surgical History:   Procedure Laterality Date    CYST REMOVAL      groin area    DILATION AND CURETTAGE OF UTERUS      due to heavy/irregular menses    EXCISION OF GANGLION OF WRIST Left 2018    Procedure: EXCISION, GANGLION CYST, WRIST LEFT;  Surgeon: Walter Joyner Jr., MD;  Location: [facility];  Service: Plastics;  Laterality: Left;    MYOMECTOMY N/A 3/15/2019    Procedure: MYOMECTOMY OPEN;  Surgeon: Heena Castaneda MD;  Location: [facility];  Service: OB/GYN;  Laterality: N/A;  Dr. Becerra to asst       Review of Systems  Review of Systems   Constitutional: Negative for fatigue.   Gastrointestinal: Positive for diarrhea. Negative for abdominal distention  and constipation.   Endocrine: Negative for heat intolerance.   Genitourinary: Positive for pelvic pain (minimal today ). Negative for dysuria, vaginal bleeding and vaginal discharge.   Psychiatric/Behavioral: Positive for dysphoric mood and sleep disturbance.        Objective:   Physical Exam:        Pulmonary/Chest: Effort normal.        Abdominal: Soft. She exhibits abdominal incision (clean ). She exhibits no distension.     Genitourinary:    Vagina and uterus normal.                 Neurological: She is alert.    Skin: Skin is warm.         Assessment/ Plan:     Depression, unspecified depression type  -     Ambulatory referral/consult to Psychiatry; Future; Expected date: 09/30/2020    Fatigue, unspecified type  -     VITAMIN D; Future; Expected date: 09/23/2020  -     T3, free; Future; Expected date: 09/23/2020  -     T4, free; Future; Expected date: 09/23/2020  -     Iron and TIBC; Future; Expected date: 09/23/2020    Pelvic pain    discussed monitoring pain   Discussed seeing psychiatry   Discussed alcohol use  Will f/u 4 weeks      No follow-ups on file.    Patient was counseled today on A.C.S. Pap guidelines and recommendations for yearly pelvic exams, mammograms and monthly self breast exams; to see her PCP for other health maintenance.

## 2020-09-25 ENCOUNTER — LAB VISIT (OUTPATIENT)
Dept: LAB | Facility: OTHER | Age: 45
End: 2020-09-25
Attending: OBSTETRICS & GYNECOLOGY
Payer: COMMERCIAL

## 2020-09-25 DIAGNOSIS — Z13.1 SCREENING FOR DIABETES MELLITUS: ICD-10-CM

## 2020-09-25 DIAGNOSIS — R53.83 FATIGUE, UNSPECIFIED TYPE: ICD-10-CM

## 2020-09-25 DIAGNOSIS — Z00.00 ANNUAL PHYSICAL EXAM: ICD-10-CM

## 2020-09-25 DIAGNOSIS — Z13.220 SCREENING FOR LIPID DISORDERS: ICD-10-CM

## 2020-09-25 LAB
25(OH)D3+25(OH)D2 SERPL-MCNC: 34 NG/ML (ref 30–96)
ALBUMIN SERPL BCP-MCNC: 4.2 G/DL (ref 3.5–5.2)
ALP SERPL-CCNC: 42 U/L (ref 55–135)
ALT SERPL W/O P-5'-P-CCNC: 23 U/L (ref 10–44)
ANION GAP SERPL CALC-SCNC: 8 MMOL/L (ref 8–16)
AST SERPL-CCNC: 23 U/L (ref 10–40)
BASOPHILS # BLD AUTO: 0.02 K/UL (ref 0–0.2)
BASOPHILS NFR BLD: 0.3 % (ref 0–1.9)
BILIRUB SERPL-MCNC: 0.7 MG/DL (ref 0.1–1)
BUN SERPL-MCNC: 10 MG/DL (ref 6–20)
CALCIUM SERPL-MCNC: 8.8 MG/DL (ref 8.7–10.5)
CHLORIDE SERPL-SCNC: 106 MMOL/L (ref 95–110)
CHOLEST SERPL-MCNC: 244 MG/DL (ref 120–199)
CHOLEST/HDLC SERPL: 2.7 {RATIO} (ref 2–5)
CO2 SERPL-SCNC: 25 MMOL/L (ref 23–29)
CREAT SERPL-MCNC: 0.9 MG/DL (ref 0.5–1.4)
DIFFERENTIAL METHOD: ABNORMAL
EOSINOPHIL # BLD AUTO: 0.2 K/UL (ref 0–0.5)
EOSINOPHIL NFR BLD: 2.7 % (ref 0–8)
ERYTHROCYTE [DISTWIDTH] IN BLOOD BY AUTOMATED COUNT: 14.9 % (ref 11.5–14.5)
EST. GFR  (AFRICAN AMERICAN): >60 ML/MIN/1.73 M^2
EST. GFR  (NON AFRICAN AMERICAN): >60 ML/MIN/1.73 M^2
GLUCOSE SERPL-MCNC: 87 MG/DL (ref 70–110)
HCT VFR BLD AUTO: 39.6 % (ref 37–48.5)
HDLC SERPL-MCNC: 90 MG/DL (ref 40–75)
HDLC SERPL: 36.9 % (ref 20–50)
HGB BLD-MCNC: 12.7 G/DL (ref 12–16)
IMM GRANULOCYTES # BLD AUTO: 0.02 K/UL (ref 0–0.04)
IMM GRANULOCYTES NFR BLD AUTO: 0.3 % (ref 0–0.5)
IRON SERPL-MCNC: 82 UG/DL (ref 30–160)
LDLC SERPL CALC-MCNC: 136.6 MG/DL (ref 63–159)
LYMPHOCYTES # BLD AUTO: 1.6 K/UL (ref 1–4.8)
LYMPHOCYTES NFR BLD: 27.3 % (ref 18–48)
MCH RBC QN AUTO: 26.8 PG (ref 27–31)
MCHC RBC AUTO-ENTMCNC: 32.1 G/DL (ref 32–36)
MCV RBC AUTO: 84 FL (ref 82–98)
MONOCYTES # BLD AUTO: 0.5 K/UL (ref 0.3–1)
MONOCYTES NFR BLD: 7.7 % (ref 4–15)
NEUTROPHILS # BLD AUTO: 3.7 K/UL (ref 1.8–7.7)
NEUTROPHILS NFR BLD: 61.7 % (ref 38–73)
NONHDLC SERPL-MCNC: 154 MG/DL
NRBC BLD-RTO: 0 /100 WBC
PLATELET # BLD AUTO: 242 K/UL (ref 150–350)
PMV BLD AUTO: 10.4 FL (ref 9.2–12.9)
POTASSIUM SERPL-SCNC: 4.4 MMOL/L (ref 3.5–5.1)
PROT SERPL-MCNC: 7.5 G/DL (ref 6–8.4)
RBC # BLD AUTO: 4.73 M/UL (ref 4–5.4)
SATURATED IRON: 19 % (ref 20–50)
SODIUM SERPL-SCNC: 139 MMOL/L (ref 136–145)
T3FREE SERPL-MCNC: 2.8 PG/ML (ref 2.3–4.2)
T4 FREE SERPL-MCNC: 0.95 NG/DL (ref 0.71–1.51)
TOTAL IRON BINDING CAPACITY: 431 UG/DL (ref 250–450)
TRANSFERRIN SERPL-MCNC: 291 MG/DL (ref 200–375)
TRIGL SERPL-MCNC: 87 MG/DL (ref 30–150)
TSH SERPL DL<=0.005 MIU/L-ACNC: 1.51 UIU/ML (ref 0.4–4)
WBC # BLD AUTO: 5.96 K/UL (ref 3.9–12.7)

## 2020-09-25 PROCEDURE — 36415 COLL VENOUS BLD VENIPUNCTURE: CPT

## 2020-09-25 PROCEDURE — 84443 ASSAY THYROID STIM HORMONE: CPT

## 2020-09-25 PROCEDURE — 84481 FREE ASSAY (FT-3): CPT

## 2020-09-25 PROCEDURE — 82306 VITAMIN D 25 HYDROXY: CPT

## 2020-09-25 PROCEDURE — 84439 ASSAY OF FREE THYROXINE: CPT

## 2020-09-25 PROCEDURE — 85025 COMPLETE CBC W/AUTO DIFF WBC: CPT

## 2020-09-25 PROCEDURE — 83540 ASSAY OF IRON: CPT

## 2020-09-25 PROCEDURE — 80053 COMPREHEN METABOLIC PANEL: CPT

## 2020-09-25 PROCEDURE — 80061 LIPID PANEL: CPT

## 2020-10-02 ENCOUNTER — PATIENT MESSAGE (OUTPATIENT)
Dept: OBSTETRICS AND GYNECOLOGY | Facility: CLINIC | Age: 45
End: 2020-10-02

## 2021-01-19 ENCOUNTER — PATIENT MESSAGE (OUTPATIENT)
Dept: OBSTETRICS AND GYNECOLOGY | Facility: CLINIC | Age: 46
End: 2021-01-19

## 2021-04-13 ENCOUNTER — TELEPHONE (OUTPATIENT)
Dept: INTERNAL MEDICINE | Facility: CLINIC | Age: 46
End: 2021-04-13

## 2021-04-26 ENCOUNTER — PATIENT MESSAGE (OUTPATIENT)
Dept: RESEARCH | Facility: HOSPITAL | Age: 46
End: 2021-04-26

## 2021-05-05 ENCOUNTER — OFFICE VISIT (OUTPATIENT)
Dept: PRIMARY CARE CLINIC | Facility: CLINIC | Age: 46
End: 2021-05-05
Payer: COMMERCIAL

## 2021-05-05 ENCOUNTER — LAB VISIT (OUTPATIENT)
Dept: LAB | Facility: HOSPITAL | Age: 46
End: 2021-05-05
Attending: FAMILY MEDICINE
Payer: COMMERCIAL

## 2021-05-05 VITALS
DIASTOLIC BLOOD PRESSURE: 66 MMHG | WEIGHT: 187.19 LBS | TEMPERATURE: 98 F | HEART RATE: 76 BPM | HEIGHT: 64 IN | OXYGEN SATURATION: 99 % | BODY MASS INDEX: 31.96 KG/M2 | SYSTOLIC BLOOD PRESSURE: 118 MMHG | RESPIRATION RATE: 18 BRPM

## 2021-05-05 DIAGNOSIS — Z00.00 LABORATORY EXAM ORDERED AS PART OF ROUTINE GENERAL MEDICAL EXAMINATION: ICD-10-CM

## 2021-05-05 DIAGNOSIS — G89.29 CHRONIC HIP PAIN, RIGHT: ICD-10-CM

## 2021-05-05 DIAGNOSIS — G89.29 CHRONIC MIDLINE LOW BACK PAIN WITHOUT SCIATICA: ICD-10-CM

## 2021-05-05 DIAGNOSIS — G89.29 CHRONIC PAIN OF BOTH KNEES: ICD-10-CM

## 2021-05-05 DIAGNOSIS — R06.83 SNORING: ICD-10-CM

## 2021-05-05 DIAGNOSIS — H90.3 SENSORINEURAL HEARING LOSS (SNHL) OF BOTH EARS: ICD-10-CM

## 2021-05-05 DIAGNOSIS — F32.A ANXIETY AND DEPRESSION: ICD-10-CM

## 2021-05-05 DIAGNOSIS — J30.89 NON-SEASONAL ALLERGIC RHINITIS DUE TO OTHER ALLERGIC TRIGGER: ICD-10-CM

## 2021-05-05 DIAGNOSIS — M25.562 CHRONIC PAIN OF BOTH KNEES: ICD-10-CM

## 2021-05-05 DIAGNOSIS — G57.11 MERALGIA PARESTHETICA OF RIGHT SIDE: ICD-10-CM

## 2021-05-05 DIAGNOSIS — Z76.89 ENCOUNTER TO ESTABLISH CARE: ICD-10-CM

## 2021-05-05 DIAGNOSIS — G47.9 SLEEP DIFFICULTIES: ICD-10-CM

## 2021-05-05 DIAGNOSIS — M25.561 CHRONIC PAIN OF BOTH KNEES: ICD-10-CM

## 2021-05-05 DIAGNOSIS — M54.50 CHRONIC MIDLINE LOW BACK PAIN WITHOUT SCIATICA: ICD-10-CM

## 2021-05-05 DIAGNOSIS — G89.29 CHRONIC MUSCULOSKELETAL PAIN: ICD-10-CM

## 2021-05-05 DIAGNOSIS — J34.2 NASAL SEPTAL DEVIATION: ICD-10-CM

## 2021-05-05 DIAGNOSIS — F90.0 ATTENTION DEFICIT HYPERACTIVITY DISORDER (ADHD), PREDOMINANTLY INATTENTIVE TYPE: ICD-10-CM

## 2021-05-05 DIAGNOSIS — E66.09 CLASS 1 OBESITY DUE TO EXCESS CALORIES WITHOUT SERIOUS COMORBIDITY WITH BODY MASS INDEX (BMI) OF 32.0 TO 32.9 IN ADULT: ICD-10-CM

## 2021-05-05 DIAGNOSIS — Z98.890 S/P MYOMECTOMY: ICD-10-CM

## 2021-05-05 DIAGNOSIS — M79.18 CHRONIC MUSCULOSKELETAL PAIN: ICD-10-CM

## 2021-05-05 DIAGNOSIS — Z00.00 ANNUAL PHYSICAL EXAM: Primary | ICD-10-CM

## 2021-05-05 DIAGNOSIS — M25.551 CHRONIC HIP PAIN, RIGHT: ICD-10-CM

## 2021-05-05 DIAGNOSIS — R19.7 DIARRHEA, UNSPECIFIED TYPE: ICD-10-CM

## 2021-05-05 DIAGNOSIS — F41.9 ANXIETY AND DEPRESSION: ICD-10-CM

## 2021-05-05 LAB
ALBUMIN SERPL BCP-MCNC: 3.8 G/DL (ref 3.5–5.2)
ALP SERPL-CCNC: 42 U/L (ref 55–135)
ALT SERPL W/O P-5'-P-CCNC: 20 U/L (ref 10–44)
ANION GAP SERPL CALC-SCNC: 7 MMOL/L (ref 8–16)
AST SERPL-CCNC: 18 U/L (ref 10–40)
BASOPHILS # BLD AUTO: 0.03 K/UL (ref 0–0.2)
BASOPHILS NFR BLD: 0.5 % (ref 0–1.9)
BILIRUB SERPL-MCNC: 0.4 MG/DL (ref 0.1–1)
BUN SERPL-MCNC: 11 MG/DL (ref 6–20)
CALCIUM SERPL-MCNC: 9.5 MG/DL (ref 8.7–10.5)
CHLORIDE SERPL-SCNC: 107 MMOL/L (ref 95–110)
CHOLEST SERPL-MCNC: 240 MG/DL (ref 120–199)
CHOLEST/HDLC SERPL: 4.4 {RATIO} (ref 2–5)
CO2 SERPL-SCNC: 27 MMOL/L (ref 23–29)
CREAT SERPL-MCNC: 1 MG/DL (ref 0.5–1.4)
DIFFERENTIAL METHOD: ABNORMAL
EOSINOPHIL # BLD AUTO: 0.1 K/UL (ref 0–0.5)
EOSINOPHIL NFR BLD: 2 % (ref 0–8)
ERYTHROCYTE [DISTWIDTH] IN BLOOD BY AUTOMATED COUNT: 14.5 % (ref 11.5–14.5)
EST. GFR  (AFRICAN AMERICAN): >60 ML/MIN/1.73 M^2
EST. GFR  (NON AFRICAN AMERICAN): >60 ML/MIN/1.73 M^2
GLUCOSE SERPL-MCNC: 87 MG/DL (ref 70–110)
HCT VFR BLD AUTO: 39.7 % (ref 37–48.5)
HDLC SERPL-MCNC: 54 MG/DL (ref 40–75)
HDLC SERPL: 22.5 % (ref 20–50)
HGB BLD-MCNC: 12.4 G/DL (ref 12–16)
IMM GRANULOCYTES # BLD AUTO: 0.02 K/UL (ref 0–0.04)
IMM GRANULOCYTES NFR BLD AUTO: 0.3 % (ref 0–0.5)
LDLC SERPL CALC-MCNC: 176.6 MG/DL (ref 63–159)
LYMPHOCYTES # BLD AUTO: 1.7 K/UL (ref 1–4.8)
LYMPHOCYTES NFR BLD: 27.7 % (ref 18–48)
MCH RBC QN AUTO: 26.4 PG (ref 27–31)
MCHC RBC AUTO-ENTMCNC: 31.2 G/DL (ref 32–36)
MCV RBC AUTO: 85 FL (ref 82–98)
MONOCYTES # BLD AUTO: 0.5 K/UL (ref 0.3–1)
MONOCYTES NFR BLD: 8.1 % (ref 4–15)
NEUTROPHILS # BLD AUTO: 3.8 K/UL (ref 1.8–7.7)
NEUTROPHILS NFR BLD: 61.4 % (ref 38–73)
NONHDLC SERPL-MCNC: 186 MG/DL
NRBC BLD-RTO: 0 /100 WBC
PLATELET # BLD AUTO: 227 K/UL (ref 150–450)
PMV BLD AUTO: 10.9 FL (ref 9.2–12.9)
POTASSIUM SERPL-SCNC: 4 MMOL/L (ref 3.5–5.1)
PROT SERPL-MCNC: 7.1 G/DL (ref 6–8.4)
RBC # BLD AUTO: 4.69 M/UL (ref 4–5.4)
SODIUM SERPL-SCNC: 141 MMOL/L (ref 136–145)
TRIGL SERPL-MCNC: 47 MG/DL (ref 30–150)
TSH SERPL DL<=0.005 MIU/L-ACNC: 1.02 UIU/ML (ref 0.4–4)
WBC # BLD AUTO: 6.14 K/UL (ref 3.9–12.7)

## 2021-05-05 PROCEDURE — 83036 HEMOGLOBIN GLYCOSYLATED A1C: CPT | Performed by: FAMILY MEDICINE

## 2021-05-05 PROCEDURE — 84443 ASSAY THYROID STIM HORMONE: CPT | Performed by: FAMILY MEDICINE

## 2021-05-05 PROCEDURE — 3008F PR BODY MASS INDEX (BMI) DOCUMENTED: ICD-10-PCS | Mod: CPTII,S$GLB,, | Performed by: FAMILY MEDICINE

## 2021-05-05 PROCEDURE — 99396 PREV VISIT EST AGE 40-64: CPT | Mod: S$GLB,,, | Performed by: FAMILY MEDICINE

## 2021-05-05 PROCEDURE — 3008F BODY MASS INDEX DOCD: CPT | Mod: CPTII,S$GLB,, | Performed by: FAMILY MEDICINE

## 2021-05-05 PROCEDURE — 99396 PR PREVENTIVE VISIT,EST,40-64: ICD-10-PCS | Mod: S$GLB,,, | Performed by: FAMILY MEDICINE

## 2021-05-05 PROCEDURE — 1126F PR PAIN SEVERITY QUANTIFIED, NO PAIN PRESENT: ICD-10-PCS | Mod: S$GLB,,, | Performed by: FAMILY MEDICINE

## 2021-05-05 PROCEDURE — 86803 HEPATITIS C AB TEST: CPT | Performed by: FAMILY MEDICINE

## 2021-05-05 PROCEDURE — 99999 PR PBB SHADOW E&M-EST. PATIENT-LVL V: CPT | Mod: PBBFAC,,, | Performed by: FAMILY MEDICINE

## 2021-05-05 PROCEDURE — 99999 PR PBB SHADOW E&M-EST. PATIENT-LVL V: ICD-10-PCS | Mod: PBBFAC,,, | Performed by: FAMILY MEDICINE

## 2021-05-05 PROCEDURE — 80053 COMPREHEN METABOLIC PANEL: CPT | Performed by: FAMILY MEDICINE

## 2021-05-05 PROCEDURE — 1126F AMNT PAIN NOTED NONE PRSNT: CPT | Mod: S$GLB,,, | Performed by: FAMILY MEDICINE

## 2021-05-05 PROCEDURE — 36415 COLL VENOUS BLD VENIPUNCTURE: CPT | Mod: PN | Performed by: FAMILY MEDICINE

## 2021-05-05 PROCEDURE — 80061 LIPID PANEL: CPT | Performed by: FAMILY MEDICINE

## 2021-05-05 PROCEDURE — 85025 COMPLETE CBC W/AUTO DIFF WBC: CPT | Performed by: FAMILY MEDICINE

## 2021-05-05 RX ORDER — ASCORBIC ACID 125 MG
TABLET,CHEWABLE ORAL
COMMUNITY
End: 2022-06-07

## 2021-05-06 ENCOUNTER — TELEPHONE (OUTPATIENT)
Dept: PRIMARY CARE CLINIC | Facility: CLINIC | Age: 46
End: 2021-05-06

## 2021-05-06 DIAGNOSIS — Z12.31 ENCOUNTER FOR SCREENING MAMMOGRAM FOR BREAST CANCER: Primary | ICD-10-CM

## 2021-05-06 PROBLEM — E78.00 HYPERCHOLESTEREMIA: Status: ACTIVE | Noted: 2021-05-06

## 2021-05-06 LAB
ESTIMATED AVG GLUCOSE: 114 MG/DL (ref 68–131)
HBA1C MFR BLD: 5.6 % (ref 4–5.6)
HCV AB SERPL QL IA: NEGATIVE

## 2021-05-07 ENCOUNTER — TELEPHONE (OUTPATIENT)
Dept: PRIMARY CARE CLINIC | Facility: CLINIC | Age: 46
End: 2021-05-07

## 2021-05-10 ENCOUNTER — OFFICE VISIT (OUTPATIENT)
Dept: GASTROENTEROLOGY | Facility: CLINIC | Age: 46
End: 2021-05-10
Payer: COMMERCIAL

## 2021-05-10 ENCOUNTER — PATIENT OUTREACH (OUTPATIENT)
Dept: ADMINISTRATIVE | Facility: OTHER | Age: 46
End: 2021-05-10

## 2021-05-10 ENCOUNTER — PATIENT MESSAGE (OUTPATIENT)
Dept: GASTROENTEROLOGY | Facility: CLINIC | Age: 46
End: 2021-05-10

## 2021-05-10 ENCOUNTER — LAB VISIT (OUTPATIENT)
Dept: LAB | Facility: HOSPITAL | Age: 46
End: 2021-05-10
Attending: NURSE PRACTITIONER
Payer: COMMERCIAL

## 2021-05-10 VITALS — WEIGHT: 192.88 LBS | BODY MASS INDEX: 32.14 KG/M2 | HEIGHT: 65 IN

## 2021-05-10 DIAGNOSIS — R19.7 DIARRHEA, UNSPECIFIED TYPE: ICD-10-CM

## 2021-05-10 DIAGNOSIS — Z01.812 PRE-PROCEDURE LAB EXAM: ICD-10-CM

## 2021-05-10 LAB
IGA SERPL-MCNC: 136 MG/DL (ref 40–350)
TSH SERPL DL<=0.005 MIU/L-ACNC: 0.86 UIU/ML (ref 0.4–4)

## 2021-05-10 PROCEDURE — 99999 PR PBB SHADOW E&M-EST. PATIENT-LVL III: CPT | Mod: PBBFAC,,, | Performed by: NURSE PRACTITIONER

## 2021-05-10 PROCEDURE — 1126F AMNT PAIN NOTED NONE PRSNT: CPT | Mod: S$GLB,,, | Performed by: NURSE PRACTITIONER

## 2021-05-10 PROCEDURE — 1126F PR PAIN SEVERITY QUANTIFIED, NO PAIN PRESENT: ICD-10-PCS | Mod: S$GLB,,, | Performed by: NURSE PRACTITIONER

## 2021-05-10 PROCEDURE — 99999 PR PBB SHADOW E&M-EST. PATIENT-LVL III: ICD-10-PCS | Mod: PBBFAC,,, | Performed by: NURSE PRACTITIONER

## 2021-05-10 PROCEDURE — 3008F PR BODY MASS INDEX (BMI) DOCUMENTED: ICD-10-PCS | Mod: CPTII,S$GLB,, | Performed by: NURSE PRACTITIONER

## 2021-05-10 PROCEDURE — 82784 ASSAY IGA/IGD/IGG/IGM EACH: CPT | Performed by: NURSE PRACTITIONER

## 2021-05-10 PROCEDURE — 83516 IMMUNOASSAY NONANTIBODY: CPT | Performed by: NURSE PRACTITIONER

## 2021-05-10 PROCEDURE — 84443 ASSAY THYROID STIM HORMONE: CPT | Performed by: NURSE PRACTITIONER

## 2021-05-10 PROCEDURE — 3008F BODY MASS INDEX DOCD: CPT | Mod: CPTII,S$GLB,, | Performed by: NURSE PRACTITIONER

## 2021-05-10 PROCEDURE — 99204 PR OFFICE/OUTPT VISIT, NEW, LEVL IV, 45-59 MIN: ICD-10-PCS | Mod: S$GLB,,, | Performed by: NURSE PRACTITIONER

## 2021-05-10 PROCEDURE — 99204 OFFICE O/P NEW MOD 45 MIN: CPT | Mod: S$GLB,,, | Performed by: NURSE PRACTITIONER

## 2021-05-10 RX ORDER — SODIUM, POTASSIUM,MAG SULFATES 17.5-3.13G
1 SOLUTION, RECONSTITUTED, ORAL ORAL DAILY
Qty: 1 KIT | Refills: 0 | Status: SHIPPED | OUTPATIENT
Start: 2021-05-10 | End: 2021-10-08

## 2021-05-11 ENCOUNTER — OFFICE VISIT (OUTPATIENT)
Dept: SLEEP MEDICINE | Facility: CLINIC | Age: 46
End: 2021-05-11
Payer: COMMERCIAL

## 2021-05-11 VITALS
SYSTOLIC BLOOD PRESSURE: 109 MMHG | WEIGHT: 192 LBS | HEART RATE: 67 BPM | HEIGHT: 65 IN | DIASTOLIC BLOOD PRESSURE: 64 MMHG | BODY MASS INDEX: 31.99 KG/M2

## 2021-05-11 DIAGNOSIS — R06.83 SNORING: ICD-10-CM

## 2021-05-11 DIAGNOSIS — G47.9 SLEEP DIFFICULTIES: ICD-10-CM

## 2021-05-11 PROCEDURE — 99999 PR PBB SHADOW E&M-EST. PATIENT-LVL III: CPT | Mod: PBBFAC,,, | Performed by: INTERNAL MEDICINE

## 2021-05-11 PROCEDURE — 3008F PR BODY MASS INDEX (BMI) DOCUMENTED: ICD-10-PCS | Mod: CPTII,S$GLB,, | Performed by: INTERNAL MEDICINE

## 2021-05-11 PROCEDURE — 1125F AMNT PAIN NOTED PAIN PRSNT: CPT | Mod: S$GLB,,, | Performed by: INTERNAL MEDICINE

## 2021-05-11 PROCEDURE — 1125F PR PAIN SEVERITY QUANTIFIED, PAIN PRESENT: ICD-10-PCS | Mod: S$GLB,,, | Performed by: INTERNAL MEDICINE

## 2021-05-11 PROCEDURE — 3008F BODY MASS INDEX DOCD: CPT | Mod: CPTII,S$GLB,, | Performed by: INTERNAL MEDICINE

## 2021-05-11 PROCEDURE — 99204 PR OFFICE/OUTPT VISIT, NEW, LEVL IV, 45-59 MIN: ICD-10-PCS | Mod: S$GLB,,, | Performed by: INTERNAL MEDICINE

## 2021-05-11 PROCEDURE — 99204 OFFICE O/P NEW MOD 45 MIN: CPT | Mod: S$GLB,,, | Performed by: INTERNAL MEDICINE

## 2021-05-11 PROCEDURE — 99999 PR PBB SHADOW E&M-EST. PATIENT-LVL III: ICD-10-PCS | Mod: PBBFAC,,, | Performed by: INTERNAL MEDICINE

## 2021-05-13 ENCOUNTER — PATIENT MESSAGE (OUTPATIENT)
Dept: GASTROENTEROLOGY | Facility: CLINIC | Age: 46
End: 2021-05-13

## 2021-05-13 LAB — TTG IGA SER-ACNC: 5 UNITS

## 2021-05-18 ENCOUNTER — PATIENT MESSAGE (OUTPATIENT)
Dept: SLEEP MEDICINE | Facility: OTHER | Age: 46
End: 2021-05-18

## 2021-05-18 ENCOUNTER — TELEPHONE (OUTPATIENT)
Dept: SLEEP MEDICINE | Facility: OTHER | Age: 46
End: 2021-05-18

## 2021-05-18 ENCOUNTER — PATIENT MESSAGE (OUTPATIENT)
Dept: GASTROENTEROLOGY | Facility: CLINIC | Age: 46
End: 2021-05-18

## 2021-05-21 ENCOUNTER — PATIENT OUTREACH (OUTPATIENT)
Dept: ADMINISTRATIVE | Facility: HOSPITAL | Age: 46
End: 2021-05-21

## 2021-05-31 ENCOUNTER — PATIENT MESSAGE (OUTPATIENT)
Dept: GASTROENTEROLOGY | Facility: CLINIC | Age: 46
End: 2021-05-31

## 2021-05-31 ENCOUNTER — TELEPHONE (OUTPATIENT)
Dept: GASTROENTEROLOGY | Facility: CLINIC | Age: 46
End: 2021-05-31

## 2021-06-14 ENCOUNTER — HOSPITAL ENCOUNTER (OUTPATIENT)
Dept: SLEEP MEDICINE | Facility: OTHER | Age: 46
Discharge: HOME | End: 2021-06-14
Attending: INTERNAL MEDICINE
Payer: COMMERCIAL

## 2021-06-14 DIAGNOSIS — R06.83 SNORING: ICD-10-CM

## 2021-06-14 PROCEDURE — 95800 SLP STDY UNATTENDED: CPT

## 2021-06-14 PROCEDURE — 95800 SLP STDY UNATTENDED: CPT | Mod: 26,52,, | Performed by: INTERNAL MEDICINE

## 2021-06-14 PROCEDURE — 95800 PR SLEEP STUDY, UNATTENDED, RECORD HEART RATE/O2 SAT/RESP ANAL/SLEEP TIME: ICD-10-PCS | Mod: 26,52,, | Performed by: INTERNAL MEDICINE

## 2021-06-22 ENCOUNTER — PATIENT MESSAGE (OUTPATIENT)
Dept: SLEEP MEDICINE | Facility: CLINIC | Age: 46
End: 2021-06-22

## 2021-06-22 DIAGNOSIS — G47.33 OSA (OBSTRUCTIVE SLEEP APNEA): Primary | ICD-10-CM

## 2021-07-29 ENCOUNTER — PATIENT MESSAGE (OUTPATIENT)
Dept: OBSTETRICS AND GYNECOLOGY | Facility: CLINIC | Age: 46
End: 2021-07-29

## 2021-09-01 ENCOUNTER — PATIENT MESSAGE (OUTPATIENT)
Dept: SLEEP MEDICINE | Facility: CLINIC | Age: 46
End: 2021-09-01

## 2021-10-02 ENCOUNTER — PATIENT MESSAGE (OUTPATIENT)
Dept: OBSTETRICS AND GYNECOLOGY | Facility: CLINIC | Age: 46
End: 2021-10-02

## 2021-10-08 ENCOUNTER — OFFICE VISIT (OUTPATIENT)
Dept: OBSTETRICS AND GYNECOLOGY | Facility: CLINIC | Age: 46
End: 2021-10-08
Attending: OBSTETRICS & GYNECOLOGY
Payer: COMMERCIAL

## 2021-10-08 VITALS
WEIGHT: 187.38 LBS | DIASTOLIC BLOOD PRESSURE: 80 MMHG | BODY MASS INDEX: 31.99 KG/M2 | HEIGHT: 64 IN | SYSTOLIC BLOOD PRESSURE: 118 MMHG

## 2021-10-08 DIAGNOSIS — Z12.4 ENCOUNTER FOR SCREENING FOR CERVICAL CANCER: Primary | ICD-10-CM

## 2021-10-08 DIAGNOSIS — Z11.51 ENCOUNTER FOR SCREENING FOR HUMAN PAPILLOMAVIRUS (HPV): ICD-10-CM

## 2021-10-08 DIAGNOSIS — N94.6 DYSMENORRHEA: ICD-10-CM

## 2021-10-08 PROCEDURE — 99213 PR OFFICE/OUTPT VISIT, EST, LEVL III, 20-29 MIN: ICD-10-PCS | Mod: S$GLB,,, | Performed by: OBSTETRICS & GYNECOLOGY

## 2021-10-08 PROCEDURE — 3008F BODY MASS INDEX DOCD: CPT | Mod: CPTII,S$GLB,, | Performed by: OBSTETRICS & GYNECOLOGY

## 2021-10-08 PROCEDURE — 1160F RVW MEDS BY RX/DR IN RCRD: CPT | Mod: CPTII,S$GLB,, | Performed by: OBSTETRICS & GYNECOLOGY

## 2021-10-08 PROCEDURE — 1159F PR MEDICATION LIST DOCUMENTED IN MEDICAL RECORD: ICD-10-PCS | Mod: CPTII,S$GLB,, | Performed by: OBSTETRICS & GYNECOLOGY

## 2021-10-08 PROCEDURE — 3079F DIAST BP 80-89 MM HG: CPT | Mod: CPTII,S$GLB,, | Performed by: OBSTETRICS & GYNECOLOGY

## 2021-10-08 PROCEDURE — 88175 CYTOPATH C/V AUTO FLUID REDO: CPT | Performed by: OBSTETRICS & GYNECOLOGY

## 2021-10-08 PROCEDURE — 3044F PR MOST RECENT HEMOGLOBIN A1C LEVEL <7.0%: ICD-10-PCS | Mod: CPTII,S$GLB,, | Performed by: OBSTETRICS & GYNECOLOGY

## 2021-10-08 PROCEDURE — 3008F PR BODY MASS INDEX (BMI) DOCUMENTED: ICD-10-PCS | Mod: CPTII,S$GLB,, | Performed by: OBSTETRICS & GYNECOLOGY

## 2021-10-08 PROCEDURE — 99213 OFFICE O/P EST LOW 20 MIN: CPT | Mod: S$GLB,,, | Performed by: OBSTETRICS & GYNECOLOGY

## 2021-10-08 PROCEDURE — 99999 PR PBB SHADOW E&M-EST. PATIENT-LVL III: ICD-10-PCS | Mod: PBBFAC,,, | Performed by: OBSTETRICS & GYNECOLOGY

## 2021-10-08 PROCEDURE — 3074F SYST BP LT 130 MM HG: CPT | Mod: CPTII,S$GLB,, | Performed by: OBSTETRICS & GYNECOLOGY

## 2021-10-08 PROCEDURE — 1159F MED LIST DOCD IN RCRD: CPT | Mod: CPTII,S$GLB,, | Performed by: OBSTETRICS & GYNECOLOGY

## 2021-10-08 PROCEDURE — 99999 PR PBB SHADOW E&M-EST. PATIENT-LVL III: CPT | Mod: PBBFAC,,, | Performed by: OBSTETRICS & GYNECOLOGY

## 2021-10-08 PROCEDURE — 3079F PR MOST RECENT DIASTOLIC BLOOD PRESSURE 80-89 MM HG: ICD-10-PCS | Mod: CPTII,S$GLB,, | Performed by: OBSTETRICS & GYNECOLOGY

## 2021-10-08 PROCEDURE — 87624 HPV HI-RISK TYP POOLED RSLT: CPT | Performed by: OBSTETRICS & GYNECOLOGY

## 2021-10-08 PROCEDURE — 3044F HG A1C LEVEL LT 7.0%: CPT | Mod: CPTII,S$GLB,, | Performed by: OBSTETRICS & GYNECOLOGY

## 2021-10-08 PROCEDURE — 1160F PR REVIEW ALL MEDS BY PRESCRIBER/CLIN PHARMACIST DOCUMENTED: ICD-10-PCS | Mod: CPTII,S$GLB,, | Performed by: OBSTETRICS & GYNECOLOGY

## 2021-10-08 PROCEDURE — 3074F PR MOST RECENT SYSTOLIC BLOOD PRESSURE < 130 MM HG: ICD-10-PCS | Mod: CPTII,S$GLB,, | Performed by: OBSTETRICS & GYNECOLOGY

## 2021-10-08 RX ORDER — TRANEXAMIC ACID 650 MG/1
1300 TABLET ORAL 3 TIMES DAILY
Qty: 30 TABLET | Refills: 5 | Status: SHIPPED | OUTPATIENT
Start: 2021-10-08 | End: 2022-06-07

## 2021-10-21 ENCOUNTER — TELEPHONE (OUTPATIENT)
Dept: GASTROENTEROLOGY | Facility: CLINIC | Age: 46
End: 2021-10-21

## 2021-11-01 ENCOUNTER — PATIENT OUTREACH (OUTPATIENT)
Dept: ADMINISTRATIVE | Facility: OTHER | Age: 46
End: 2021-11-01
Payer: COMMERCIAL

## 2021-11-03 ENCOUNTER — OFFICE VISIT (OUTPATIENT)
Dept: OBSTETRICS AND GYNECOLOGY | Facility: CLINIC | Age: 46
End: 2021-11-03
Attending: OBSTETRICS & GYNECOLOGY
Payer: COMMERCIAL

## 2021-11-03 VITALS
HEIGHT: 64 IN | SYSTOLIC BLOOD PRESSURE: 120 MMHG | DIASTOLIC BLOOD PRESSURE: 78 MMHG | BODY MASS INDEX: 31.96 KG/M2 | WEIGHT: 187.19 LBS

## 2021-11-03 DIAGNOSIS — D21.9 LEIOMYOMA: Primary | ICD-10-CM

## 2021-11-03 PROCEDURE — 99999 PR PBB SHADOW E&M-EST. PATIENT-LVL III: ICD-10-PCS | Mod: PBBFAC,,, | Performed by: OBSTETRICS & GYNECOLOGY

## 2021-11-03 PROCEDURE — 1160F PR REVIEW ALL MEDS BY PRESCRIBER/CLIN PHARMACIST DOCUMENTED: ICD-10-PCS | Mod: CPTII,S$GLB,, | Performed by: OBSTETRICS & GYNECOLOGY

## 2021-11-03 PROCEDURE — 3008F PR BODY MASS INDEX (BMI) DOCUMENTED: ICD-10-PCS | Mod: CPTII,S$GLB,, | Performed by: OBSTETRICS & GYNECOLOGY

## 2021-11-03 PROCEDURE — 1159F PR MEDICATION LIST DOCUMENTED IN MEDICAL RECORD: ICD-10-PCS | Mod: CPTII,S$GLB,, | Performed by: OBSTETRICS & GYNECOLOGY

## 2021-11-03 PROCEDURE — 3044F HG A1C LEVEL LT 7.0%: CPT | Mod: CPTII,S$GLB,, | Performed by: OBSTETRICS & GYNECOLOGY

## 2021-11-03 PROCEDURE — 99213 PR OFFICE/OUTPT VISIT, EST, LEVL III, 20-29 MIN: ICD-10-PCS | Mod: S$GLB,,, | Performed by: OBSTETRICS & GYNECOLOGY

## 2021-11-03 PROCEDURE — 1160F RVW MEDS BY RX/DR IN RCRD: CPT | Mod: CPTII,S$GLB,, | Performed by: OBSTETRICS & GYNECOLOGY

## 2021-11-03 PROCEDURE — 3008F BODY MASS INDEX DOCD: CPT | Mod: CPTII,S$GLB,, | Performed by: OBSTETRICS & GYNECOLOGY

## 2021-11-03 PROCEDURE — 3078F PR MOST RECENT DIASTOLIC BLOOD PRESSURE < 80 MM HG: ICD-10-PCS | Mod: CPTII,S$GLB,, | Performed by: OBSTETRICS & GYNECOLOGY

## 2021-11-03 PROCEDURE — 3078F DIAST BP <80 MM HG: CPT | Mod: CPTII,S$GLB,, | Performed by: OBSTETRICS & GYNECOLOGY

## 2021-11-03 PROCEDURE — 3074F SYST BP LT 130 MM HG: CPT | Mod: CPTII,S$GLB,, | Performed by: OBSTETRICS & GYNECOLOGY

## 2021-11-03 PROCEDURE — 1159F MED LIST DOCD IN RCRD: CPT | Mod: CPTII,S$GLB,, | Performed by: OBSTETRICS & GYNECOLOGY

## 2021-11-03 PROCEDURE — 99999 PR PBB SHADOW E&M-EST. PATIENT-LVL III: CPT | Mod: PBBFAC,,, | Performed by: OBSTETRICS & GYNECOLOGY

## 2021-11-03 PROCEDURE — 3074F PR MOST RECENT SYSTOLIC BLOOD PRESSURE < 130 MM HG: ICD-10-PCS | Mod: CPTII,S$GLB,, | Performed by: OBSTETRICS & GYNECOLOGY

## 2021-11-03 PROCEDURE — 99213 OFFICE O/P EST LOW 20 MIN: CPT | Mod: S$GLB,,, | Performed by: OBSTETRICS & GYNECOLOGY

## 2021-11-03 PROCEDURE — 3044F PR MOST RECENT HEMOGLOBIN A1C LEVEL <7.0%: ICD-10-PCS | Mod: CPTII,S$GLB,, | Performed by: OBSTETRICS & GYNECOLOGY

## 2021-11-03 RX ORDER — PROGESTERONE 200 MG/1
200 CAPSULE ORAL NIGHTLY
Qty: 30 CAPSULE | Refills: 11 | Status: SHIPPED | OUTPATIENT
Start: 2021-11-03 | End: 2022-10-06

## 2021-11-03 RX ORDER — BUPROPION HYDROCHLORIDE 150 MG/1
150 TABLET ORAL EVERY OTHER DAY
COMMUNITY
Start: 2021-10-15 | End: 2023-09-13 | Stop reason: SDUPTHER

## 2021-11-04 ENCOUNTER — PATIENT MESSAGE (OUTPATIENT)
Dept: PRIMARY CARE CLINIC | Facility: CLINIC | Age: 46
End: 2021-11-04
Payer: COMMERCIAL

## 2021-11-04 ENCOUNTER — TELEPHONE (OUTPATIENT)
Dept: GASTROENTEROLOGY | Facility: CLINIC | Age: 46
End: 2021-11-04
Payer: COMMERCIAL

## 2021-11-24 ENCOUNTER — PATIENT MESSAGE (OUTPATIENT)
Dept: OBSTETRICS AND GYNECOLOGY | Facility: CLINIC | Age: 46
End: 2021-11-24
Payer: COMMERCIAL

## 2022-01-16 ENCOUNTER — PATIENT MESSAGE (OUTPATIENT)
Dept: PRIMARY CARE CLINIC | Facility: CLINIC | Age: 47
End: 2022-01-16
Payer: COMMERCIAL

## 2022-01-19 NOTE — TELEPHONE ENCOUNTER
alfredo pt and scheduled an appt for Friday. Pt was offered two appts for today but unable to commit to either

## 2022-01-20 NOTE — PROGRESS NOTES
Subjective:     Chief Complaint: Foot Injury (1-2-22)    HPI   Ms. Desiree Salas is a 46-year-old woman, establish with a colleague but new to me, with ADHD, anxiety/depression, asthma, hyperlipidemia, chronic pain to right hip/midline low back/bilateral knees, and left wrist ganglion cyst presenting for evaluation of foot pain:    Foot pain:  - questionably related to trauma (dove to protect a child from a falling ladder 1 week ago), but was immediately able to bear weight   - has been experiencing dull/focall pain to plantar surface of right forefoot (most focal to 1st MCP) without subjective joint edema or overlying erythema   - has a questionable family history (father) with gout, but has never been diagnosed personally    Review of Systems   Constitutional: Negative for appetite change, chills and fever.   HENT: Negative.    Respiratory: Negative for cough, chest tightness and shortness of breath.    Cardiovascular: Negative for chest pain, palpitations and leg swelling.   Gastrointestinal: Negative for abdominal distention, abdominal pain, blood in stool, constipation, diarrhea, nausea and vomiting.   Endocrine: Negative.    Genitourinary: Negative for difficulty urinating, dysuria, frequency and hematuria.   Musculoskeletal: Positive for arthralgias.   Integumentary:  Negative.   Neurological: Negative.    Psychiatric/Behavioral: Negative.          Objective:      Vitals:    01/21/22 0957   BP: 104/62   Pulse: 79   Resp: 18   Temp: 97.6 °F (36.4 °C)      Physical Exam  Vitals reviewed.   Constitutional:       General: She is not in acute distress.     Appearance: Normal appearance.   HENT:      Head: Normocephalic and atraumatic.      Comments: Facial features are symmetric      Nose: Nose normal. No congestion or rhinorrhea.      Mouth/Throat:      Mouth: Mucous membranes are moist.      Pharynx: Oropharynx is clear. No oropharyngeal exudate or posterior oropharyngeal erythema.   Eyes:      General: No  scleral icterus.     Extraocular Movements: Extraocular movements intact.      Conjunctiva/sclera: Conjunctivae normal.   Cardiovascular:      Rate and Rhythm: Normal rate and regular rhythm.      Pulses: Normal pulses.      Heart sounds: Normal heart sounds.   Pulmonary:      Effort: Pulmonary effort is normal. No respiratory distress.      Breath sounds: Normal breath sounds.   Musculoskeletal:         General: No deformity or signs of injury. Normal range of motion.      Cervical back: Normal range of motion.      Comments: Gait normal    Feet:      Comments: No obvious pathology to 1st MCP or overlying soft tissue  Skin:     General: Skin is warm and dry.      Findings: No rash.   Neurological:      General: No focal deficit present.      Mental Status: She is alert and oriented to person, place, and time. Mental status is at baseline.   Psychiatric:         Mood and Affect: Mood normal.         Behavior: Behavior normal.         Thought Content: Thought content normal.       Current Outpatient Medications on File Prior to Visit   Medication Sig Dispense Refill    buPROPion (WELLBUTRIN XL) 150 MG TB24 tablet Take 150 mg by mouth every morning.      FLUoxetine (PROZAC) 10 MG capsule TK ONE C PO  DAILY ON DAYS 14-28 OF CYCLE PLUS FIRST DAY OF MENSTRUATION  2    FLUoxetine (PROZAC) 40 MG capsule Take 80 mg by mouth once daily.      levomefolate calcium (DEPLIN ORAL) Take 15 mg by mouth once daily.      PNV no.95/ferrous fum/folic ac (PRENATAL MULTIVITAMINS ORAL) Take 1 tablet by mouth once daily.      progesterone (PROMETRIUM) 200 MG capsule Take 1 capsule (200 mg total) by mouth nightly. 30 capsule 11    cholecalciferol, vitamin D3, 25 mcg (1,000 unit) Chew Take by mouth.      tranexamic acid (LYSTEDA) 650 mg tablet Take 2 tablets (1,300 mg total) by mouth 3 (three) times daily. (Patient not taking: Reported on 11/3/2021) 30 tablet 5     No current facility-administered medications on file prior to visit.          Assessment:       1. Right foot pain        Plan:       Right foot pain  -     X-Ray Foot Complete Right; Future; Expected date: 01/21/2022   - low pretest probability for overt orthopedic injury, will acquire plain films given duration of symptoms   - have advocated for topical NSAID use, rest, and hard soled shoes

## 2022-01-21 ENCOUNTER — OFFICE VISIT (OUTPATIENT)
Dept: INTERNAL MEDICINE | Facility: CLINIC | Age: 47
End: 2022-01-21
Payer: COMMERCIAL

## 2022-01-21 ENCOUNTER — HOSPITAL ENCOUNTER (OUTPATIENT)
Dept: RADIOLOGY | Facility: HOSPITAL | Age: 47
Discharge: HOME | End: 2022-01-21
Attending: STUDENT IN AN ORGANIZED HEALTH CARE EDUCATION/TRAINING PROGRAM
Payer: COMMERCIAL

## 2022-01-21 VITALS
RESPIRATION RATE: 18 BRPM | OXYGEN SATURATION: 98 % | HEIGHT: 66 IN | WEIGHT: 184.75 LBS | BODY MASS INDEX: 29.69 KG/M2 | HEART RATE: 79 BPM | TEMPERATURE: 98 F | SYSTOLIC BLOOD PRESSURE: 104 MMHG | DIASTOLIC BLOOD PRESSURE: 62 MMHG

## 2022-01-21 DIAGNOSIS — M79.671 RIGHT FOOT PAIN: Primary | ICD-10-CM

## 2022-01-21 DIAGNOSIS — M79.671 RIGHT FOOT PAIN: ICD-10-CM

## 2022-01-21 PROCEDURE — 3078F DIAST BP <80 MM HG: CPT | Mod: CPTII,S$GLB,, | Performed by: STUDENT IN AN ORGANIZED HEALTH CARE EDUCATION/TRAINING PROGRAM

## 2022-01-21 PROCEDURE — 3078F PR MOST RECENT DIASTOLIC BLOOD PRESSURE < 80 MM HG: ICD-10-PCS | Mod: CPTII,S$GLB,, | Performed by: STUDENT IN AN ORGANIZED HEALTH CARE EDUCATION/TRAINING PROGRAM

## 2022-01-21 PROCEDURE — 73630 X-RAY EXAM OF FOOT: CPT | Mod: 26,RT,, | Performed by: RADIOLOGY

## 2022-01-21 PROCEDURE — 1159F PR MEDICATION LIST DOCUMENTED IN MEDICAL RECORD: ICD-10-PCS | Mod: CPTII,S$GLB,, | Performed by: STUDENT IN AN ORGANIZED HEALTH CARE EDUCATION/TRAINING PROGRAM

## 2022-01-21 PROCEDURE — 3074F SYST BP LT 130 MM HG: CPT | Mod: CPTII,S$GLB,, | Performed by: STUDENT IN AN ORGANIZED HEALTH CARE EDUCATION/TRAINING PROGRAM

## 2022-01-21 PROCEDURE — 73630 X-RAY EXAM OF FOOT: CPT | Mod: TC,PO,RT

## 2022-01-21 PROCEDURE — 3008F PR BODY MASS INDEX (BMI) DOCUMENTED: ICD-10-PCS | Mod: CPTII,S$GLB,, | Performed by: STUDENT IN AN ORGANIZED HEALTH CARE EDUCATION/TRAINING PROGRAM

## 2022-01-21 PROCEDURE — 99999 PR PBB SHADOW E&M-EST. PATIENT-LVL IV: ICD-10-PCS | Mod: PBBFAC,,, | Performed by: STUDENT IN AN ORGANIZED HEALTH CARE EDUCATION/TRAINING PROGRAM

## 2022-01-21 PROCEDURE — 3074F PR MOST RECENT SYSTOLIC BLOOD PRESSURE < 130 MM HG: ICD-10-PCS | Mod: CPTII,S$GLB,, | Performed by: STUDENT IN AN ORGANIZED HEALTH CARE EDUCATION/TRAINING PROGRAM

## 2022-01-21 PROCEDURE — 99999 PR PBB SHADOW E&M-EST. PATIENT-LVL IV: CPT | Mod: PBBFAC,,, | Performed by: STUDENT IN AN ORGANIZED HEALTH CARE EDUCATION/TRAINING PROGRAM

## 2022-01-21 PROCEDURE — 1159F MED LIST DOCD IN RCRD: CPT | Mod: CPTII,S$GLB,, | Performed by: STUDENT IN AN ORGANIZED HEALTH CARE EDUCATION/TRAINING PROGRAM

## 2022-01-21 PROCEDURE — 73630 XR FOOT COMPLETE 3 VIEW RIGHT: ICD-10-PCS | Mod: 26,RT,, | Performed by: RADIOLOGY

## 2022-01-21 PROCEDURE — 99214 OFFICE O/P EST MOD 30 MIN: CPT | Mod: S$GLB,,, | Performed by: STUDENT IN AN ORGANIZED HEALTH CARE EDUCATION/TRAINING PROGRAM

## 2022-01-21 PROCEDURE — 99214 PR OFFICE/OUTPT VISIT, EST, LEVL IV, 30-39 MIN: ICD-10-PCS | Mod: S$GLB,,, | Performed by: STUDENT IN AN ORGANIZED HEALTH CARE EDUCATION/TRAINING PROGRAM

## 2022-01-21 PROCEDURE — 3008F BODY MASS INDEX DOCD: CPT | Mod: CPTII,S$GLB,, | Performed by: STUDENT IN AN ORGANIZED HEALTH CARE EDUCATION/TRAINING PROGRAM

## 2022-03-17 ENCOUNTER — PATIENT MESSAGE (OUTPATIENT)
Dept: OBSTETRICS AND GYNECOLOGY | Facility: CLINIC | Age: 47
End: 2022-03-17
Payer: COMMERCIAL

## 2022-05-11 ENCOUNTER — PATIENT MESSAGE (OUTPATIENT)
Dept: OBSTETRICS AND GYNECOLOGY | Facility: CLINIC | Age: 47
End: 2022-05-11
Payer: COMMERCIAL

## 2022-05-13 ENCOUNTER — TELEPHONE (OUTPATIENT)
Dept: OBSTETRICS AND GYNECOLOGY | Facility: CLINIC | Age: 47
End: 2022-05-13
Payer: COMMERCIAL

## 2022-06-06 ENCOUNTER — PATIENT MESSAGE (OUTPATIENT)
Dept: PRIMARY CARE CLINIC | Facility: CLINIC | Age: 47
End: 2022-06-06
Payer: COMMERCIAL

## 2022-06-06 DIAGNOSIS — D49.7 PITUITARY TUMOR: Primary | ICD-10-CM

## 2022-06-07 ENCOUNTER — PATIENT MESSAGE (OUTPATIENT)
Dept: PRIMARY CARE CLINIC | Facility: CLINIC | Age: 47
End: 2022-06-07

## 2022-06-07 ENCOUNTER — OFFICE VISIT (OUTPATIENT)
Dept: PRIMARY CARE CLINIC | Facility: CLINIC | Age: 47
End: 2022-06-07
Payer: COMMERCIAL

## 2022-06-07 ENCOUNTER — LAB VISIT (OUTPATIENT)
Dept: LAB | Facility: HOSPITAL | Age: 47
End: 2022-06-07
Attending: FAMILY MEDICINE
Payer: COMMERCIAL

## 2022-06-07 VITALS
TEMPERATURE: 99 F | SYSTOLIC BLOOD PRESSURE: 112 MMHG | HEIGHT: 66 IN | HEART RATE: 76 BPM | DIASTOLIC BLOOD PRESSURE: 66 MMHG | BODY MASS INDEX: 28.38 KG/M2 | OXYGEN SATURATION: 98 % | WEIGHT: 176.56 LBS

## 2022-06-07 DIAGNOSIS — H90.3 SENSORINEURAL HEARING LOSS (SNHL) OF BOTH EARS: ICD-10-CM

## 2022-06-07 DIAGNOSIS — Z12.11 ENCOUNTER FOR SCREENING COLONOSCOPY: ICD-10-CM

## 2022-06-07 DIAGNOSIS — Z00.00 ANNUAL PHYSICAL EXAM: Primary | ICD-10-CM

## 2022-06-07 DIAGNOSIS — M79.18 CHRONIC MUSCULOSKELETAL PAIN: ICD-10-CM

## 2022-06-07 DIAGNOSIS — Z00.00 LABORATORY EXAM ORDERED AS PART OF ROUTINE GENERAL MEDICAL EXAMINATION: ICD-10-CM

## 2022-06-07 DIAGNOSIS — Z98.890 S/P MYOMECTOMY: ICD-10-CM

## 2022-06-07 DIAGNOSIS — F32.A ANXIETY AND DEPRESSION: ICD-10-CM

## 2022-06-07 DIAGNOSIS — J34.2 NASAL SEPTAL DEVIATION: ICD-10-CM

## 2022-06-07 DIAGNOSIS — E78.00 HYPERCHOLESTEREMIA: ICD-10-CM

## 2022-06-07 DIAGNOSIS — Z86.16 HISTORY OF COVID-19: ICD-10-CM

## 2022-06-07 DIAGNOSIS — F90.0 ATTENTION DEFICIT HYPERACTIVITY DISORDER (ADHD), PREDOMINANTLY INATTENTIVE TYPE: ICD-10-CM

## 2022-06-07 DIAGNOSIS — F41.9 ANXIETY AND DEPRESSION: ICD-10-CM

## 2022-06-07 DIAGNOSIS — G89.29 CHRONIC MUSCULOSKELETAL PAIN: ICD-10-CM

## 2022-06-07 DIAGNOSIS — J30.89 NON-SEASONAL ALLERGIC RHINITIS DUE TO OTHER ALLERGIC TRIGGER: ICD-10-CM

## 2022-06-07 DIAGNOSIS — D49.7 PITUITARY TUMOR: ICD-10-CM

## 2022-06-07 DIAGNOSIS — Z12.31 SCREENING MAMMOGRAM FOR BREAST CANCER: ICD-10-CM

## 2022-06-07 PROBLEM — E66.09 CLASS 1 OBESITY DUE TO EXCESS CALORIES WITHOUT SERIOUS COMORBIDITY WITH BODY MASS INDEX (BMI) OF 34.0 TO 34.9 IN ADULT: Status: RESOLVED | Noted: 2019-11-24 | Resolved: 2022-06-07

## 2022-06-07 PROBLEM — E66.811 CLASS 1 OBESITY DUE TO EXCESS CALORIES WITHOUT SERIOUS COMORBIDITY WITH BODY MASS INDEX (BMI) OF 34.0 TO 34.9 IN ADULT: Status: RESOLVED | Noted: 2019-11-24 | Resolved: 2022-06-07

## 2022-06-07 LAB
ALBUMIN SERPL BCP-MCNC: 3.9 G/DL (ref 3.5–5.2)
ALP SERPL-CCNC: 41 U/L (ref 55–135)
ALT SERPL W/O P-5'-P-CCNC: 24 U/L (ref 10–44)
ANION GAP SERPL CALC-SCNC: 8 MMOL/L (ref 8–16)
AST SERPL-CCNC: 22 U/L (ref 10–40)
BASOPHILS # BLD AUTO: 0.03 K/UL (ref 0–0.2)
BASOPHILS NFR BLD: 0.4 % (ref 0–1.9)
BILIRUB SERPL-MCNC: 0.7 MG/DL (ref 0.1–1)
BUN SERPL-MCNC: 8 MG/DL (ref 6–20)
CALCIUM SERPL-MCNC: 9.3 MG/DL (ref 8.7–10.5)
CHLORIDE SERPL-SCNC: 106 MMOL/L (ref 95–110)
CHOLEST SERPL-MCNC: 232 MG/DL (ref 120–199)
CHOLEST/HDLC SERPL: 4.1 {RATIO} (ref 2–5)
CO2 SERPL-SCNC: 25 MMOL/L (ref 23–29)
CREAT SERPL-MCNC: 1 MG/DL (ref 0.5–1.4)
DIFFERENTIAL METHOD: ABNORMAL
EOSINOPHIL # BLD AUTO: 0.1 K/UL (ref 0–0.5)
EOSINOPHIL NFR BLD: 0.8 % (ref 0–8)
ERYTHROCYTE [DISTWIDTH] IN BLOOD BY AUTOMATED COUNT: 14.3 % (ref 11.5–14.5)
EST. GFR  (AFRICAN AMERICAN): >60 ML/MIN/1.73 M^2
EST. GFR  (NON AFRICAN AMERICAN): >60 ML/MIN/1.73 M^2
ESTIMATED AVG GLUCOSE: 105 MG/DL (ref 68–131)
GLUCOSE SERPL-MCNC: 82 MG/DL (ref 70–110)
HBA1C MFR BLD: 5.3 % (ref 4–5.6)
HCT VFR BLD AUTO: 39.3 % (ref 37–48.5)
HDLC SERPL-MCNC: 57 MG/DL (ref 40–75)
HDLC SERPL: 24.6 % (ref 20–50)
HGB BLD-MCNC: 12.3 G/DL (ref 12–16)
IMM GRANULOCYTES # BLD AUTO: 0.03 K/UL (ref 0–0.04)
IMM GRANULOCYTES NFR BLD AUTO: 0.4 % (ref 0–0.5)
LDLC SERPL CALC-MCNC: 162.6 MG/DL (ref 63–159)
LYMPHOCYTES # BLD AUTO: 1.6 K/UL (ref 1–4.8)
LYMPHOCYTES NFR BLD: 19.6 % (ref 18–48)
MCH RBC QN AUTO: 26.8 PG (ref 27–31)
MCHC RBC AUTO-ENTMCNC: 31.3 G/DL (ref 32–36)
MCV RBC AUTO: 86 FL (ref 82–98)
MONOCYTES # BLD AUTO: 0.7 K/UL (ref 0.3–1)
MONOCYTES NFR BLD: 8 % (ref 4–15)
NEUTROPHILS # BLD AUTO: 5.9 K/UL (ref 1.8–7.7)
NEUTROPHILS NFR BLD: 70.8 % (ref 38–73)
NONHDLC SERPL-MCNC: 175 MG/DL
NRBC BLD-RTO: 0 /100 WBC
PLATELET # BLD AUTO: 252 K/UL (ref 150–450)
PMV BLD AUTO: 10.8 FL (ref 9.2–12.9)
POTASSIUM SERPL-SCNC: 4.1 MMOL/L (ref 3.5–5.1)
PROT SERPL-MCNC: 6.8 G/DL (ref 6–8.4)
RBC # BLD AUTO: 4.59 M/UL (ref 4–5.4)
SODIUM SERPL-SCNC: 139 MMOL/L (ref 136–145)
TRIGL SERPL-MCNC: 62 MG/DL (ref 30–150)
WBC # BLD AUTO: 8.28 K/UL (ref 3.9–12.7)

## 2022-06-07 PROCEDURE — 99396 PR PREVENTIVE VISIT,EST,40-64: ICD-10-PCS | Mod: S$GLB,,, | Performed by: FAMILY MEDICINE

## 2022-06-07 PROCEDURE — 80053 COMPREHEN METABOLIC PANEL: CPT | Performed by: FAMILY MEDICINE

## 2022-06-07 PROCEDURE — 3008F BODY MASS INDEX DOCD: CPT | Mod: CPTII,S$GLB,, | Performed by: FAMILY MEDICINE

## 2022-06-07 PROCEDURE — 1159F PR MEDICATION LIST DOCUMENTED IN MEDICAL RECORD: ICD-10-PCS | Mod: CPTII,S$GLB,, | Performed by: FAMILY MEDICINE

## 2022-06-07 PROCEDURE — 83036 HEMOGLOBIN GLYCOSYLATED A1C: CPT | Performed by: FAMILY MEDICINE

## 2022-06-07 PROCEDURE — 80061 LIPID PANEL: CPT | Performed by: FAMILY MEDICINE

## 2022-06-07 PROCEDURE — 3074F SYST BP LT 130 MM HG: CPT | Mod: CPTII,S$GLB,, | Performed by: FAMILY MEDICINE

## 2022-06-07 PROCEDURE — 3078F DIAST BP <80 MM HG: CPT | Mod: CPTII,S$GLB,, | Performed by: FAMILY MEDICINE

## 2022-06-07 PROCEDURE — 1160F RVW MEDS BY RX/DR IN RCRD: CPT | Mod: CPTII,S$GLB,, | Performed by: FAMILY MEDICINE

## 2022-06-07 PROCEDURE — 85025 COMPLETE CBC W/AUTO DIFF WBC: CPT | Performed by: FAMILY MEDICINE

## 2022-06-07 PROCEDURE — 99396 PREV VISIT EST AGE 40-64: CPT | Mod: S$GLB,,, | Performed by: FAMILY MEDICINE

## 2022-06-07 PROCEDURE — 36415 COLL VENOUS BLD VENIPUNCTURE: CPT | Mod: PN | Performed by: FAMILY MEDICINE

## 2022-06-07 PROCEDURE — 99999 PR PBB SHADOW E&M-EST. PATIENT-LVL V: CPT | Mod: PBBFAC,,, | Performed by: FAMILY MEDICINE

## 2022-06-07 PROCEDURE — 3078F PR MOST RECENT DIASTOLIC BLOOD PRESSURE < 80 MM HG: ICD-10-PCS | Mod: CPTII,S$GLB,, | Performed by: FAMILY MEDICINE

## 2022-06-07 PROCEDURE — 3008F PR BODY MASS INDEX (BMI) DOCUMENTED: ICD-10-PCS | Mod: CPTII,S$GLB,, | Performed by: FAMILY MEDICINE

## 2022-06-07 PROCEDURE — 99999 PR PBB SHADOW E&M-EST. PATIENT-LVL V: ICD-10-PCS | Mod: PBBFAC,,, | Performed by: FAMILY MEDICINE

## 2022-06-07 PROCEDURE — 1160F PR REVIEW ALL MEDS BY PRESCRIBER/CLIN PHARMACIST DOCUMENTED: ICD-10-PCS | Mod: CPTII,S$GLB,, | Performed by: FAMILY MEDICINE

## 2022-06-07 PROCEDURE — 3074F PR MOST RECENT SYSTOLIC BLOOD PRESSURE < 130 MM HG: ICD-10-PCS | Mod: CPTII,S$GLB,, | Performed by: FAMILY MEDICINE

## 2022-06-07 PROCEDURE — 1159F MED LIST DOCD IN RCRD: CPT | Mod: CPTII,S$GLB,, | Performed by: FAMILY MEDICINE

## 2022-06-07 RX ORDER — BUPROPION HYDROCHLORIDE 300 MG/1
300 TABLET ORAL EVERY OTHER DAY
COMMUNITY
Start: 2022-05-06 | End: 2023-01-06

## 2022-06-07 RX ORDER — MELATONIN 5 MG
1 CAPSULE ORAL NIGHTLY
COMMUNITY
End: 2022-10-06

## 2022-06-07 NOTE — PATIENT INSTRUCTIONS
Colonoscopy  Please call to schedule the appointment for colonoscopy:  [facility]

## 2022-06-07 NOTE — PROGRESS NOTES
Subjective:       Patient ID: Desiree Salas is a 47 y.o. female.    Chief Complaint: Annual Exam      48 yo female with a past medical history of Depression, anxiety, ADHD not on pharmacotherapy, allergic rhinitis, nasal septal deviation, sensorineural hearing loss of both ears (saw ENT), status post myomectomy for fibroids, right meralgia paresthetica, chronic low back pain, BMI 28, infertility with work up revealing pituitary adenoma; history of COVID 19.    Lipid disorders/ASCVD risk (ages >/= 45 or >/= 20 if increased risk ): Ordered  DM (>45y yearly or if obese, HTN): Ordered  Breast Cancer (40-50y discretion of pt, 50-74y every 1-2 years): Ordered; gets mammogram at [facility]  Cervical cancer screening: up to date  Colonoscopy: ordered    She has upcoming appointment with neurosurgery on 6/14/22.  MURIEL has discussed starting Cabergoline.    The following portions of the patient's history were reviewed and updated as appropriate: allergies, current medications, past family history, past medical history, past social history, past surgical history and problem list.    Review of Systems   Constitutional: Negative for activity change, appetite change, chills, diaphoresis, fatigue, fever and unexpected weight change.   HENT: Negative for nasal congestion, dental problem, facial swelling, hearing loss, nosebleeds, postnasal drip, rhinorrhea, sore throat, tinnitus and trouble swallowing.    Eyes: Negative for pain, discharge, itching and visual disturbance.   Respiratory: Negative for apnea, chest tightness, shortness of breath, wheezing and stridor.    Cardiovascular: Negative for chest pain, palpitations and leg swelling.   Gastrointestinal: Negative for abdominal distention, abdominal pain, blood in stool, change in bowel habit, nausea, rectal pain, vomiting and change in bowel habit.   Endocrine: Negative for cold intolerance, heat intolerance, polydipsia and polyuria.   Genitourinary: Positive for menstrual  "problem. Negative for difficulty urinating, dysuria, frequency, hematuria and urgency.   Musculoskeletal: Positive for arthralgias, back pain and myalgias. Negative for gait problem, joint swelling, neck pain and neck stiffness.   Integumentary:  Negative for color change and rash.   Neurological: Positive for headaches. Negative for dizziness, tremors, seizures, syncope, facial asymmetry, weakness and numbness.   Hematological: Negative for adenopathy. Does not bruise/bleed easily.   Psychiatric/Behavioral: Positive for dysphoric mood and sleep disturbance. Negative for agitation, confusion, hallucinations, self-injury and suicidal ideas. The patient is not hyperactive.           Objective:       Vitals:    06/07/22 0737   BP: 112/66   BP Location: Right arm   Patient Position: Sitting   Pulse: 76   Temp: 98.6 °F (37 °C)   TempSrc: Oral   SpO2: 98%   Weight: 80.1 kg (176 lb 9.4 oz)   Height: 5' 6" (1.676 m)     Physical Exam  Constitutional:       General: She is not in acute distress.     Appearance: She is well-developed. She is not diaphoretic.   HENT:      Head: Normocephalic and atraumatic.      Right Ear: External ear normal.      Left Ear: External ear normal.      Ears:      Comments: No nystagmus  Eyes:      General: No scleral icterus.        Right eye: No discharge.         Left eye: No discharge.      Conjunctiva/sclera: Conjunctivae normal.      Pupils: Pupils are equal, round, and reactive to light.   Neck:      Thyroid: No thyromegaly.   Cardiovascular:      Rate and Rhythm: Normal rate and regular rhythm.      Heart sounds: Normal heart sounds. No murmur heard.    No friction rub. No gallop.   Pulmonary:      Effort: Pulmonary effort is normal. No respiratory distress.      Breath sounds: Normal breath sounds.   Chest:      Chest wall: No tenderness.   Abdominal:      General: Bowel sounds are normal.      Palpations: Abdomen is soft. There is no mass.      Tenderness: There is no abdominal " tenderness. There is no guarding or rebound.   Musculoskeletal:         General: Normal range of motion.      Cervical back: Normal range of motion and neck supple.   Lymphadenopathy:      Cervical: No cervical adenopathy.   Skin:     General: Skin is warm.      Capillary Refill: Capillary refill takes less than 2 seconds.      Findings: No rash.   Neurological:      General: No focal deficit present.      Mental Status: She is alert and oriented to person, place, and time.      Motor: No abnormal muscle tone.   Psychiatric:         Thought Content: Thought content normal.         Judgment: Judgment normal.         Assessment:       1. Annual physical exam    2. Pituitary tumor    3. Anxiety and depression    4. Attention deficit hyperactivity disorder (ADHD), predominantly inattentive type    5. Chronic musculoskeletal pain    6. Non-seasonal allergic rhinitis due to other allergic trigger    7. Nasal septal deviation    8. Sensorineural hearing loss (SNHL) of both ears    9. History of COVID-19    10. Hypercholesteremia    11. BMI 28.0-28.9,adult    12. S/P myomectomy    13. Screening mammogram for breast cancer    14. Encounter for screening colonoscopy    15. Laboratory exam ordered as part of routine general medical examination        Plan:       1. Annual physical exam  The patient has been advised of the reasonably likely side effects and complications of medications and treatment.  Medications were reviewed and reconciled with refills sent to pharmacy as needed. Health maintenance was reviewed with recommendations per age and sex.  Immunizations reviewed and updated per guidelines unless patient declines. All questions were addressed and answered.    2. Pituitary tumor  Follow up with neurosurgery  Plan to start cabergoline by MURIEL    3. Anxiety and depression  4. Attention deficit hyperactivity disorder (ADHD), predominantly inattentive type  Follow up with psychiatrist    5. Chronic musculoskeletal  pain  Manages conservatively    6. Non-seasonal allergic rhinitis due to other allergic trigger  7. Nasal septal deviation  8. Sensorineural hearing loss (SNHL) of both ears  Follow with ENT    9. History of COVID-19  Recovered    10. Hypercholesteremia  Check lipid panel    11. BMI 28.0-28.9,adult  Encourage healthy lifestyle changes    12. S/P myomectomy  No adverse events    13. Screening mammogram for breast cancer  -     Mammo Digital Screening Bilat w/ Audi; Future    14. Encounter for screening colonoscopy  -     Case Request Endoscopy: COLONOSCOPY    15. Laboratory exam ordered as part of routine general medical examination  -     Lipid Panel; Future; Expected date: 06/07/2022  -     CBC Auto Differential; Future; Expected date: 06/07/2022  -     Comprehensive Metabolic Panel; Future; Expected date: 06/07/2022  -     Hemoglobin A1C; Future; Expected date: 06/07/2022      Disclaimer: This note has been generated using voice-recognition software. There may be typographical errors that have been missed during proof-reading

## 2022-06-09 ENCOUNTER — TELEPHONE (OUTPATIENT)
Dept: NEUROSURGERY | Facility: CLINIC | Age: 47
End: 2022-06-09
Payer: COMMERCIAL

## 2022-06-09 ENCOUNTER — TELEPHONE (OUTPATIENT)
Dept: PRIMARY CARE CLINIC | Facility: CLINIC | Age: 47
End: 2022-06-09
Payer: COMMERCIAL

## 2022-06-09 NOTE — TELEPHONE ENCOUNTER
OSMAN pt, confirmed she has MRI on a disc, pt will bring disc to NP visit 6/14/22 with Dr. Jones. She had no further questions at this time.

## 2022-06-09 NOTE — TELEPHONE ENCOUNTER
----- Message from Anahy Vasquez MD sent at 6/9/2022  9:23 AM CDT -----  Hello,    No diabetes    Elevated total cholesterol.  LDL, the bad cholesterol is also elevated.  Fortunately, your calculated risk of heart disease and stroke continues to be low.  Low-cholesterol food choices and exercise encouraged.  Repeat cholesterol testing in 1 year.    Normal liver and kidney function test    No anemia

## 2022-06-09 NOTE — TELEPHONE ENCOUNTER
LM for pt regarding her NP appt with Dr. Jones, asked if she has any information about her tumor diagnosis and that she MUST bring her scans to her appt with Dr. Jones. I asked pt to call back to confirm.

## 2022-06-14 ENCOUNTER — OFFICE VISIT (OUTPATIENT)
Dept: NEUROSURGERY | Facility: CLINIC | Age: 47
End: 2022-06-14
Payer: COMMERCIAL

## 2022-06-14 VITALS
HEIGHT: 66 IN | HEART RATE: 73 BPM | SYSTOLIC BLOOD PRESSURE: 133 MMHG | BODY MASS INDEX: 28.21 KG/M2 | DIASTOLIC BLOOD PRESSURE: 60 MMHG | WEIGHT: 175.5 LBS

## 2022-06-14 DIAGNOSIS — Q28.3 BRAIN VASCULAR MALFORMATION: ICD-10-CM

## 2022-06-14 DIAGNOSIS — D49.7 PITUITARY TUMOR: ICD-10-CM

## 2022-06-14 DIAGNOSIS — R79.89 ELEVATED PROLACTIN LEVEL: Primary | ICD-10-CM

## 2022-06-14 PROCEDURE — 1160F RVW MEDS BY RX/DR IN RCRD: CPT | Mod: CPTII,S$GLB,, | Performed by: NEUROLOGICAL SURGERY

## 2022-06-14 PROCEDURE — 1160F PR REVIEW ALL MEDS BY PRESCRIBER/CLIN PHARMACIST DOCUMENTED: ICD-10-PCS | Mod: CPTII,S$GLB,, | Performed by: NEUROLOGICAL SURGERY

## 2022-06-14 PROCEDURE — 3075F SYST BP GE 130 - 139MM HG: CPT | Mod: CPTII,S$GLB,, | Performed by: NEUROLOGICAL SURGERY

## 2022-06-14 PROCEDURE — 3078F DIAST BP <80 MM HG: CPT | Mod: CPTII,S$GLB,, | Performed by: NEUROLOGICAL SURGERY

## 2022-06-14 PROCEDURE — 3008F BODY MASS INDEX DOCD: CPT | Mod: CPTII,S$GLB,, | Performed by: NEUROLOGICAL SURGERY

## 2022-06-14 PROCEDURE — 99205 PR OFFICE/OUTPT VISIT, NEW, LEVL V, 60-74 MIN: ICD-10-PCS | Mod: S$GLB,,, | Performed by: NEUROLOGICAL SURGERY

## 2022-06-14 PROCEDURE — 3044F HG A1C LEVEL LT 7.0%: CPT | Mod: CPTII,S$GLB,, | Performed by: NEUROLOGICAL SURGERY

## 2022-06-14 PROCEDURE — 99205 OFFICE O/P NEW HI 60 MIN: CPT | Mod: S$GLB,,, | Performed by: NEUROLOGICAL SURGERY

## 2022-06-14 PROCEDURE — 1159F MED LIST DOCD IN RCRD: CPT | Mod: CPTII,S$GLB,, | Performed by: NEUROLOGICAL SURGERY

## 2022-06-14 PROCEDURE — 3078F PR MOST RECENT DIASTOLIC BLOOD PRESSURE < 80 MM HG: ICD-10-PCS | Mod: CPTII,S$GLB,, | Performed by: NEUROLOGICAL SURGERY

## 2022-06-14 PROCEDURE — 99999 PR PBB SHADOW E&M-EST. PATIENT-LVL V: CPT | Mod: PBBFAC,,, | Performed by: NEUROLOGICAL SURGERY

## 2022-06-14 PROCEDURE — 3008F PR BODY MASS INDEX (BMI) DOCUMENTED: ICD-10-PCS | Mod: CPTII,S$GLB,, | Performed by: NEUROLOGICAL SURGERY

## 2022-06-14 PROCEDURE — 99999 PR PBB SHADOW E&M-EST. PATIENT-LVL V: ICD-10-PCS | Mod: PBBFAC,,, | Performed by: NEUROLOGICAL SURGERY

## 2022-06-14 PROCEDURE — 1159F PR MEDICATION LIST DOCUMENTED IN MEDICAL RECORD: ICD-10-PCS | Mod: CPTII,S$GLB,, | Performed by: NEUROLOGICAL SURGERY

## 2022-06-14 PROCEDURE — 3044F PR MOST RECENT HEMOGLOBIN A1C LEVEL <7.0%: ICD-10-PCS | Mod: CPTII,S$GLB,, | Performed by: NEUROLOGICAL SURGERY

## 2022-06-14 PROCEDURE — 3075F PR MOST RECENT SYSTOLIC BLOOD PRESS GE 130-139MM HG: ICD-10-PCS | Mod: CPTII,S$GLB,, | Performed by: NEUROLOGICAL SURGERY

## 2022-06-14 NOTE — PROGRESS NOTES
CHIEF COMPLAINT:  Elevated prolactin  Possible pituitary tumor    HPI:  Desiree Salas is a 47 y.o.  female with below listed PMH, who is referred to me by ObGYN for evaluation of elevated prolactin and possible pituitary tumor.  Patient has been working closely with a [employer] for the past couple of years.  She has had difficulty getting pregnant therefore new pituitary hormone labs were obtained which showed an elevated prolactin level (99 down to 60 on repeat).  An MRI was not obtained that showed a 2.5 mm pituitary tumor for which the OB gynecologist recommended starting cabergoline.  Patient has refused due to concern about side effects.  Unfortunately the brain MRI does not come with the report and she does not have the official lab values.  She states that she has not noticed any lactation or change in breast size.  Her libido has been off intermittently when she has historically had an elevated libido.  She has decreased energy and fatigue.  She considers herself a stressed person.    Hyperprolactinemia:  []  breast tenderness []  nipple discharge   [x]  Menstrual Irregularity        []  Denies                  Thyroid:  []  fatigue       [x]  weight change (since 2017)     []  temp intolerance (cold)                 []  Denies   []  BM change           []  skin/hair change   [] palpitations []  tremor        []  Denies       Growth Hormone Excess:  []  increase hand/foot size        []  teeth spacing change                               [x]  Denies  []  worse glycemic control/htn           []  Carpal tunnel                               [x]  Denies       Cushing's syndrome:  []  Easy bruising         [x]  Weight gain           []  Worse glycemic control  []  HTN                       []  Acne                      []  Hirsutism  []  Striae                     [x]  Menstrual change []  Denies     Gonadotrophs:      []  Irregular menses   []  Postmenopausal   [x]  Decreased libido  (historically elevated)   []  ED                         []  Denies    DI:   []  polyuria                 []  Polydipsia              []  Nocturia x 1   [x]  Denies                Review of patient's allergies indicates:   Allergen Reactions    Grass pollen Other (See Comments)    House dust Itching       Past Medical History:   Diagnosis Date    ADHD (attention deficit hyperactivity disorder), inattentive type     Anxiety     Asthma     exercise induced; no inhaler use    Depression     Pituitary tumor      Past Surgical History:   Procedure Laterality Date    CYST REMOVAL      groin area    DILATION AND CURETTAGE OF UTERUS      due to heavy/irregular menses    EXCISION OF GANGLION OF WRIST Left 2018    Procedure: EXCISION, GANGLION CYST, WRIST LEFT;  Surgeon: Walter Joyner Jr., MD;  Location: [facility] OR;  Service: Plastics;  Laterality: Left;    MYOMECTOMY N/A 3/15/2019    Procedure: MYOMECTOMY OPEN;  Surgeon: Heena Castaneda MD;  Location: [facility] OR;  Service: OB/GYN;  Laterality: N/A;  Dr. Becerra to asst     Family History   Problem Relation Age of Onset    Alzheimer's disease Maternal Grandmother     Diabetes Father     Hypertension Father     Diabetes Mother     Cancer Brother         unsure of the type    Migraines Sister     Breast cancer Neg Hx      Social History     Tobacco Use    Smoking status: Former Smoker     Years: 2.00     Types: Cigarettes     Quit date: 2017     Years since quittin.9    Smokeless tobacco: Never Used    Tobacco comment: 1 pack in a week   Substance Use Topics    Alcohol use: Not Currently    Drug use: Yes     Types: Marijuana     Comment: daily        Review of Systems   Constitutional: Negative.    HENT: Negative for congestion, ear discharge, ear pain, hearing loss, nosebleeds, sinus pain and tinnitus.    Eyes: Negative.    Respiratory: Negative.    Cardiovascular: Negative for chest pain, palpitations, claudication and leg swelling.    Gastrointestinal: Negative for abdominal pain, blood in stool, constipation, diarrhea, melena and vomiting.   Genitourinary: Negative for flank pain, frequency and urgency.   Musculoskeletal: Negative for falls.   Skin: Negative.    Neurological: Negative.    Endo/Heme/Allergies: Does not bruise/bleed easily.   Psychiatric/Behavioral: Negative for depression, hallucinations, memory loss, substance abuse and suicidal ideas. The patient is nervous/anxious. The patient does not have insomnia.        OBJECTIVE:   Vital Signs:  Pulse: 73 (06/14/22 0858)  BP: 133/60 (06/14/22 0858)    Physical Exam:  Constitutional: Patient sitting comfortably in chair. Appears well developed and well nourished.  Skin: Exposed areas are intact without abnormal markings, rashes or other lesions.  HEENT: Normocephalic. Normal conjunctivae.  Cardiovascular: Normal rate and regular rhythm.  Respiratory: Chest wall rises and falls symmetrically, without signs of respiratory distress.  Abdomen: Soft and non-tender.  Extremities: Warm and without edema. Calves supple, non-tender.  Psych/Behavior: Normal affect.    Neurological:    Mental status: Alert and oriented. Conversational and appropriate.       Cranial Nerves: VFF to confrontation. PERRL. EOMI without nystagmus. Facial STLT normal and symmetric. Strong, symmetric muscles of mastication. Facial strength full and symmetric. Hearing equal bilaterally to finger rub. Palate and uvula rise and fall normally in midline. Shoulder shrug 5/5 strength. Tongue midline.     Motor:    Upper:  Deltoids Triceps Biceps WE WF     R 5/5 5/5 5/5 5/5 5/5 5/5    L 5/5 5/5 5/5 5/5 5/5 5/5      Lower:  HF KE KF DF PF EHL    R 5/5 5/5 5/5 5/5 5/5 5/5    L 5/5 5/5 5/5 5/5 5/5 5/5     Sensory: Intact sensation to light touch in all extremities. Romberg negative.    Reflexes:          DTR: 2+ symmetrically throughout.     Chavez's: Negative.     Babinski's: Negative.     Clonus: Negative.    Cerebellar:  Finger-to-nose and rapid alternating movements normal.     Gait stable    Diagnostic Results:  All imaging was independently reviewed by me.    Outside MRI brain, dated 5/31/22:  1. Possible small hypointensity within inferior aspect of pituitary gland   2. Enlarged vessel emerging from ICA bifurcation area within lentiform area      ASSESSMENT/PLAN:     Problem List Items Addressed This Visit        Neuro    Brain vascular malformation       Oncology    Pituitary tumor    Relevant Orders    Ambulatory referral/consult to Endocrinology    Ambulatory referral/consult to Ophthalmology    MRA Brain with and without contrast       Endocrine    Elevated prolactin level - Primary    Relevant Orders    Ambulatory referral/consult to Endocrinology    Ambulatory referral/consult to Ophthalmology    MRA Brain with and without contrast        1. Elevated prolactin - Mildly elevated (99>60) in the range more consistent with stalk effect but no large mass. Only abnormality is possible small hypointensity within inferior pit gland but no obvious tumor.  Likely contributory to infertility.  Referral to Dr Bruce (endo).  2. 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  Will get MRA.    - Referral to Dr Bruce (neuroendo)  - Referral to ophtho  - F/u with OBGYN as scheduled  - Ordered brain MRA re: [facility] malf  - Instructed to get outside labs and MRI report    The patient understands and agrees with the plan of care. All questions were answered.    Time spent on this encounter: 60 minutes. This includes face-to-face time and non-face to face time preparing to see the patient (eg, review of tests), obtaining and/or reviewing separately obtained history, documenting clinical information in the electronic or other health record, independently interpreting results and communicating results to the patient/family/caregiver, or care coordinator.          .

## 2022-06-14 NOTE — PROGRESS NOTES
Date:  6/16/2022    ?  Referring Provider:   Bonifacio Jones,     Copies of Letters to the Following:   Bonifacio Jones, DO   Loreto Bruce MD    Chief Complaint:  I saw Desiree Salas at the [facility] for neuro-ophthalmic evaluation.   She is a 47 y.o. female with a history of HLD, elevated prolactin who presents for evaluation of blurred vision.    History:       46 y/o female is here for Neuro-Ophthalmic -evaluation. H/o of Possible   pituitary tumor. Pt is trying to conceived and is currently treated at a   [facility]. Pt prolactin level were elevated. Pt present with c/o of   blurry vision and floaters. Last eye exam 8/2021. Pt is experiencing daily   headaches. Glare is a bothersome at night. Pt is a c/l wearer, replace   monthly. Denies any ocular sx/procedures.     Eyemeds  Rewetting drops       Had fertility issues, was found to have elevated prolactin which prompted neuroimaging. Initially concern for very small pituitary mass, not appreciated by Dr. Jones.     Contact lens wearer, monovision OD for near and OS for distance. Has pulling sensation behind the right eye, halos and light sensitivity. Bifrontal headaches.        ?  Current Outpatient Medications   Medication Sig Dispense Refill    buPROPion (WELLBUTRIN XL) 150 MG TB24 tablet Take 150 mg by mouth every other day.      buPROPion (WELLBUTRIN XL) 300 MG 24 hr tablet Take 300 mg by mouth every other day.      diphth,pertus,acell,,tetanus (BOOSTRIX TDAP) 2.5-8-5 Lf-mcg-Lf/0.5mL Syrg injection Inject into the muscle. 0.5 mL 0    FLUoxetine (PROZAC) 10 MG capsule TK ONE C PO  DAILY ON DAYS 14-28 OF CYCLE PLUS FIRST DAY OF MENSTRUATION  2    FLUoxetine (PROZAC) 40 MG capsule Take 80 mg by mouth once daily.      levomefolate calcium (DEPLIN ORAL) Take 15 mg by mouth once daily.      melatonin 5 mg Cap Take 1 each by mouth every evening.      PNV no.95/ferrous fum/folic ac (PRENATAL MULTIVITAMINS ORAL) Take 1 tablet  by mouth once daily.      progesterone (PROMETRIUM) 200 MG capsule Take 1 capsule (200 mg total) by mouth nightly. 30 capsule 11     No current facility-administered medications for this visit.     Review of patient's allergies indicates:   Allergen Reactions    Grass pollen Other (See Comments)    House dust Itching     Past Medical History:   Diagnosis Date    ADHD (attention deficit hyperactivity disorder), inattentive type     Anxiety     Asthma     exercise induced; no inhaler use    Depression     Pituitary tumor      Past Surgical History:   Procedure Laterality Date    CYST REMOVAL      groin area    DILATION AND CURETTAGE OF UTERUS      due to heavy/irregular menses    EXCISION OF GANGLION OF WRIST Left 2018    Procedure: EXCISION, GANGLION CYST, WRIST LEFT;  Surgeon: Walter Joyner Jr., MD;  Location: [facility] OR;  Service: Plastics;  Laterality: Left;    MYOMECTOMY N/A 3/15/2019    Procedure: MYOMECTOMY OPEN;  Surgeon: Heena Castaneda MD;  Location: [facility] OR;  Service: OB/GYN;  Laterality: N/A;  Dr. Becerra to asst     Family History   Problem Relation Age of Onset    Alzheimer's disease Maternal Grandmother     Diabetes Father     Hypertension Father     Diabetes Mother     Cancer Brother         unsure of the type    Migraines Sister     Breast cancer Neg Hx      Social History     Socioeconomic History    Marital status: Single   Occupational History     Comment: Works at [employer] -    Tobacco Use    Smoking status: Former Smoker     Years: 2.00     Types: Cigarettes     Quit date: 2017     Years since quittin.9    Smokeless tobacco: Never Used    Tobacco comment: 1 pack in a week   Substance and Sexual Activity    Alcohol use: Not Currently    Drug use: Yes     Types: Marijuana     Comment: daily    Sexual activity: Yes     Partners: Male     Birth control/protection: Condom       ?  Review of Systems:  Detailed review of systems was negative  except as noted below.  Endocrine (hormone): Negative  Cardiovascular ( heart/blood vessels): Negative  Fevers/weight loss (constitutional):Negative  Ear, nose, or throat : Negative  Respiratory (lung): Negative  Gastrointestinal (stomach): Negative  Genitourinary (bladder/kidneys): Negative  Musculoskeletal (muscle/bones): Negative  Skin: Negative  Psychiatric: Negative  Hematologic (blood): Negative    Examination:  She was well-appearing. She was alert and oriented. Attention span and concentration were normal. Speech, language, memory, and general knowledge were intact.      Her distance visual acuity with correction was 20/50  (PH 20/25)  in the right eye and 20/20  in the left eye.  Her near visual acuity with correction was J1 in the right eye and J7 in the left eye.     Visual fields were intact to confrontation. She perceived 8/8 OD and 8/8 OS Ishihara color plates correctly. Pupils were brisk to light without an afferent defect. Ocular ductions were full. Orthophoric in primary, right, and left gaze by cross cover. There was no nystagmus. Saccades and pursuits were normal. Lids were symmetric.     Optic discs appeared normal without swelling or pallor. Pupillary dilation was not necessary for visualization of the optic disc today.     Her intra ocular pressure was 12 in the right eye and 11 in the left eye at 1215 by applanation.     On the remainder of the neurologic examination, facial sensation was intact. Face was symmetric. Tongue was midline. Strength in the arms and legs was 5/5 without pronator drift. Reflexes were 2+ and symmetric. Finger to nose was intact without dysmetria. Sensation was normal to light touch.     Laboratories Reviewed:   n/a  ?  Neuroimaging Reviewed:     MRI reviewed no compression of optic nerves or chiasm, no abnormal expansion of pituitary gland from sella  ?  Ocular Imaging, Photos, Records Reviewed:     OCT RNFL Today 6/16/2022:   Right Eye - Average RNFL 114 all segments  normal   Left Eye - Average RNFL 111 all segments normal     Visual Field Test 24-2 OU Today 6/16/2022: Right Eye - fixation losses 0/10, false positives 14%, false negatives 0%, MD -0.48dB, Impression OD: full. Left Eye - fixation losses 1/11, false positives 4%, false negatives 0%, MD -0.43dB, Impression OS: few ST points.  ?  Impression:  Desiree Salas has history of HLD, elevated prolactin who presents for evaluation of blurred vision. She reports symptoms consistent with dry eye. Neuro-ophthalmologic examination was notable for good corrected visual acuities with monovision CL, full color vision, normal ocular motility and alignment. Funduscopic exam normal. OCT and HVF normal. No concern for any sellar extension of pituitary impacting vision. Needs optometrist for follow up and aggressive ocular lubrication for dry eye.     Plan:  1. AT [facility]  2. Optometry for routine and refraction    Follow-up:  I will see her in follow-up on an as needed basis  ?  ?  Visit Checklist (as applicable):  1. Status of new and prior symptoms discussed? yes  2. Neuroimaging reviewed/ ordered as appropriate? yes  3. Ocular imaging and photos reviewed/ ordered as appropriate? yes  4. Plan for work-up and treatment discussed with patient? yes  5. Potential medication side-effects and monitoring plan discussed? yes  6. Review of outside medical records was performed and pertinent details are summarized in the HPI above? n/a    Time spent on this encounter: 60 minutes. This includes face to face time and non-face to face time preparing to see the patient (eg, review of tests), obtaining and/or reviewing separately obtained history, documenting clinical information in the electronic or other health record, independently interpreting results and communicating results to the patient/family/caregiver, or care coordinator.      ZENY Ochoa  Neuro-Ophthalmology Consultant

## 2022-06-16 ENCOUNTER — CLINICAL SUPPORT (OUTPATIENT)
Dept: OPHTHALMOLOGY | Facility: CLINIC | Age: 47
End: 2022-06-16
Payer: COMMERCIAL

## 2022-06-16 ENCOUNTER — OFFICE VISIT (OUTPATIENT)
Dept: OPHTHALMOLOGY | Facility: CLINIC | Age: 47
End: 2022-06-16
Payer: COMMERCIAL

## 2022-06-16 ENCOUNTER — TELEPHONE (OUTPATIENT)
Dept: NEUROSURGERY | Facility: CLINIC | Age: 47
End: 2022-06-16
Payer: COMMERCIAL

## 2022-06-16 DIAGNOSIS — R79.89 ELEVATED PROLACTIN LEVEL: ICD-10-CM

## 2022-06-16 DIAGNOSIS — H53.15 VISUAL DISTORTIONS OF SHAPE AND SIZE: Primary | ICD-10-CM

## 2022-06-16 DIAGNOSIS — D49.7 PITUITARY TUMOR: ICD-10-CM

## 2022-06-16 PROCEDURE — 92133 CPTRZD OPH DX IMG PST SGM ON: CPT | Mod: S$GLB,,, | Performed by: STUDENT IN AN ORGANIZED HEALTH CARE EDUCATION/TRAINING PROGRAM

## 2022-06-16 PROCEDURE — 3044F PR MOST RECENT HEMOGLOBIN A1C LEVEL <7.0%: ICD-10-PCS | Mod: CPTII,S$GLB,, | Performed by: STUDENT IN AN ORGANIZED HEALTH CARE EDUCATION/TRAINING PROGRAM

## 2022-06-16 PROCEDURE — 99205 OFFICE O/P NEW HI 60 MIN: CPT | Mod: S$GLB,,, | Performed by: STUDENT IN AN ORGANIZED HEALTH CARE EDUCATION/TRAINING PROGRAM

## 2022-06-16 PROCEDURE — 3044F HG A1C LEVEL LT 7.0%: CPT | Mod: CPTII,S$GLB,, | Performed by: STUDENT IN AN ORGANIZED HEALTH CARE EDUCATION/TRAINING PROGRAM

## 2022-06-16 PROCEDURE — 1159F PR MEDICATION LIST DOCUMENTED IN MEDICAL RECORD: ICD-10-PCS | Mod: CPTII,S$GLB,, | Performed by: STUDENT IN AN ORGANIZED HEALTH CARE EDUCATION/TRAINING PROGRAM

## 2022-06-16 PROCEDURE — 99999 PR PBB SHADOW E&M-EST. PATIENT-LVL III: CPT | Mod: PBBFAC,,, | Performed by: STUDENT IN AN ORGANIZED HEALTH CARE EDUCATION/TRAINING PROGRAM

## 2022-06-16 PROCEDURE — 99205 PR OFFICE/OUTPT VISIT, NEW, LEVL V, 60-74 MIN: ICD-10-PCS | Mod: S$GLB,,, | Performed by: STUDENT IN AN ORGANIZED HEALTH CARE EDUCATION/TRAINING PROGRAM

## 2022-06-16 PROCEDURE — 92133 POSTERIOR SEGMENT OCT OPTIC NERVE(OCULAR COHERENCE TOMOGRAPHY) - OU - BOTH EYES: ICD-10-PCS | Mod: S$GLB,,, | Performed by: STUDENT IN AN ORGANIZED HEALTH CARE EDUCATION/TRAINING PROGRAM

## 2022-06-16 PROCEDURE — 1159F MED LIST DOCD IN RCRD: CPT | Mod: CPTII,S$GLB,, | Performed by: STUDENT IN AN ORGANIZED HEALTH CARE EDUCATION/TRAINING PROGRAM

## 2022-06-16 PROCEDURE — 99999 PR PBB SHADOW E&M-EST. PATIENT-LVL III: ICD-10-PCS | Mod: PBBFAC,,, | Performed by: STUDENT IN AN ORGANIZED HEALTH CARE EDUCATION/TRAINING PROGRAM

## 2022-06-16 PROCEDURE — 92083 EXTENDED VISUAL FIELD XM: CPT | Mod: S$GLB,,, | Performed by: STUDENT IN AN ORGANIZED HEALTH CARE EDUCATION/TRAINING PROGRAM

## 2022-06-16 PROCEDURE — 1160F PR REVIEW ALL MEDS BY PRESCRIBER/CLIN PHARMACIST DOCUMENTED: ICD-10-PCS | Mod: CPTII,S$GLB,, | Performed by: STUDENT IN AN ORGANIZED HEALTH CARE EDUCATION/TRAINING PROGRAM

## 2022-06-16 PROCEDURE — 1160F RVW MEDS BY RX/DR IN RCRD: CPT | Mod: CPTII,S$GLB,, | Performed by: STUDENT IN AN ORGANIZED HEALTH CARE EDUCATION/TRAINING PROGRAM

## 2022-06-16 PROCEDURE — 92083 HUMPHREY VISUAL FIELD - OU - BOTH EYES: ICD-10-PCS | Mod: S$GLB,,, | Performed by: STUDENT IN AN ORGANIZED HEALTH CARE EDUCATION/TRAINING PROGRAM

## 2022-06-16 NOTE — LETTER
[facility] - [address]th [address]  [address]  [place] [address]  Phone: [phone]  Fax: [phone]   June 16, 2022    Bonifacio Jnoes,   [address] [facility], 8th [address]  [facility] [address]    Patient: Desiree Salas   MR Number: [id number]   YOB: 1975   Date of Visit: 6/16/2022       Dear Dr. Jones :    Thank you for referring Desiree Salas to me for evaluation. Here is my assessment and plan of care:    Impression:  Desiree Salas has history of HLD, elevated prolactin who presents for evaluation of blurred vision. She reports symptoms consistent with dry eye. Neuro-ophthalmologic examination was notable for good corrected visual acuities with monovision CL, full color vision, normal ocular motility and alignment. Funduscopic exam normal. OCT and HVF normal. No concern for any sellar extension of pituitary impacting vision. Needs optometrist for follow up and aggressive ocular lubrication for dry eye.     Plan:  1. AT D  2. Optometry for routine and refraction    Follow-up:  I will see her in follow-up on an as needed basis    If you have questions, please do not hesitate to call me. I look forward to following Ms. Desiree Salas along with you.    Sincerely,        Olamide Mahmood MD       CC  Loreto Bruce MD

## 2022-06-21 ENCOUNTER — PATIENT MESSAGE (OUTPATIENT)
Dept: PRIMARY CARE CLINIC | Facility: CLINIC | Age: 47
End: 2022-06-21
Payer: COMMERCIAL

## 2022-06-21 DIAGNOSIS — H91.93 BILATERAL HEARING LOSS, UNSPECIFIED HEARING LOSS TYPE: Primary | ICD-10-CM

## 2022-06-22 ENCOUNTER — HOSPITAL ENCOUNTER (OUTPATIENT)
Dept: RADIOLOGY | Facility: HOSPITAL | Age: 47
Discharge: HOME | End: 2022-06-22
Attending: NEUROLOGICAL SURGERY
Payer: COMMERCIAL

## 2022-06-22 ENCOUNTER — TELEPHONE (OUTPATIENT)
Dept: PRIMARY CARE CLINIC | Facility: CLINIC | Age: 47
End: 2022-06-22
Payer: COMMERCIAL

## 2022-06-22 DIAGNOSIS — R92.8 ABNORMAL MAMMOGRAM: Primary | ICD-10-CM

## 2022-06-22 DIAGNOSIS — D49.7 PITUITARY TUMOR: ICD-10-CM

## 2022-06-22 DIAGNOSIS — R79.89 ELEVATED PROLACTIN LEVEL: ICD-10-CM

## 2022-06-22 PROCEDURE — 70546 MR ANGIOGRAPH HEAD W/O&W/DYE: CPT | Mod: TC

## 2022-06-22 PROCEDURE — 70546 MR ANGIOGRAPH HEAD W/O&W/DYE: CPT | Mod: 26,,, | Performed by: RADIOLOGY

## 2022-06-22 PROCEDURE — 25500020 PHARM REV CODE 255: Performed by: NEUROLOGICAL SURGERY

## 2022-06-22 PROCEDURE — A9585 GADOBUTROL INJECTION: HCPCS | Performed by: NEUROLOGICAL SURGERY

## 2022-06-22 PROCEDURE — 70546 MRA BRAIN WITH AND WITHOUT: ICD-10-PCS | Mod: 26,,, | Performed by: RADIOLOGY

## 2022-06-22 RX ORDER — GADOBUTROL 604.72 MG/ML
9 INJECTION INTRAVENOUS
Status: COMPLETED | OUTPATIENT
Start: 2022-06-22 | End: 2022-06-22

## 2022-06-22 RX ADMIN — GADOBUTROL 9 ML: 604.72 INJECTION INTRAVENOUS at 07:06

## 2022-06-22 NOTE — TELEPHONE ENCOUNTER
----- Message from Jassi Celestin sent at 6/22/2022 10:26 AM CDT -----  Contact: Pt [phone]  Suzy with [facility] is calling for orders Mammo Diagnostic with Ultrasound. Please fax to [phone]  Attn: Sarah Almonte @ [phone]  press 2 for scheduling.

## 2022-06-22 NOTE — TELEPHONE ENCOUNTER
I called the number, person did not know who Suzy was. Then call disconnected.  I placed orders for US and mammogram. Please fax.

## 2022-06-23 ENCOUNTER — LAB VISIT (OUTPATIENT)
Dept: LAB | Facility: HOSPITAL | Age: 47
End: 2022-06-23
Payer: COMMERCIAL

## 2022-06-23 ENCOUNTER — OFFICE VISIT (OUTPATIENT)
Dept: ENDOCRINOLOGY | Facility: CLINIC | Age: 47
End: 2022-06-23
Payer: COMMERCIAL

## 2022-06-23 VITALS
SYSTOLIC BLOOD PRESSURE: 120 MMHG | HEART RATE: 71 BPM | DIASTOLIC BLOOD PRESSURE: 78 MMHG | BODY MASS INDEX: 28.15 KG/M2 | WEIGHT: 175.13 LBS | HEIGHT: 66 IN | OXYGEN SATURATION: 98 %

## 2022-06-23 DIAGNOSIS — R79.89 ELEVATED PROLACTIN LEVEL: ICD-10-CM

## 2022-06-23 DIAGNOSIS — D49.7 PITUITARY TUMOR: ICD-10-CM

## 2022-06-23 LAB
PROLACTIN SERPL IA-MCNC: 14.1 NG/ML (ref 5.2–26.5)
T4 FREE SERPL-MCNC: 0.9 NG/DL (ref 0.71–1.51)
TSH SERPL DL<=0.005 MIU/L-ACNC: 1.06 UIU/ML (ref 0.4–4)

## 2022-06-23 PROCEDURE — 3074F PR MOST RECENT SYSTOLIC BLOOD PRESSURE < 130 MM HG: ICD-10-PCS | Mod: CPTII,S$GLB,, | Performed by: INTERNAL MEDICINE

## 2022-06-23 PROCEDURE — 3008F PR BODY MASS INDEX (BMI) DOCUMENTED: ICD-10-PCS | Mod: CPTII,S$GLB,, | Performed by: INTERNAL MEDICINE

## 2022-06-23 PROCEDURE — 3074F SYST BP LT 130 MM HG: CPT | Mod: CPTII,S$GLB,, | Performed by: INTERNAL MEDICINE

## 2022-06-23 PROCEDURE — 36415 COLL VENOUS BLD VENIPUNCTURE: CPT | Performed by: INTERNAL MEDICINE

## 2022-06-23 PROCEDURE — 99204 PR OFFICE/OUTPT VISIT, NEW, LEVL IV, 45-59 MIN: ICD-10-PCS | Mod: S$GLB,,, | Performed by: INTERNAL MEDICINE

## 2022-06-23 PROCEDURE — 3008F BODY MASS INDEX DOCD: CPT | Mod: CPTII,S$GLB,, | Performed by: INTERNAL MEDICINE

## 2022-06-23 PROCEDURE — 3044F PR MOST RECENT HEMOGLOBIN A1C LEVEL <7.0%: ICD-10-PCS | Mod: CPTII,S$GLB,, | Performed by: INTERNAL MEDICINE

## 2022-06-23 PROCEDURE — 1159F MED LIST DOCD IN RCRD: CPT | Mod: CPTII,S$GLB,, | Performed by: INTERNAL MEDICINE

## 2022-06-23 PROCEDURE — 99999 PR PBB SHADOW E&M-EST. PATIENT-LVL V: ICD-10-PCS | Mod: PBBFAC,,, | Performed by: INTERNAL MEDICINE

## 2022-06-23 PROCEDURE — 3078F DIAST BP <80 MM HG: CPT | Mod: CPTII,S$GLB,, | Performed by: INTERNAL MEDICINE

## 2022-06-23 PROCEDURE — 1159F PR MEDICATION LIST DOCUMENTED IN MEDICAL RECORD: ICD-10-PCS | Mod: CPTII,S$GLB,, | Performed by: INTERNAL MEDICINE

## 2022-06-23 PROCEDURE — 1160F PR REVIEW ALL MEDS BY PRESCRIBER/CLIN PHARMACIST DOCUMENTED: ICD-10-PCS | Mod: CPTII,S$GLB,, | Performed by: INTERNAL MEDICINE

## 2022-06-23 PROCEDURE — 84443 ASSAY THYROID STIM HORMONE: CPT | Performed by: INTERNAL MEDICINE

## 2022-06-23 PROCEDURE — 99204 OFFICE O/P NEW MOD 45 MIN: CPT | Mod: S$GLB,,, | Performed by: INTERNAL MEDICINE

## 2022-06-23 PROCEDURE — 84439 ASSAY OF FREE THYROXINE: CPT | Performed by: INTERNAL MEDICINE

## 2022-06-23 PROCEDURE — 3078F PR MOST RECENT DIASTOLIC BLOOD PRESSURE < 80 MM HG: ICD-10-PCS | Mod: CPTII,S$GLB,, | Performed by: INTERNAL MEDICINE

## 2022-06-23 PROCEDURE — 99999 PR PBB SHADOW E&M-EST. PATIENT-LVL V: CPT | Mod: PBBFAC,,, | Performed by: INTERNAL MEDICINE

## 2022-06-23 PROCEDURE — 1160F RVW MEDS BY RX/DR IN RCRD: CPT | Mod: CPTII,S$GLB,, | Performed by: INTERNAL MEDICINE

## 2022-06-23 PROCEDURE — 84146 ASSAY OF PROLACTIN: CPT | Performed by: INTERNAL MEDICINE

## 2022-06-23 PROCEDURE — 3044F HG A1C LEVEL LT 7.0%: CPT | Mod: CPTII,S$GLB,, | Performed by: INTERNAL MEDICINE

## 2022-06-23 RX ORDER — CABERGOLINE 0.5 MG/1
0.25 TABLET ORAL
COMMUNITY
Start: 2022-06-08 | End: 2022-10-06

## 2022-06-23 NOTE — PROGRESS NOTES
[facility] INITIAL VISIT  06/23/2022       Subjective:      Reason for referal: referred by Bonifacio Jones DO for evaluation and management of elevated prolactin.    HPI:   Desiree Salas is a 47 y.o. female with hx of ADHD, anxiety/depression, infertility who presents for evaluation of elevated prolactin.      She was seen by Dr. Jones on 6/14/22 at which time prior medical records were not available but she was noted to have elevated prolactin of 99 and 60 on infertility evaluation with pituitary MRI showing 2.5 mm hypoenhancement.  She has MRA schedule for possible vascular anomaly to be followed by Dr. Jones.     Initial presentation:   Noted to have elevated prolactin on fertility workup.  Has been seeing fertility specialist and had had several rounds of injection treatments with what patient describes as IUI but has not been able to become pregnant.      Imaging:      Outside MRI brain, dated 5/31/22:  1. Possible small hypointensity within inferior aspect of pituitary gland   2. Enlarged vessel emerging from ICA bifurcation area within lentiform area      Headache:    Some HA, feels stress related  Vision change:   Denies peripheral change  Formal Visual fields:   Timoteo 6/16/22 - normal HVF and OCT, f/u prn  Galactorrhea:    denies  Menses:    Regular every month, lasting 5-7 days, has not recently had ovulation testing     PCOS:    Hx of irregular cycles (constant bleeding) treated with OCP x 20 years.  Stopped OCP in her mid 30s.  After OCP stopped had regular cycles    The patient is not currently using any medication known to cause hyperprolactinemia such as: antipsychotics (risperidone, phenothiazines, haloperidol and butyrophenones),gastric motility drugs (metoclopramide and domperidone), or antihypertensives (methyldopa, reserpine, and verapamil).    No results found for: PROLACTIN    Current symptoms:    Thyroid:  [x]  fatigue [x]  Down 20lbs over the past year [x]  temp  intolerance (hot, chronic)      [x]  Loose stools and constipation []  skin/hair change [] palpitations []  tremor []  Denies    Lab Results   Component Value Date    TSH 0.861 05/10/2021    FREET4 0.95 09/25/2020         Growth Hormone Excess:  []  increase hand/foot size []  teeth spacing change    [x]  Denies    []  worse glycemic control/htn []  Carpal tunnel   [x]  Denies  No hyperhidrosis    Last IGF-1:   No results found for: SOMATMDN       Past Medical History:   Diagnosis Date    ADHD (attention deficit hyperactivity disorder), inattentive type     Anxiety     Asthma     exercise induced; no inhaler use    Depression     Pituitary tumor        Past Surgical History:   Procedure Laterality Date    CYST REMOVAL  2016    groin area    DILATION AND CURETTAGE OF UTERUS  1998    due to heavy/irregular menses    EXCISION OF GANGLION OF WRIST Left 12/18/2018    Procedure: EXCISION, GANGLION CYST, WRIST LEFT;  Surgeon: Walter Joyner Jr., MD;  Location: [facility];  Service: Plastics;  Laterality: Left;    MYOMECTOMY N/A 3/15/2019    Procedure: MYOMECTOMY OPEN;  Surgeon: Heena Castaneda MD;  Location: [facility];  Service: OB/GYN;  Laterality: N/A;  Dr. Becerra to asst       Review of patient's allergies indicates:   Allergen Reactions    Grass pollen Other (See Comments)    House dust Itching         Current Outpatient Medications:     buPROPion (WELLBUTRIN XL) 150 MG TB24 tablet, Take 150 mg by mouth every other day., Disp: , Rfl:     buPROPion (WELLBUTRIN XL) 300 MG 24 hr tablet, Take 300 mg by mouth every other day., Disp: , Rfl:     diphth,pertus,acell,,tetanus (BOOSTRIX TDAP) 2.5-8-5 Lf-mcg-Lf/0.5mL Syrg injection, Inject into the muscle., Disp: 0.5 mL, Rfl: 0    FLUoxetine (PROZAC) 10 MG capsule, TK ONE C PO  DAILY ON DAYS 14-28 OF CYCLE PLUS FIRST DAY OF MENSTRUATION, Disp: , Rfl: 2    FLUoxetine (PROZAC) 40 MG capsule, Take 80 mg by mouth once daily., Disp: , Rfl:     levomefolate calcium  "(DEPLIN ORAL), Take 15 mg by mouth once daily., Disp: , Rfl:     melatonin 5 mg Cap, Take 1 each by mouth every evening., Disp: , Rfl:     PNV no.95/ferrous fum/folic ac (PRENATAL MULTIVITAMINS ORAL), Take 1 tablet by mouth once daily., Disp: , Rfl:     progesterone (PROMETRIUM) 200 MG capsule, Take 1 capsule (200 mg total) by mouth nightly., Disp: 30 capsule, Rfl: 11  No current facility-administered medications for this visit.    Social History     Socioeconomic History    Marital status: Single   Occupational History     Comment: Works at [employer] -    Tobacco Use    Smoking status: Former Smoker     Years: 2.00     Types: Cigarettes     Quit date: 2017     Years since quittin.9    Smokeless tobacco: Never Used    Tobacco comment: 1 pack in a week   Substance and Sexual Activity    Alcohol use: Not Currently    Drug use: Yes     Types: Marijuana     Comment: daily    Sexual activity: Yes     Partners: Male     Birth control/protection: Condom       Family History   Problem Relation Age of Onset    Alzheimer's disease Maternal Grandmother     Diabetes Father     Hypertension Father     Diabetes Mother     Cancer Brother         unsure of the type    Migraines Sister     Breast cancer Neg Hx        ROS: see HPI     Objective:   Physical Exam   /78 (BP Location: Right arm, Patient Position: Sitting, BP Method: Large (Manual))   Pulse 71   Ht 5' 6" (1.676 m)   Wt 79.5 kg (175 lb 2.5 oz)   SpO2 98%   BMI 28.27 kg/m²   Wt Readings from Last 3 Encounters:   22 79.5 kg (175 lb 2.5 oz)   22 79.6 kg (175 lb 7.8 oz)   22 80.1 kg (176 lb 9.4 oz)   ]    Constitutional:  Pleasant,  in no acute distress.   HENT:   Eyes:     No scleral icterus.   Respiratory:   Effort normal   Neurological:  normal speech  Psych:  Normal mood and affect.      LABORATORY REVIEW:    Chemistry        Component Value Date/Time     2022 0835    K 4.1 2022 0835    CL " 106 06/07/2022 0835    CO2 25 06/07/2022 0835    BUN 8 06/07/2022 0835    CREATININE 1.0 06/07/2022 0835    GLU 82 06/07/2022 0835        Component Value Date/Time    CALCIUM 9.3 06/07/2022 0835    ALKPHOS 41 (L) 06/07/2022 0835    AST 22 06/07/2022 0835    ALT 24 06/07/2022 0835    BILITOT 0.7 06/07/2022 0835    ESTGFRAFRICA >60.0 06/07/2022 0835    EGFRNONAA >60.0 06/07/2022 0835          Lab Results   Component Value Date    TSH 0.861 05/10/2021    TSH 1.022 05/05/2021    TSH 1.508 09/25/2020    TSH 1.409 11/20/2018    FREET4 0.95 09/25/2020     06/07/2022     05/05/2021     09/25/2020     11/20/2018    K 4.1 06/07/2022    K 4.0 05/05/2021    K 4.4 09/25/2020    K 4.2 11/20/2018    CALCIUM 9.3 06/07/2022    CALCIUM 9.5 05/05/2021    CALCIUM 8.8 09/25/2020    CALCIUM 9.0 11/20/2018    ALBUMIN 3.9 06/07/2022    ALBUMIN 3.8 05/05/2021    ALBUMIN 4.2 09/25/2020    ALBUMIN 3.8 11/20/2018    ESTGFRAFRICA >60.0 06/07/2022    ESTGFRAFRICA >60.0 05/05/2021    ESTGFRAFRICA >60 09/25/2020    ESTGFRAFRICA >60 11/20/2018    EGFRNONAA >60.0 06/07/2022    EGFRNONAA >60.0 05/05/2021    EGFRNONAA >60 09/25/2020    EGFRNONAA >60 11/20/2018         Assessment/Plan:     Problem List Items Addressed This Visit        1 - High    Elevated prolactin level     Microadenoma that may represent microprolactinoma.  She is getting regular menstrual cycles but seeking pregnancy so recommed starting cabergoline 0.25 mg twice weekly which has been prescribed by her gyn.      Will check baseline prolactin today, TSH, and IGF-1 to r/o co secretion of GH.    Will check prolactin in 1 month(s) and if normal can proceed with next round of fertility treatment.      Discussed that if she is no longer trying for pregnancy she does not need to continue cabergoline as she is getting regular menstrual cycles and has a microprolactinoma.      She was instructed to stop cabergoline if pregnant and let me know           Relevant  Orders    Follicle Stimulating Hormone    Insulin-Like Growth Factor    Prolactin    TSH (Completed)    T4, Free (Completed)    Prolactin (Completed)       2     Pituitary tumor     Small hypoenhancing lesion on MRI likely microprolactinoma.  Does not need repeat imaging unless significant rise in prolactin            Relevant Orders    Follicle Stimulating Hormone    Insulin-Like Growth Factor    Prolactin    TSH (Completed)    T4, Free (Completed)    Prolactin (Completed)        Patient Instructions       Start cabergoline 0.25 mg (1/2 tablet) twice a week.  It is best if you take it at bedtime with a small healthy snack so you can sleep through any possible side effects.  I recommend using a pill box for this to make sure you are not missing any doses.  If later in the week you notice that you have missed a pill you can always make it up by taking it on a different day.  The important thing is that you take the same amount each week.      Sometimes cabergoline can cause a runny nose, dizziness, and upset stomach but it is usually not a problem if you take it at night with some food.  Please let me know if you have any bad side effects or if you are having trouble getting the medication.      At much higher doses this medication has been used to treat other conditions like Parkinson's disease and at those high doses there have been some reports of behavior changes like excessive gambling or shopping.  We almost never see that happen with the small doses we use to treat high prolactin but if you notice any of these changes please let me know right away.          Lab today  Prolactin in 1 month(s)   RTC 6 month(s) virtual      Loreto Bruce MD

## 2022-06-23 NOTE — ASSESSMENT & PLAN NOTE
Microadenoma that may represent microprolactinoma.  She is getting regular menstrual cycles but seeking pregnancy so recommed starting cabergoline 0.25 mg twice weekly which has been prescribed by her gyn.      Will check baseline prolactin today, TSH, and IGF-1 to r/o co secretion of GH.    Will check prolactin in 1 month(s) and if normal can proceed with next round of fertility treatment.      Discussed that if she is no longer trying for pregnancy she does not need to continue cabergoline as she is getting regular menstrual cycles and has a microprolactinoma.      She was instructed to stop cabergoline if pregnant and let me know

## 2022-06-23 NOTE — ASSESSMENT & PLAN NOTE
Small hypoenhancing lesion on MRI likely microprolactinoma.  Does not need repeat imaging unless significant rise in prolactin

## 2022-06-23 NOTE — PATIENT INSTRUCTIONS
Start cabergoline 0.25 mg (1/2 tablet) twice a week.  It is best if you take it at bedtime with a small healthy snack so you can sleep through any possible side effects.  I recommend using a pill box for this to make sure you are not missing any doses.  If later in the week you notice that you have missed a pill you can always make it up by taking it on a different day.  The important thing is that you take the same amount each week.      Sometimes cabergoline can cause a runny nose, dizziness, and upset stomach but it is usually not a problem if you take it at night with some food.  Please let me know if you have any bad side effects or if you are having trouble getting the medication.      At much higher doses this medication has been used to treat other conditions like Parkinson's disease and at those high doses there have been some reports of behavior changes like excessive gambling or shopping.  We almost never see that happen with the small doses we use to treat high prolactin but if you notice any of these changes please let me know right away.

## 2022-06-28 ENCOUNTER — PATIENT OUTREACH (OUTPATIENT)
Dept: ADMINISTRATIVE | Facility: HOSPITAL | Age: 47
End: 2022-06-28
Payer: COMMERCIAL

## 2022-06-28 LAB
BCS RECOMMENDATION EXT: NORMAL
BCS RECOMMENDATION EXT: NORMAL

## 2022-06-28 NOTE — PROGRESS NOTES
Baseline prolactin and thyroid labs normal so patient advised to hold off on taking cabergoline and will instead repeat prolactin level in 1 month as we had originally planned and if elevated at that time will start cabergoline.      Please resend medical record request to fertility [place] so that we can get her most recent prolactin levels

## 2022-06-28 NOTE — PROGRESS NOTES
Patient due for the following    Colorectal Cancer Screening       Received mammogram records.  Scanned to media.  updated    Immunizations: reviewed and updated  Care Everywhere: triggered  Care Teams: up to date  Outreach: none needed

## 2022-08-12 ENCOUNTER — HOSPITAL ENCOUNTER (EMERGENCY)
Facility: OTHER | Age: 47
Discharge: HOME | End: 2022-08-12
Attending: EMERGENCY MEDICINE
Payer: COMMERCIAL

## 2022-08-12 ENCOUNTER — NURSE TRIAGE (OUTPATIENT)
Dept: ADMINISTRATIVE | Facility: CLINIC | Age: 47
End: 2022-08-12
Payer: COMMERCIAL

## 2022-08-12 VITALS
HEIGHT: 64 IN | RESPIRATION RATE: 16 BRPM | SYSTOLIC BLOOD PRESSURE: 112 MMHG | OXYGEN SATURATION: 99 % | DIASTOLIC BLOOD PRESSURE: 60 MMHG | TEMPERATURE: 99 F | BODY MASS INDEX: 29.88 KG/M2 | HEART RATE: 68 BPM | WEIGHT: 175 LBS

## 2022-08-12 DIAGNOSIS — R19.7 ABDOMINAL PAIN, VOMITING, AND DIARRHEA: Primary | ICD-10-CM

## 2022-08-12 DIAGNOSIS — R10.9 ABDOMINAL PAIN, VOMITING, AND DIARRHEA: Primary | ICD-10-CM

## 2022-08-12 DIAGNOSIS — R11.10 ABDOMINAL PAIN, VOMITING, AND DIARRHEA: Primary | ICD-10-CM

## 2022-08-12 DIAGNOSIS — K52.9 GASTROENTERITIS: ICD-10-CM

## 2022-08-12 LAB
ALBUMIN SERPL BCP-MCNC: 3.9 G/DL (ref 3.5–5.2)
ALP SERPL-CCNC: 43 U/L (ref 55–135)
ALT SERPL W/O P-5'-P-CCNC: 33 U/L (ref 10–44)
ANION GAP SERPL CALC-SCNC: 10 MMOL/L (ref 8–16)
AST SERPL-CCNC: 31 U/L (ref 10–40)
B-HCG UR QL: NEGATIVE
BACTERIA #/AREA URNS HPF: ABNORMAL /HPF
BASOPHILS # BLD AUTO: 0.02 K/UL (ref 0–0.2)
BASOPHILS NFR BLD: 0.2 % (ref 0–1.9)
BILIRUB SERPL-MCNC: 0.2 MG/DL (ref 0.1–1)
BILIRUB UR QL STRIP: NEGATIVE
BUN SERPL-MCNC: 11 MG/DL (ref 6–20)
CALCIUM SERPL-MCNC: 9.1 MG/DL (ref 8.7–10.5)
CHLORIDE SERPL-SCNC: 107 MMOL/L (ref 95–110)
CLARITY UR: CLEAR
CO2 SERPL-SCNC: 22 MMOL/L (ref 23–29)
COLOR UR: YELLOW
CREAT SERPL-MCNC: 1.1 MG/DL (ref 0.5–1.4)
CTP QC/QA: YES
DIFFERENTIAL METHOD: ABNORMAL
EOSINOPHIL # BLD AUTO: 0.1 K/UL (ref 0–0.5)
EOSINOPHIL NFR BLD: 0.9 % (ref 0–8)
ERYTHROCYTE [DISTWIDTH] IN BLOOD BY AUTOMATED COUNT: 14.2 % (ref 11.5–14.5)
EST. GFR  (NO RACE VARIABLE): >60 ML/MIN/1.73 M^2
GLUCOSE SERPL-MCNC: 109 MG/DL (ref 70–110)
GLUCOSE UR QL STRIP: NEGATIVE
HCT VFR BLD AUTO: 37.5 % (ref 37–48.5)
HGB BLD-MCNC: 12.1 G/DL (ref 12–16)
HGB UR QL STRIP: ABNORMAL
IMM GRANULOCYTES # BLD AUTO: 0.05 K/UL (ref 0–0.04)
IMM GRANULOCYTES NFR BLD AUTO: 0.5 % (ref 0–0.5)
KETONES UR QL STRIP: NEGATIVE
LEUKOCYTE ESTERASE UR QL STRIP: ABNORMAL
LIPASE SERPL-CCNC: 20 U/L (ref 4–60)
LYMPHOCYTES # BLD AUTO: 1.2 K/UL (ref 1–4.8)
LYMPHOCYTES NFR BLD: 11.9 % (ref 18–48)
MCH RBC QN AUTO: 27.6 PG (ref 27–31)
MCHC RBC AUTO-ENTMCNC: 32.3 G/DL (ref 32–36)
MCV RBC AUTO: 85 FL (ref 82–98)
MICROSCOPIC COMMENT: ABNORMAL
MONOCYTES # BLD AUTO: 0.4 K/UL (ref 0.3–1)
MONOCYTES NFR BLD: 4.3 % (ref 4–15)
NEUTROPHILS # BLD AUTO: 8.5 K/UL (ref 1.8–7.7)
NEUTROPHILS NFR BLD: 82.2 % (ref 38–73)
NITRITE UR QL STRIP: NEGATIVE
NRBC BLD-RTO: 0 /100 WBC
PH UR STRIP: 6 [PH] (ref 5–8)
PLATELET # BLD AUTO: 261 K/UL (ref 150–450)
PMV BLD AUTO: 9.9 FL (ref 9.2–12.9)
POTASSIUM SERPL-SCNC: 4.9 MMOL/L (ref 3.5–5.1)
PROT SERPL-MCNC: 7 G/DL (ref 6–8.4)
PROT UR QL STRIP: NEGATIVE
RBC # BLD AUTO: 4.39 M/UL (ref 4–5.4)
RBC #/AREA URNS HPF: 5 /HPF (ref 0–4)
SODIUM SERPL-SCNC: 139 MMOL/L (ref 136–145)
SP GR UR STRIP: >=1.03 (ref 1–1.03)
URN SPEC COLLECT METH UR: ABNORMAL
UROBILINOGEN UR STRIP-ACNC: NEGATIVE EU/DL
WBC # BLD AUTO: 10.27 K/UL (ref 3.9–12.7)
WBC #/AREA URNS HPF: 2 /HPF (ref 0–5)

## 2022-08-12 PROCEDURE — 81000 URINALYSIS NONAUTO W/SCOPE: CPT | Performed by: EMERGENCY MEDICINE

## 2022-08-12 PROCEDURE — 80053 COMPREHEN METABOLIC PANEL: CPT | Performed by: EMERGENCY MEDICINE

## 2022-08-12 PROCEDURE — 25000003 PHARM REV CODE 250: Performed by: EMERGENCY MEDICINE

## 2022-08-12 PROCEDURE — 85025 COMPLETE CBC W/AUTO DIFF WBC: CPT | Performed by: EMERGENCY MEDICINE

## 2022-08-12 PROCEDURE — 96374 THER/PROPH/DIAG INJ IV PUSH: CPT

## 2022-08-12 PROCEDURE — 99284 EMERGENCY DEPT VISIT MOD MDM: CPT | Mod: 25

## 2022-08-12 PROCEDURE — 96372 THER/PROPH/DIAG INJ SC/IM: CPT | Mod: 59 | Performed by: EMERGENCY MEDICINE

## 2022-08-12 PROCEDURE — 83690 ASSAY OF LIPASE: CPT | Performed by: EMERGENCY MEDICINE

## 2022-08-12 PROCEDURE — 96361 HYDRATE IV INFUSION ADD-ON: CPT

## 2022-08-12 PROCEDURE — 63600175 PHARM REV CODE 636 W HCPCS: Performed by: EMERGENCY MEDICINE

## 2022-08-12 PROCEDURE — 81025 URINE PREGNANCY TEST: CPT | Performed by: EMERGENCY MEDICINE

## 2022-08-12 RX ORDER — DICYCLOMINE HYDROCHLORIDE 10 MG/ML
20 INJECTION INTRAMUSCULAR
Status: COMPLETED | OUTPATIENT
Start: 2022-08-12 | End: 2022-08-12

## 2022-08-12 RX ORDER — IBUPROFEN 600 MG/1
600 TABLET ORAL
Status: COMPLETED | OUTPATIENT
Start: 2022-08-12 | End: 2022-08-12

## 2022-08-12 RX ORDER — ONDANSETRON 2 MG/ML
4 INJECTION INTRAMUSCULAR; INTRAVENOUS
Status: COMPLETED | OUTPATIENT
Start: 2022-08-12 | End: 2022-08-12

## 2022-08-12 RX ORDER — ONDANSETRON 4 MG/1
4 TABLET, ORALLY DISINTEGRATING ORAL EVERY 8 HOURS PRN
Qty: 12 TABLET | Refills: 0 | Status: SHIPPED | OUTPATIENT
Start: 2022-08-12 | End: 2022-10-06

## 2022-08-12 RX ADMIN — DICYCLOMINE HYDROCHLORIDE 20 MG: 10 INJECTION, SOLUTION INTRAMUSCULAR at 09:08

## 2022-08-12 RX ADMIN — IBUPROFEN 600 MG: 600 TABLET ORAL at 12:08

## 2022-08-12 RX ADMIN — SODIUM CHLORIDE 1000 ML: 0.9 INJECTION, SOLUTION INTRAVENOUS at 08:08

## 2022-08-12 RX ADMIN — ONDANSETRON 4 MG: 2 INJECTION INTRAMUSCULAR; INTRAVENOUS at 09:08

## 2022-08-12 NOTE — TELEPHONE ENCOUNTER
"Mrs. Salas is calling on her daughter's behalf, she began with vomiting and diarrhea this am.  She estimates 3 episodes of vomiting and 3 episodes of diarrhea, she feels very weak, unable to stand up without difficulty, and the inside of her mouth is dry, and she feels like her heart is racing.  She does not believe she is pregnant, but is actively trying to become pregnant, started on tamoxifen, first dose last night.  I advised she maybe exhibiting signs of shock, and advised they call 911 to bring her to the ED.  Her mother verbalized understanding.  Reason for Disposition   Shock suspected (e.g., cold/pale/clammy skin, too weak to stand, low BP, rapid pulse)    Answer Assessment - Initial Assessment Questions  1. VOMITING SEVERITY: "How many times have you vomited in the past 24 hours?"      - MILD:  1 - 2 times/day     - MODERATE: 3 - 5 times/day, decreased oral intake without significant weight loss or symptoms of dehydration     - SEVERE: 6 or more times/day, vomits everything or nearly everything, with significant weight loss, symptoms of dehydration       3 times  2. ONSET: "When did the vomiting begin?"       Began this am  3. FLUIDS: "What fluids or food have you vomited up today?" "Have you been able to keep any fluids down?"      Hasn't tried to drink anything this am  4. ABDOMINAL PAIN: "Are your having any abdominal pain?" If yes : "How bad is it and what does it feel like?" (e.g., crampy, dull, intermittent, constant)       10/10 constant  5. DIARRHEA: "Is there any diarrhea?" If so, ask: "How many times today?"       Began at 0430, estimates 3 times  6. CONTACTS: "Is there anyone else in the family with the same symptoms?"       no  7. CAUSE: "What do you think is causing your vomiting?"      Unsure, could be from mew rx tamoxifen started last night  8. HYDRATION STATUS: "Any signs of dehydration?" (e.g., dry mouth [not only dry lips], too weak to stand) "When did you last urinate?"      Urinated " "once this am, dry mouth, feels too weak to stand up  9. OTHER SYMPTOMS: "Do you have any other symptoms?" (e.g., fever, headache, vertigo, vomiting blood or coffee grounds)      Weakness, headache  10. PREGNANCY: "Is there any chance you are pregnant?" "When was your last menstrual period?"        Does not believe she is pregnant, but us actively trying to become pregnant and on medication to help fertilization    Protocols used: ST VOMITING-A-AH    "

## 2022-08-12 NOTE — ED NOTES
Pt c.o lower abd pain with vomiting and diarrhea onset 430AM. Pt states she is currently on her cycle. Pt is in process of trying to get pregnant and took new med for ovulation last night around 8. Pt took jayme med last month with no issues.   AAO x 3 nadn skin w.d   Vomit x 3 dirrhea x 3+  Tongue slightly dry.

## 2022-08-12 NOTE — ED PROVIDER NOTES
Encounter Date: 8/12/2022    SCRIBE #1 NOTE: Karoline PENA, germaine scribing for, and in the presence of, Conor Nieto MD.       History     Chief Complaint   Patient presents with    Abdominal Pain    Vomiting    Nausea     C/o lower mid abd pain 10/10, n/v/d that began this morning 0400am. Last vomited approx 20 min PTA. Denies any red tint to v/d. Stated started new medication (Tamoxifen 20 mg) yesterday and also on her menstrual cycle currently. VSS.     Time seen by provider: 8:30 AM    Desiree Salas is a 47 y.o. female, with a PMHx of anxiety and depression, who presents to the ED with constant 10/10 lower mid abdominal pain that began four hours ago. The patient reports the pain woke her from sleep and describes it as a cramping feeling. She states that she began to experience vomiting and diarrhea shortly after her pain began. The patient notes that she felt well last night, did not have anything unusual to eat, and is generally healthy. She report that she took a dose of her fertility medication for the first time this cycle. She notes that she has taken this medication before with no issue. She reports she also had a fertility injection yesterday, which she has had before as well.. She denies hematemesis. She denies fever, cough, or congestion. This is the extent of the patient's complaints today in the Emergency Department.     The history is provided by the patient.     Review of patient's allergies indicates:   Allergen Reactions    Grass pollen Other (See Comments)    House dust Itching     Past Medical History:   Diagnosis Date    ADHD (attention deficit hyperactivity disorder), inattentive type     Anxiety     Asthma     exercise induced; no inhaler use    Depression     Pituitary tumor      Past Surgical History:   Procedure Laterality Date    CYST REMOVAL  2016    groin area    DILATION AND CURETTAGE OF UTERUS  1998    due to heavy/irregular menses    EXCISION OF GANGLION OF  WRIST Left 2018    Procedure: EXCISION, GANGLION CYST, WRIST LEFT;  Surgeon: Walter Joyner Jr., MD;  Location: [facility] OR;  Service: Plastics;  Laterality: Left;    MYOMECTOMY N/A 3/15/2019    Procedure: MYOMECTOMY OPEN;  Surgeon: Heena Castaneda MD;  Location: [facility] OR;  Service: OB/GYN;  Laterality: N/A;  Dr. Becerra to asst     Family History   Problem Relation Age of Onset    Alzheimer's disease Maternal Grandmother     Diabetes Father     Hypertension Father     Diabetes Mother     Cancer Brother         unsure of the type    Migraines Sister     Breast cancer Neg Hx      Social History     Tobacco Use    Smoking status: Former Smoker     Years: 2.00     Types: Cigarettes     Quit date: 2017     Years since quittin.1    Smokeless tobacco: Never Used    Tobacco comment: 1 pack in a week   Substance Use Topics    Alcohol use: Not Currently    Drug use: Yes     Types: Marijuana     Comment: daily     Review of Systems   Constitutional: Negative for fever.   HENT: Negative for congestion and sore throat.    Respiratory: Negative for cough and shortness of breath.    Cardiovascular: Negative for chest pain.   Gastrointestinal: Positive for diarrhea, nausea and vomiting.        Negative for hematemesis.   Genitourinary: Negative for dysuria.   Musculoskeletal: Negative for back pain.   Skin: Negative for rash.   Neurological: Negative for weakness.   Hematological: Does not bruise/bleed easily.       Physical Exam     Initial Vitals [22 0809]   BP Pulse Resp Temp SpO2   (!) 141/67 62 18 97.8 °F (36.6 °C) 99 %      MAP       --         Physical Exam    Nursing note and vitals reviewed.  Constitutional: She appears well-developed and well-nourished. She is not diaphoretic.   Mild distress.   HENT:   Head: Normocephalic and atraumatic.   Dry mucous membranes.   Eyes: Conjunctivae are normal.   Neck: Neck supple.   Cardiovascular: Normal rate, regular rhythm, normal heart sounds and  intact distal pulses.   No murmur heard.  Pulmonary/Chest: Breath sounds normal. No respiratory distress. She has no wheezes. She has no rhonchi. She has no rales.   Abdominal: Abdomen is soft. There is no abdominal tenderness.   Minimal periumbilical tenderness. There is no rebound and no guarding.   Musculoskeletal:         General: No edema.      Cervical back: Neck supple.     Neurological: She is alert and oriented to person, place, and time.   Skin: Skin is warm and dry.         ED Course   Procedures  Labs Reviewed   CBC W/ AUTO DIFFERENTIAL - Abnormal; Notable for the following components:       Result Value    Gran # (ANC) 8.5 (*)     Immature Grans (Abs) 0.05 (*)     Gran % 82.2 (*)     Lymph % 11.9 (*)     All other components within normal limits   COMPREHENSIVE METABOLIC PANEL - Abnormal; Notable for the following components:    CO2 22 (*)     Alkaline Phosphatase 43 (*)     All other components within normal limits   URINALYSIS, REFLEX TO URINE CULTURE - Abnormal; Notable for the following components:    Specific Gravity, UA >=1.030 (*)     Occult Blood UA 3+ (*)     Leukocytes, UA Trace (*)     All other components within normal limits    Narrative:     Specimen Source->Urine   URINALYSIS MICROSCOPIC - Abnormal; Notable for the following components:    RBC, UA 5 (*)     All other components within normal limits    Narrative:     Specimen Source->Urine   LIPASE   POCT URINE PREGNANCY          Imaging Results    None          Medications   sodium chloride 0.9% bolus 1,000 mL (0 mLs Intravenous Stopped 8/12/22 1008)   ondansetron injection 4 mg (4 mg Intravenous Given 8/12/22 0902)   dicyclomine injection 20 mg (20 mg Intramuscular Given 8/12/22 0904)   ibuprofen tablet 600 mg (600 mg Oral Given 8/12/22 1218)     Medical Decision Making:   History:   Old Medical Records: I decided to obtain old medical records.  Initial Assessment:       47-year-old female with history of anxiety/depression, undergoing  fertility treatment presents to the ED with acute onset abdominal cramping, vomiting and diarrhea that woke her up at 4:00 a.m..  She was otherwise at normal baseline before going to sleep, though started taking tamoxifen again 1st dose last night.  No suspected food poisoning or known sick contacts, no associated URI symptoms or fevers, no blood in stool or emesis.  She has taken tamoxifen in the past few months without issue, and also had a fertility shot Gonal-F recently as well.  On arrival patient in distress due to abdominal discomfort and nausea, with minimal periumbilical tenderness but no other acute exam findings other than signs of mild dehydration.  Differential includes viral gastroenteritis, medication side effect, will also evaluate for GUY/electrolyte abnormality; low suspicion for intra-abdominal infection such as acute cholecystitis.      Labs reassuring with normal WBC, no elevated LFTs/lipase, no other acute findings.  On reassessment patient feels better after IVF/Bentyl/Zofran, and a resting comfortably and able to drink small amounts of liquids.  No abdominal tenderness on reassessment, no sign of intra-abdominal infection or complication from fertility treatments.  No episodes of diarrhea or emesis while monitored in ED.  I discussed with patient further ED observation to ensure resolution of symptoms, but she was comfortable with trial of discharge with Zofran p.r.n. and p.o. hydration, return precautions for any worsening.  She will discuss whether to continue tamoxifen with her fertility doctor.      Clinical Tests:   Lab Tests: Ordered and Reviewed          Scribe Attestation:   Scribe #1: I performed the above scribed service and the documentation accurately describes the services I performed. I attest to the accuracy of the note.              I, Dr. Conor Nieto, personally performed the services described in this documentation. All medical record entries made by the scribe were at  my direction and in my presence.  I have reviewed the chart and agree that the record reflects my personal performance and is accurate and complete. Conor Nieto MD.       Clinical Impression:   Final diagnoses:  [R10.9, R11.10, R19.7] Abdominal pain, vomiting, and diarrhea (Primary)  [K52.9] Gastroenteritis          ED Disposition Condition    Discharge Stable        ED Prescriptions     Medication Sig Dispense Start Date End Date Auth. Provider    ondansetron (ZOFRAN-ODT) 4 MG TbDL Take 1 tablet (4 mg total) by mouth every 8 (eight) hours as needed (Nausea). 12 tablet 8/12/2022  Conor Nieto MD        Follow-up Information     Follow up With Specialties Details Why Contact Info    [facility] - Emergency Dept Emergency Medicine Go to  If symptoms worsen [address] [facility] [address]  [phone]    Fertility specialist  Call today             Conor Nieto MD  08/12/22 6036

## 2022-08-26 ENCOUNTER — OFFICE VISIT (OUTPATIENT)
Dept: OTOLARYNGOLOGY | Facility: CLINIC | Age: 47
End: 2022-08-26
Payer: COMMERCIAL

## 2022-08-26 ENCOUNTER — CLINICAL SUPPORT (OUTPATIENT)
Dept: AUDIOLOGY | Facility: CLINIC | Age: 47
End: 2022-08-26
Payer: COMMERCIAL

## 2022-08-26 ENCOUNTER — TELEPHONE (OUTPATIENT)
Dept: ENDOSCOPY | Facility: HOSPITAL | Age: 47
End: 2022-08-26
Payer: COMMERCIAL

## 2022-08-26 VITALS — BODY MASS INDEX: 30.04 KG/M2 | WEIGHT: 175 LBS

## 2022-08-26 DIAGNOSIS — H90.3 SENSORINEURAL HEARING LOSS OF BOTH EARS: ICD-10-CM

## 2022-08-26 DIAGNOSIS — H90.3 SENSORINEURAL HEARING LOSS (SNHL) OF BOTH EARS: ICD-10-CM

## 2022-08-26 PROCEDURE — 3008F BODY MASS INDEX DOCD: CPT | Mod: CPTII,S$GLB,, | Performed by: OTOLARYNGOLOGY

## 2022-08-26 PROCEDURE — 99203 PR OFFICE/OUTPT VISIT, NEW, LEVL III, 30-44 MIN: ICD-10-PCS | Mod: S$GLB,,, | Performed by: OTOLARYNGOLOGY

## 2022-08-26 PROCEDURE — 3044F HG A1C LEVEL LT 7.0%: CPT | Mod: CPTII,S$GLB,, | Performed by: OTOLARYNGOLOGY

## 2022-08-26 PROCEDURE — 99999 PR PBB SHADOW E&M-EST. PATIENT-LVL I: CPT | Mod: PBBFAC,,, | Performed by: AUDIOLOGIST

## 2022-08-26 PROCEDURE — 99999 PR PBB SHADOW E&M-EST. PATIENT-LVL III: CPT | Mod: PBBFAC,,, | Performed by: OTOLARYNGOLOGY

## 2022-08-26 PROCEDURE — 99999 PR PBB SHADOW E&M-EST. PATIENT-LVL III: ICD-10-PCS | Mod: PBBFAC,,, | Performed by: OTOLARYNGOLOGY

## 2022-08-26 PROCEDURE — 92557 COMPREHENSIVE HEARING TEST: CPT | Mod: S$GLB,,, | Performed by: AUDIOLOGIST

## 2022-08-26 PROCEDURE — 92567 PR TYMPA2METRY: ICD-10-PCS | Mod: S$GLB,,, | Performed by: AUDIOLOGIST

## 2022-08-26 PROCEDURE — 1159F PR MEDICATION LIST DOCUMENTED IN MEDICAL RECORD: ICD-10-PCS | Mod: CPTII,S$GLB,, | Performed by: OTOLARYNGOLOGY

## 2022-08-26 PROCEDURE — 99203 OFFICE O/P NEW LOW 30 MIN: CPT | Mod: S$GLB,,, | Performed by: OTOLARYNGOLOGY

## 2022-08-26 PROCEDURE — 99999 PR PBB SHADOW E&M-EST. PATIENT-LVL I: ICD-10-PCS | Mod: PBBFAC,,, | Performed by: AUDIOLOGIST

## 2022-08-26 PROCEDURE — 92557 PR COMPREHENSIVE HEARING TEST: ICD-10-PCS | Mod: S$GLB,,, | Performed by: AUDIOLOGIST

## 2022-08-26 PROCEDURE — 3008F PR BODY MASS INDEX (BMI) DOCUMENTED: ICD-10-PCS | Mod: CPTII,S$GLB,, | Performed by: OTOLARYNGOLOGY

## 2022-08-26 PROCEDURE — 3044F PR MOST RECENT HEMOGLOBIN A1C LEVEL <7.0%: ICD-10-PCS | Mod: CPTII,S$GLB,, | Performed by: OTOLARYNGOLOGY

## 2022-08-26 PROCEDURE — 92567 TYMPANOMETRY: CPT | Mod: S$GLB,,, | Performed by: AUDIOLOGIST

## 2022-08-26 PROCEDURE — 1159F MED LIST DOCD IN RCRD: CPT | Mod: CPTII,S$GLB,, | Performed by: OTOLARYNGOLOGY

## 2022-08-26 NOTE — PROGRESS NOTES
"  Otology/Neurotology History and Physical     CC: hearing loss    HPI:  Desiree Salas is a 47 y.o. female who was referred to me by Dr. Anahy Olsen in consultation for hearing loss. She relates a several year history of worsening bilateral hearing loss. She states she has been saying "what?" frequently to her family. She denies any history of noise exposure, ear surgeries or family history of hearing loss. She denies significant tinnitus. She does report some dysequilibrium but does not report any room-spinning vertigo.       Past Medical History  She has a past medical history of ADHD (attention deficit hyperactivity disorder), inattentive type, Anxiety, Asthma, Depression, and Pituitary tumor.    Past Surgical History  She has a past surgical history that includes Cyst Removal (2016); Dilation and curettage of uterus (1998); Excision of ganglion of wrist (Left, 12/18/2018); and Myomectomy (N/A, 3/15/2019).    Family History  Her family history includes Alzheimer's disease in her maternal grandmother; Cancer in her brother; Diabetes in her father and mother; Hypertension in her father; Migraines in her sister.    Social History  She reports that she quit smoking about 5 years ago. Her smoking use included cigarettes. She quit after 2.00 years of use. She has never used smokeless tobacco. She reports previous alcohol use. She reports current drug use. Drug: Marijuana.    Allergies  She is allergic to grass pollen and house dust.    Medications  She has a current medication list which includes the following prescription(s): bupropion, bupropion, fluoxetine, fluoxetine, levomefolate calcium, pnv no.95/ferrous fum/folic ac, cabergoline, boostrix tdap, melatonin, ondansetron, and progesterone.    Review of Systems  Answers for HPI/ROS submitted by the patient on 8/24/2022  Fatigue (Tiredness)?: Yes  Snoring?: Yes  Urinating too frequently?: Yes  Feeling depressed?: Yes         Objective:     Wt 79.4 kg (175 " lb)   BMI 30.04 kg/m²    Physical Exam  Constitutional: Well appearing/communicating. Voice clear. NAD.  Eyes: EOM I Bilaterally  Head/Face: Normocephalic.  House Brackmann I Bilaterally.  Right Ear: External Auditory Canal WNL,TM w/o masses/lesions/perforations.  Auricle WNL.  Left Ear: External Auditory Canal WNL,TM w/o masses/lesions/perforations. Auricle WNL.  Neuro/Psychiatric: AOx3.  Normal mood and affect.   Respiratory: Normal respiratory effort, no stridor/stertor, no retractions noted.      Procedure    None        Data Reviewed      Bilateral mild-moderate SNHL         Assessment:     1. Sensorineural hearing loss (SNHL) of both ears         Plan:   -- no clear etiology, likely multifactorial.   -- discussed HA with pt, she would like to proceed  -- hearing preservation strategies  -- yearly audiogram      RTC prn

## 2022-08-26 NOTE — PROGRESS NOTES
Ms. Desiree Salas was seen in the clinic today for an audiological evaluation.  Ms. Salas reported bilateral hearing loss.    Audiological testing revealed normal hearing sloping to a mild to moderate sensorineural hearing loss for the right ear and normal hearing sloping to a mild to moderate sensorineural hearing loss for the left ear.  A speech reception threshold was obtained at 35 dBHL for the right ear and at 35 dBHL for the left ear.  Speech discrimination was 92% for the right ear and 92% for the left ear.      Tympanometry testing revealed a Type A tympanogram for the right ear and a Type A tympanogram for the left ear.      Recommendations:  1. Otologic evaluation  2. Annual audiological evaluation  3. Hearing protection when in noise   4. Hearing aid consultation

## 2022-08-30 ENCOUNTER — TELEPHONE (OUTPATIENT)
Dept: AUDIOLOGY | Facility: CLINIC | Age: 47
End: 2022-08-30
Payer: COMMERCIAL

## 2022-08-30 NOTE — TELEPHONE ENCOUNTER
----- Message from Bolivar Cortes, CCC-A sent at 8/29/2022  3:10 PM CDT -----  Regarding: Earlier TYSON Kennedy Lorenzo!    I have a spot on 09/14 at 1PM if this patient would like to be seen a week earlier? Thanks!

## 2022-10-06 ENCOUNTER — OFFICE VISIT (OUTPATIENT)
Dept: PRIMARY CARE CLINIC | Facility: CLINIC | Age: 47
End: 2022-10-06
Payer: COMMERCIAL

## 2022-10-06 VITALS
HEART RATE: 67 BPM | OXYGEN SATURATION: 98 % | BODY MASS INDEX: 31.73 KG/M2 | SYSTOLIC BLOOD PRESSURE: 112 MMHG | TEMPERATURE: 100 F | HEIGHT: 64 IN | DIASTOLIC BLOOD PRESSURE: 76 MMHG | WEIGHT: 185.88 LBS

## 2022-10-06 DIAGNOSIS — R79.89 ELEVATED PROLACTIN LEVEL: ICD-10-CM

## 2022-10-06 DIAGNOSIS — F33.2 SEVERE EPISODE OF RECURRENT MAJOR DEPRESSIVE DISORDER, WITHOUT PSYCHOTIC FEATURES: Primary | ICD-10-CM

## 2022-10-06 DIAGNOSIS — Z12.11 ENCOUNTER FOR SCREENING COLONOSCOPY: ICD-10-CM

## 2022-10-06 DIAGNOSIS — Q28.3 BRAIN VASCULAR MALFORMATION: ICD-10-CM

## 2022-10-06 DIAGNOSIS — D49.7 PITUITARY TUMOR: ICD-10-CM

## 2022-10-06 DIAGNOSIS — E78.5 HYPERLIPIDEMIA, UNSPECIFIED HYPERLIPIDEMIA TYPE: ICD-10-CM

## 2022-10-06 PROBLEM — G89.29 CHRONIC MUSCULOSKELETAL PAIN: Status: RESOLVED | Noted: 2021-05-05 | Resolved: 2022-10-06

## 2022-10-06 PROBLEM — J30.89 NON-SEASONAL ALLERGIC RHINITIS: Status: RESOLVED | Noted: 2021-05-05 | Resolved: 2022-10-06

## 2022-10-06 PROBLEM — Z86.16 HISTORY OF COVID-19: Status: RESOLVED | Noted: 2022-06-07 | Resolved: 2022-10-06

## 2022-10-06 PROBLEM — G57.11 MERALGIA PARESTHETICA OF RIGHT SIDE: Status: RESOLVED | Noted: 2021-05-05 | Resolved: 2022-10-06

## 2022-10-06 PROBLEM — M79.18 CHRONIC MUSCULOSKELETAL PAIN: Status: RESOLVED | Noted: 2021-05-05 | Resolved: 2022-10-06

## 2022-10-06 PROCEDURE — 1159F PR MEDICATION LIST DOCUMENTED IN MEDICAL RECORD: ICD-10-PCS | Mod: CPTII,S$GLB,, | Performed by: STUDENT IN AN ORGANIZED HEALTH CARE EDUCATION/TRAINING PROGRAM

## 2022-10-06 PROCEDURE — 99999 PR PBB SHADOW E&M-EST. PATIENT-LVL V: ICD-10-PCS | Mod: PBBFAC,,, | Performed by: STUDENT IN AN ORGANIZED HEALTH CARE EDUCATION/TRAINING PROGRAM

## 2022-10-06 PROCEDURE — 3074F SYST BP LT 130 MM HG: CPT | Mod: CPTII,S$GLB,, | Performed by: STUDENT IN AN ORGANIZED HEALTH CARE EDUCATION/TRAINING PROGRAM

## 2022-10-06 PROCEDURE — 3078F PR MOST RECENT DIASTOLIC BLOOD PRESSURE < 80 MM HG: ICD-10-PCS | Mod: CPTII,S$GLB,, | Performed by: STUDENT IN AN ORGANIZED HEALTH CARE EDUCATION/TRAINING PROGRAM

## 2022-10-06 PROCEDURE — 3074F PR MOST RECENT SYSTOLIC BLOOD PRESSURE < 130 MM HG: ICD-10-PCS | Mod: CPTII,S$GLB,, | Performed by: STUDENT IN AN ORGANIZED HEALTH CARE EDUCATION/TRAINING PROGRAM

## 2022-10-06 PROCEDURE — 1159F MED LIST DOCD IN RCRD: CPT | Mod: CPTII,S$GLB,, | Performed by: STUDENT IN AN ORGANIZED HEALTH CARE EDUCATION/TRAINING PROGRAM

## 2022-10-06 PROCEDURE — 3008F PR BODY MASS INDEX (BMI) DOCUMENTED: ICD-10-PCS | Mod: CPTII,S$GLB,, | Performed by: STUDENT IN AN ORGANIZED HEALTH CARE EDUCATION/TRAINING PROGRAM

## 2022-10-06 PROCEDURE — 99999 PR PBB SHADOW E&M-EST. PATIENT-LVL V: CPT | Mod: PBBFAC,,, | Performed by: STUDENT IN AN ORGANIZED HEALTH CARE EDUCATION/TRAINING PROGRAM

## 2022-10-06 PROCEDURE — 99214 OFFICE O/P EST MOD 30 MIN: CPT | Mod: S$GLB,,, | Performed by: STUDENT IN AN ORGANIZED HEALTH CARE EDUCATION/TRAINING PROGRAM

## 2022-10-06 PROCEDURE — 3078F DIAST BP <80 MM HG: CPT | Mod: CPTII,S$GLB,, | Performed by: STUDENT IN AN ORGANIZED HEALTH CARE EDUCATION/TRAINING PROGRAM

## 2022-10-06 PROCEDURE — 3044F HG A1C LEVEL LT 7.0%: CPT | Mod: CPTII,S$GLB,, | Performed by: STUDENT IN AN ORGANIZED HEALTH CARE EDUCATION/TRAINING PROGRAM

## 2022-10-06 PROCEDURE — 99214 PR OFFICE/OUTPT VISIT, EST, LEVL IV, 30-39 MIN: ICD-10-PCS | Mod: S$GLB,,, | Performed by: STUDENT IN AN ORGANIZED HEALTH CARE EDUCATION/TRAINING PROGRAM

## 2022-10-06 PROCEDURE — 3008F BODY MASS INDEX DOCD: CPT | Mod: CPTII,S$GLB,, | Performed by: STUDENT IN AN ORGANIZED HEALTH CARE EDUCATION/TRAINING PROGRAM

## 2022-10-06 PROCEDURE — 3044F PR MOST RECENT HEMOGLOBIN A1C LEVEL <7.0%: ICD-10-PCS | Mod: CPTII,S$GLB,, | Performed by: STUDENT IN AN ORGANIZED HEALTH CARE EDUCATION/TRAINING PROGRAM

## 2022-10-06 NOTE — PROGRESS NOTES
Desiree Salas  1975        Subjective     Chief Complaint: Est Care    History of Present Illness:  Ms. Desiree Salas is a 47 y.o. female who presents to clinic for     Seeing Endo for pituitary adenoma (2.5 mm tumor) and elevated prolactin found during work-up by OBGYN for fertility. Was on cabergoline to help, no longer on it. On progesterone only.   No longer on tamoxifen and estradiol and cetrotide and novarel for fertility.     Sensineural hearing loss-saw ENT 8/2022. Also saw Neurosurgery for possible vascular malformation.    Weaning off prozac/deplin. On wellbutrin different dose every day. Very depressed. Trouble sleeping, eating more than usual. Stressed about job, fertility. Seeing Psych outside system but wants to switch. Has had SI in past but not currently.     Prior tobacco use (quit about 5 yrs ago). THC use.     Interested in colonoscopy. No fam hx. No BM changes or blood that are new. Slight constipation possibly from OTC supplement.    Was having menstrual cycle during UA in ED (had RBCs and blood).     Had mammogram out of system, will send results.     Review of Systems   Constitutional:  Negative for chills, fever and malaise/fatigue.   HENT:  Negative for sore throat.    Respiratory:  Negative for cough and shortness of breath.    Cardiovascular:  Negative for chest pain, palpitations and leg swelling.   Gastrointestinal:  Positive for constipation. Negative for abdominal pain, diarrhea and nausea.   Genitourinary:  Negative for dysuria.   Skin:  Negative for rash.   Neurological:  Negative for weakness.   Psychiatric/Behavioral:  Positive for depression. Negative for hallucinations. The patient has insomnia.       PAST HISTORY:     Past Medical History:   Diagnosis Date    ADHD (attention deficit hyperactivity disorder), inattentive type     Anxiety     Asthma     exercise induced; no inhaler use    Depression     Pituitary tumor        Past Surgical History:   Procedure Laterality Date     CYST REMOVAL      groin area    DILATION AND CURETTAGE OF UTERUS      due to heavy/irregular menses    EXCISION OF GANGLION OF WRIST Left 2018    Procedure: EXCISION, GANGLION CYST, WRIST LEFT;  Surgeon: Walter Joyner Jr., MD;  Location: [facility];  Service: Plastics;  Laterality: Left;    MYOMECTOMY N/A 3/15/2019    Procedure: MYOMECTOMY OPEN;  Surgeon: Heena Castaneda MD;  Location: [facility] OR;  Service: OB/GYN;  Laterality: N/A;  Dr. Becerra to asst       Family History   Problem Relation Age of Onset    Alzheimer's disease Maternal Grandmother     Diabetes Father     Hypertension Father     Diabetes Mother     Cancer Brother         unsure of the type    Migraines Sister     Breast cancer Neg Hx        Social History     Socioeconomic History    Marital status: Single   Occupational History     Comment: Works at [employer] -    Tobacco Use    Smoking status: Former     Years: 2.00     Types: Cigarettes     Quit date: 2017     Years since quittin.2    Smokeless tobacco: Never    Tobacco comments:     1 pack in a week   Substance and Sexual Activity    Alcohol use: Not Currently    Drug use: Yes     Types: Marijuana     Comment: daily    Sexual activity: Yes     Partners: Male     Birth control/protection: Condom       MEDICATIONS & ALLERGIES:     Current Outpatient Medications on File Prior to Visit   Medication Sig    buPROPion (WELLBUTRIN XL) 150 MG TB24 tablet Take 150 mg by mouth every other day.    buPROPion (WELLBUTRIN XL) 300 MG 24 hr tablet Take 300 mg by mouth every other day.    FLUoxetine (PROZAC) 40 MG capsule Take 80 mg by mouth once daily.    levomefolate calcium (DEPLIN ORAL) Take 15 mg by mouth once daily.    PNV no.95/ferrous fum/folic ac (PRENATAL MULTIVITAMINS ORAL) Take 1 tablet by mouth once daily.    [DISCONTINUED] FLUoxetine (PROZAC) 10 MG capsule TK ONE C PO  DAILY ON DAYS 14-28 OF CYCLE PLUS FIRST DAY OF MENSTRUATION    [DISCONTINUED] cabergoline  "(DOSTINEX) 0.5 mg tablet Take 0.25 mg by mouth twice a week.    [DISCONTINUED] diphth,pertus,acell,,tetanus (BOOSTRIX TDAP) 2.5-8-5 Lf-mcg-Lf/0.5mL Syrg injection Inject into the muscle. (Patient not taking: Reported on 6/23/2022)    [DISCONTINUED] melatonin 5 mg Cap Take 1 each by mouth every evening.    [DISCONTINUED] ondansetron (ZOFRAN-ODT) 4 MG TbDL Take 1 tablet (4 mg total) by mouth every 8 (eight) hours as needed (Nausea).    [DISCONTINUED] progesterone (PROMETRIUM) 200 MG capsule Take 1 capsule (200 mg total) by mouth nightly.     No current facility-administered medications on file prior to visit.       Review of patient's allergies indicates:   Allergen Reactions    Grass pollen Other (See Comments)    House dust Itching       OBJECTIVE:     Vital Signs:  Vitals:    10/06/22 1602   BP: 112/76   BP Location: Right arm   Patient Position: Sitting   BP Method: Medium (Manual)   Pulse: 67   Temp: 99.7 °F (37.6 °C)   TempSrc: Oral   SpO2: 98%   Weight: 84.3 kg (185 lb 13.6 oz)   Height: 5' 4" (1.626 m)       Body mass index is 31.9 kg/m².     Physical Exam:  Physical Exam  Vitals and nursing note reviewed.   Constitutional:       General: She is not in acute distress.     Appearance: Normal appearance. She is not ill-appearing, toxic-appearing or diaphoretic.   HENT:      Head: Normocephalic and atraumatic.   Eyes:      General: No scleral icterus.     Conjunctiva/sclera: Conjunctivae normal.   Cardiovascular:      Rate and Rhythm: Normal rate and regular rhythm.   Pulmonary:      Effort: Pulmonary effort is normal. No respiratory distress.      Breath sounds: Normal breath sounds. No wheezing.   Abdominal:      General: There is no distension.   Musculoskeletal:         General: Normal range of motion.      Cervical back: Normal range of motion.      Right lower leg: No edema.      Left lower leg: No edema.   Skin:     General: Skin is warm and dry.   Neurological:      General: No focal deficit present.      " Mental Status: She is alert and oriented to person, place, and time.   Psychiatric:         Attention and Perception: Attention normal.         Mood and Affect: Mood is depressed. Affect is flat. Affect is not angry, tearful or inappropriate.         Speech: Speech normal. Speech is not rapid and pressured, delayed, slurred or tangential.         Behavior: Behavior normal. Behavior is not agitated or aggressive. Behavior is cooperative.         Thought Content: Thought content does not include suicidal ideation.      Comments: Pt with passive SI, no active SI at this moment.   Has sister that she is close to, family in town.             Laboratory  Lab Results   Component Value Date    WBC 10.27 08/12/2022    HGB 12.1 08/12/2022    HCT 37.5 08/12/2022    MCV 85 08/12/2022     08/12/2022     Lab Results   Component Value Date     08/12/2022     08/12/2022    K 4.9 08/12/2022     08/12/2022    CO2 22 (L) 08/12/2022    BUN 11 08/12/2022    CREATININE 1.1 08/12/2022    CALCIUM 9.1 08/12/2022     No results found for: INR, PROTIME  Lab Results   Component Value Date    HGBA1C 5.3 06/07/2022           Health Maintenance         Date Due Completion Date    COVID-19 Vaccine (4 - Booster for Soniya series) 01/14/2022 11/19/2021    Colorectal Cancer Screening Never done ---    Influenza Vaccine (1) 09/01/2022 10/25/2018    Mammogram 06/14/2023 6/14/2022    Override on 2/12/2019: Done    Override on 8/18/2017: Done    Override on 5/7/2017: Done    Cervical Cancer Screening 10/08/2026 10/8/2021    Lipid Panel 06/07/2027 6/7/2022    TETANUS VACCINE 06/07/2032 6/7/2022              ASSESSMENT & PLAN:   Ms. Desiree Salas is a 47 y.o. female who was seen today in clinic for est care.    Desiree was seen today for \[facility] [place] [facility]\"" care.    Diagnoses and all orders for this visit:    Severe episode of recurrent major depressive disorder, without psychotic features  -     Ambulatory referral/consult to [facility] (Non-Opioids); Future  -     Ambulatory referral/consult to Psychiatry; Future  -     Seeing Psych out of system currently who is managing meds but asking to switch to [facility]  -     No active SI today, offered ED visit but pt declined, denies current thoughts of hurting herself or others  -     Counseled on calling 911/going to ED urgently with any thoughts of harming herself. Has sister and family in town for support.   -      Will f/u 1 week with virtual for close follow-up  -      Message sent to  here to assist in scheduling asap as well.     Elevated prolactin level  Pituitary tumor  -managed by Endo    Brain vascular malformation  -Followed by Neurosurgery, stable    Hyperlipidemia, unspecified hyperlipidemia type  -     Lipid Panel; Future-->fasting tomorrow or Monday     Encounter for screening colonoscopy  -     Case Request Endoscopy: COLONOSCOPY     RTC in 1 week virtual f/u  Flu vaccine today  Needs to send mammogram date/results.    Mallika Galvan MD  Internal Medicine       Established. Time spent in the evaluation and management of this patient exceeded 30 min and greater than 50% of this time was in face-to-face education regarding diagnoses, medications, plan, and follow-up.

## 2022-10-06 NOTE — Clinical Note
Hi,   Could we help get her in to see you or someone in your department sooner rather than later? I can chat about it if needed! I think she is high risk and would really benefit from this resource.   Thanks so much,  Mallika

## 2022-10-07 ENCOUNTER — PATIENT MESSAGE (OUTPATIENT)
Dept: BEHAVIORAL HEALTH | Facility: CLINIC | Age: 47
End: 2022-10-07
Payer: COMMERCIAL

## 2022-10-07 ENCOUNTER — TELEPHONE (OUTPATIENT)
Dept: BEHAVIORAL HEALTH | Facility: CLINIC | Age: 47
End: 2022-10-07
Payer: COMMERCIAL

## 2022-10-07 NOTE — PROGRESS NOTES
CHW reached out to pt referred from PCP to Psychiatry and I. Pt is seen by [facility] ([facility]) and reports that her symptoms continue to be severe.  Pt believes she needs long-term therapy. Pt was given the phone number for [facility] since pt requested this referral from PCP.  Pt will decide if she will remain with [facility] or go with [facility]. Resources sent to pt portal. Referral for I will close. Notice to PCP.

## 2022-10-11 ENCOUNTER — PATIENT MESSAGE (OUTPATIENT)
Dept: PRIMARY CARE CLINIC | Facility: CLINIC | Age: 47
End: 2022-10-11
Payer: COMMERCIAL

## 2022-10-13 ENCOUNTER — LAB VISIT (OUTPATIENT)
Dept: LAB | Facility: HOSPITAL | Age: 47
End: 2022-10-13
Attending: STUDENT IN AN ORGANIZED HEALTH CARE EDUCATION/TRAINING PROGRAM
Payer: COMMERCIAL

## 2022-10-13 DIAGNOSIS — E78.5 HYPERLIPIDEMIA, UNSPECIFIED HYPERLIPIDEMIA TYPE: ICD-10-CM

## 2022-10-13 LAB
CHOLEST SERPL-MCNC: 226 MG/DL (ref 120–199)
CHOLEST/HDLC SERPL: 2.8 {RATIO} (ref 2–5)
HDLC SERPL-MCNC: 81 MG/DL (ref 40–75)
HDLC SERPL: 35.8 % (ref 20–50)
LDLC SERPL CALC-MCNC: 134.2 MG/DL (ref 63–159)
NONHDLC SERPL-MCNC: 145 MG/DL
TRIGL SERPL-MCNC: 54 MG/DL (ref 30–150)

## 2022-10-13 PROCEDURE — 36415 COLL VENOUS BLD VENIPUNCTURE: CPT | Mod: PN | Performed by: STUDENT IN AN ORGANIZED HEALTH CARE EDUCATION/TRAINING PROGRAM

## 2022-10-13 PROCEDURE — 80061 LIPID PANEL: CPT | Performed by: STUDENT IN AN ORGANIZED HEALTH CARE EDUCATION/TRAINING PROGRAM

## 2022-10-14 ENCOUNTER — PATIENT MESSAGE (OUTPATIENT)
Dept: PRIMARY CARE CLINIC | Facility: CLINIC | Age: 47
End: 2022-10-14
Payer: COMMERCIAL

## 2022-10-14 DIAGNOSIS — E78.5 HYPERLIPIDEMIA, UNSPECIFIED HYPERLIPIDEMIA TYPE: Primary | ICD-10-CM

## 2022-10-14 NOTE — TELEPHONE ENCOUNTER
The 10-year ASCVD risk score (Wilner NANCE, et al., 2019) is: 0.3%    Values used to calculate the score:      Age: 47 years      Sex: Female      Is Non- : Yes      Diabetic: No      Tobacco smoker: No      Systolic Blood Pressure: 112 mmHg      Is BP treated: No      HDL Cholesterol: 81 mg/dL      Total Cholesterol: 226 mg/dL     T cholesterol and LDL lower than before (although Tcholesterol still slightly elevated at 226). HLD high.    Can trial lifestyle changes first, if still high in 6m, start statin.     Mallika Galvan MD  Internal Medicine   [facility]

## 2022-10-21 ENCOUNTER — PATIENT OUTREACH (OUTPATIENT)
Dept: ADMINISTRATIVE | Facility: HOSPITAL | Age: 47
End: 2022-10-21
Payer: COMMERCIAL

## 2022-10-21 NOTE — PROGRESS NOTES
Patient due for the following    Colorectal Cancer Screening     Influenza Vaccine (1)      Received mammogram records.  Scanned to media.  updated    Immunizations: reviewed and updated  Care Everywhere: triggered  Care Teams: up to date  Outreach: none needed

## 2022-10-31 ENCOUNTER — PATIENT MESSAGE (OUTPATIENT)
Dept: PRIMARY CARE CLINIC | Facility: CLINIC | Age: 47
End: 2022-10-31
Payer: COMMERCIAL

## 2022-11-10 ENCOUNTER — OFFICE VISIT (OUTPATIENT)
Dept: PRIMARY CARE CLINIC | Facility: CLINIC | Age: 47
End: 2022-11-10
Payer: COMMERCIAL

## 2022-11-10 DIAGNOSIS — F41.9 ANXIETY AND DEPRESSION: Primary | ICD-10-CM

## 2022-11-10 DIAGNOSIS — T78.40XA ALLERGY, INITIAL ENCOUNTER: ICD-10-CM

## 2022-11-10 DIAGNOSIS — F32.A ANXIETY AND DEPRESSION: Primary | ICD-10-CM

## 2022-11-10 DIAGNOSIS — Z98.890 S/P MYOMECTOMY: ICD-10-CM

## 2022-11-10 PROCEDURE — 99213 PR OFFICE/OUTPT VISIT, EST, LEVL III, 20-29 MIN: ICD-10-PCS | Mod: 95,,, | Performed by: STUDENT IN AN ORGANIZED HEALTH CARE EDUCATION/TRAINING PROGRAM

## 2022-11-10 PROCEDURE — 1159F MED LIST DOCD IN RCRD: CPT | Mod: CPTII,95,, | Performed by: STUDENT IN AN ORGANIZED HEALTH CARE EDUCATION/TRAINING PROGRAM

## 2022-11-10 PROCEDURE — 3044F PR MOST RECENT HEMOGLOBIN A1C LEVEL <7.0%: ICD-10-PCS | Mod: CPTII,95,, | Performed by: STUDENT IN AN ORGANIZED HEALTH CARE EDUCATION/TRAINING PROGRAM

## 2022-11-10 PROCEDURE — 1160F PR REVIEW ALL MEDS BY PRESCRIBER/CLIN PHARMACIST DOCUMENTED: ICD-10-PCS | Mod: CPTII,95,, | Performed by: STUDENT IN AN ORGANIZED HEALTH CARE EDUCATION/TRAINING PROGRAM

## 2022-11-10 PROCEDURE — 99213 OFFICE O/P EST LOW 20 MIN: CPT | Mod: 95,,, | Performed by: STUDENT IN AN ORGANIZED HEALTH CARE EDUCATION/TRAINING PROGRAM

## 2022-11-10 PROCEDURE — 3044F HG A1C LEVEL LT 7.0%: CPT | Mod: CPTII,95,, | Performed by: STUDENT IN AN ORGANIZED HEALTH CARE EDUCATION/TRAINING PROGRAM

## 2022-11-10 PROCEDURE — 1159F PR MEDICATION LIST DOCUMENTED IN MEDICAL RECORD: ICD-10-PCS | Mod: CPTII,95,, | Performed by: STUDENT IN AN ORGANIZED HEALTH CARE EDUCATION/TRAINING PROGRAM

## 2022-11-10 PROCEDURE — 1160F RVW MEDS BY RX/DR IN RCRD: CPT | Mod: CPTII,95,, | Performed by: STUDENT IN AN ORGANIZED HEALTH CARE EDUCATION/TRAINING PROGRAM

## 2022-11-10 RX ORDER — FLUTICASONE PROPIONATE 50 MCG
1 SPRAY, SUSPENSION (ML) NASAL DAILY
Qty: 11.1 ML | Refills: 3 | Status: SHIPPED | OUTPATIENT
Start: 2022-11-10 | End: 2022-11-16

## 2022-11-10 RX ORDER — DULOXETIN HYDROCHLORIDE 30 MG/1
30 CAPSULE, DELAYED RELEASE ORAL DAILY
COMMUNITY
Start: 2022-11-09 | End: 2023-09-13 | Stop reason: SDUPTHER

## 2022-11-10 RX ORDER — AZELASTINE 1 MG/ML
1 SPRAY, METERED NASAL 2 TIMES DAILY
Qty: 30 ML | Refills: 0 | Status: SHIPPED | OUTPATIENT
Start: 2022-11-10 | End: 2022-11-16

## 2022-11-10 NOTE — PROGRESS NOTES
The patient location is: her home in [place]  The chief complaint leading to consultation is: Follow-up Depression    Visit type: audiovisual        Desiree Salas  1975        Subjective     Chief Complaint:    History of Present Illness:  Ms. Desiree Salas is a 47 y.o. female who presents for [facility] for follow-up depression.     Seeing therapist through [facility] weekly, 5th visit so far     Not on Prozac anymore. On Wellbutrin 300 mg and 150 mg every other day per Psychiatry also through [facility].   On Cymbalta ER 30 mg. 5th week of starting this. Cymbalta.     Was taking Prozac before for PMDD. Did not help that at all.    Has had myomectomy before. Still incapacitating with painful.   Not ready for hysterectomy. Unsure if has endometriosis.       Review of Systems   Eyes:  Positive for discharge.   Respiratory:  Negative for wheezing.    Cardiovascular:  Negative for chest pain and palpitations.   Gastrointestinal:  Negative for blood in stool and vomiting.   Genitourinary:  Negative for dysuria and hematuria.   Musculoskeletal:  Positive for neck pain.   Neurological:  Positive for weakness and headaches.   Endo/Heme/Allergies:  Negative for polydipsia.      PAST HISTORY:     Past Medical History:   Diagnosis Date    ADHD (attention deficit hyperactivity disorder), inattentive type     Anxiety     Asthma     exercise induced; no inhaler use    Depression     Pituitary tumor        Past Surgical History:   Procedure Laterality Date    CYST REMOVAL  2016    groin area    DILATION AND CURETTAGE OF UTERUS  1998    due to heavy/irregular menses    EXCISION OF GANGLION OF WRIST Left 12/18/2018    Procedure: EXCISION, GANGLION CYST, WRIST LEFT;  Surgeon: Walter Joyner Jr., MD;  Location: [facility];  Service: Plastics;  Laterality: Left;    MYOMECTOMY N/A 3/15/2019    Procedure: MYOMECTOMY OPEN;  Surgeon: Heena Castaneda MD;  Location: [facility];  Service: OB/GYN;  Laterality: N/A;  Dr. Becerra to asst       Family History    Problem Relation Age of Onset    Alzheimer's disease Maternal Grandmother     Diabetes Father     Hypertension Father     Diabetes Mother     Cancer Brother         unsure of the type    Migraines Sister     Breast cancer Neg Hx        Social History     Socioeconomic History    Marital status: Single   Occupational History     Comment: Works at [employer] -    Tobacco Use    Smoking status: Former     Years: 2.00     Types: Cigarettes     Quit date: 2017     Years since quittin.3    Smokeless tobacco: Never    Tobacco comments:     1 pack in a week   Substance and Sexual Activity    Alcohol use: Not Currently    Drug use: Yes     Types: Marijuana     Comment: daily    Sexual activity: Yes     Partners: Male     Birth control/protection: Condom       MEDICATIONS & ALLERGIES:     Current Outpatient Medications on File Prior to Visit   Medication Sig    buPROPion (WELLBUTRIN XL) 150 MG TB24 tablet Take 150 mg by mouth every other day.    buPROPion (WELLBUTRIN XL) 300 MG 24 hr tablet Take 300 mg by mouth every other day.    DULoxetine (CYMBALTA) 30 MG capsule Take 30 mg by mouth once daily.    PNV no.95/ferrous fum/folic ac (PRENATAL MULTIVITAMINS ORAL) Take 1 tablet by mouth once daily.    [DISCONTINUED] FLUoxetine (PROZAC) 40 MG capsule Take 80 mg by mouth once daily.    [DISCONTINUED] levomefolate calcium (DEPLIN ORAL) Take 15 mg by mouth once daily.     No current facility-administered medications on file prior to visit.       Review of patient's allergies indicates:   Allergen Reactions    Grass pollen Other (See Comments)    House dust Itching       OBJECTIVE:     Vital Signs:  There were no vitals filed for this visit.    There is no height or weight on file to calculate BMI.     Physical Exam:  Physical Exam  Constitutional:       General: She is not in acute distress.     Appearance: Normal appearance. She is not ill-appearing, toxic-appearing or diaphoretic.      Comments: Limited 2/2  Virtual Exam   HENT:      Head: Normocephalic and atraumatic.   Eyes:      Conjunctiva/sclera: Conjunctivae normal.   Pulmonary:      Effort: Pulmonary effort is normal. No respiratory distress.   Neurological:      Mental Status: She is alert and oriented to person, place, and time. Mental status is at baseline.   Psychiatric:         Attention and Perception: Attention normal.         Mood and Affect: Mood and affect normal.         Speech: Speech normal. She is communicative. Speech is not rapid and pressured or delayed.         Behavior: Behavior normal. Behavior is not agitated or aggressive. Behavior is cooperative.          Laboratory  Lab Results   Component Value Date    WBC 10.27 08/12/2022    HGB 12.1 08/12/2022    HCT 37.5 08/12/2022    MCV 85 08/12/2022     08/12/2022     Lab Results   Component Value Date     08/12/2022     08/12/2022    K 4.9 08/12/2022     08/12/2022    CO2 22 (L) 08/12/2022    BUN 11 08/12/2022    CREATININE 1.1 08/12/2022    CALCIUM 9.1 08/12/2022     No results found for: INR, PROTIME  Lab Results   Component Value Date    HGBA1C 5.3 06/07/2022           Health Maintenance         Date Due Completion Date    Colorectal Cancer Screening Never done ---    Influenza Vaccine (1) 09/01/2022 10/25/2018    Mammogram 06/28/2023 6/28/2022    Override on 2/12/2019: Done    Override on 8/18/2017: Done    Override on 5/7/2017: Done    Cervical Cancer Screening 10/08/2026 10/8/2021    Lipid Panel 10/13/2027 10/13/2022    TETANUS VACCINE 06/07/2032 6/7/2022              ASSESSMENT & PLAN:   Ms. Desiree Salas is a 47 y.o. female who was seen today for follow-up anxiety/depression. Doing much better. Establish with Psychiatry and therapist at [facility]. On Wellbutrin and Cymbalta per them. Feels therapy has helped greatly, had breakthrough. Mood/energy improved.     Asking to see Allergy for repeat allergy testing. Hold antihistamines (including any OTC ones) 1 week prior to  patch testing. Trial flonase and azalastine nasal spray.     Discussed possible endometriosis. Wants to hold off on possible hysterectomy for now.        1. Anxiety and depression    2. Allergy, initial encounter  -     Ambulatory referral/consult to Allergy; Future; Expected date: 11/17/2022  -     azelastine (ASTELIN) 137 mcg (0.1 %) nasal spray; 1 spray (137 mcg total) by Nasal route 2 (two) times daily.  Dispense: 30 mL; Refill: 0  -     fluticasone propionate (FLONASE) 50 mcg/actuation nasal spray; 1 spray (50 mcg total) by Each Nostril route once daily.  Dispense: 11.1 mL; Refill: 3    3. S/P myomectomy       RTC in  with another virtual    Mallika Galvan MD  Internal Medicine           Face to Face time with patient: 20  30 minutes of total time spent on the encounter, which includes face to face time and non-face to face time preparing to see the patient (eg, review of tests), Obtaining and/or reviewing separately obtained history, Documenting clinical information in the electronic or other health record, Independently interpreting results (not separately reported) and communicating results to the patient/family/caregiver, or Care coordination (not separately reported).         Each patient to whom he or she provides medical services by telemedicine is:  (1) informed of the relationship between the physician and patient and the respective role of any other health care provider with respect to management of the patient; and (2) notified that he or she may decline to receive medical services by telemedicine and may withdraw from such care at any time.    Notes:     Answers submitted by the patient for this visit:  Review of Systems Questionnaire (Submitted on 11/8/2022)  activity change: No  unexpected weight change: No  rhinorrhea: Yes  trouble swallowing: No  visual disturbance: Yes  chest tightness: No  polyuria: Yes  difficulty urinating: No  menstrual problem: Yes  joint swelling: No  arthralgias:  Yes  confusion: Yes  dysphoric mood: Yes

## 2022-11-16 ENCOUNTER — OFFICE VISIT (OUTPATIENT)
Dept: ALLERGY | Facility: CLINIC | Age: 47
End: 2022-11-16
Payer: COMMERCIAL

## 2022-11-16 ENCOUNTER — LAB VISIT (OUTPATIENT)
Dept: LAB | Facility: HOSPITAL | Age: 47
End: 2022-11-16
Attending: STUDENT IN AN ORGANIZED HEALTH CARE EDUCATION/TRAINING PROGRAM
Payer: COMMERCIAL

## 2022-11-16 VITALS — WEIGHT: 185.19 LBS | HEIGHT: 63 IN | BODY MASS INDEX: 32.81 KG/M2

## 2022-11-16 DIAGNOSIS — D49.7 PITUITARY TUMOR: ICD-10-CM

## 2022-11-16 DIAGNOSIS — T78.40XA ALLERGY, INITIAL ENCOUNTER: ICD-10-CM

## 2022-11-16 DIAGNOSIS — J31.0 CHRONIC RHINITIS: ICD-10-CM

## 2022-11-16 DIAGNOSIS — J31.0 CHRONIC RHINITIS: Primary | ICD-10-CM

## 2022-11-16 DIAGNOSIS — R79.89 ELEVATED PROLACTIN LEVEL: ICD-10-CM

## 2022-11-16 LAB
FSH SERPL-ACNC: 2.34 MIU/ML
PROLACTIN SERPL IA-MCNC: 19.9 NG/ML (ref 5.2–26.5)

## 2022-11-16 PROCEDURE — 1160F RVW MEDS BY RX/DR IN RCRD: CPT | Mod: CPTII,S$GLB,, | Performed by: STUDENT IN AN ORGANIZED HEALTH CARE EDUCATION/TRAINING PROGRAM

## 2022-11-16 PROCEDURE — 86003 ALLG SPEC IGE CRUDE XTRC EA: CPT | Performed by: STUDENT IN AN ORGANIZED HEALTH CARE EDUCATION/TRAINING PROGRAM

## 2022-11-16 PROCEDURE — 3008F BODY MASS INDEX DOCD: CPT | Mod: CPTII,S$GLB,, | Performed by: STUDENT IN AN ORGANIZED HEALTH CARE EDUCATION/TRAINING PROGRAM

## 2022-11-16 PROCEDURE — 99999 PR PBB SHADOW E&M-EST. PATIENT-LVL III: CPT | Mod: PBBFAC,,, | Performed by: STUDENT IN AN ORGANIZED HEALTH CARE EDUCATION/TRAINING PROGRAM

## 2022-11-16 PROCEDURE — 99999 PR PBB SHADOW E&M-EST. PATIENT-LVL III: ICD-10-PCS | Mod: PBBFAC,,, | Performed by: STUDENT IN AN ORGANIZED HEALTH CARE EDUCATION/TRAINING PROGRAM

## 2022-11-16 PROCEDURE — 84146 ASSAY OF PROLACTIN: CPT | Performed by: INTERNAL MEDICINE

## 2022-11-16 PROCEDURE — 83001 ASSAY OF GONADOTROPIN (FSH): CPT | Performed by: INTERNAL MEDICINE

## 2022-11-16 PROCEDURE — 99203 PR OFFICE/OUTPT VISIT, NEW, LEVL III, 30-44 MIN: ICD-10-PCS | Mod: S$GLB,,, | Performed by: STUDENT IN AN ORGANIZED HEALTH CARE EDUCATION/TRAINING PROGRAM

## 2022-11-16 PROCEDURE — 36415 COLL VENOUS BLD VENIPUNCTURE: CPT | Mod: PN | Performed by: STUDENT IN AN ORGANIZED HEALTH CARE EDUCATION/TRAINING PROGRAM

## 2022-11-16 PROCEDURE — 3044F HG A1C LEVEL LT 7.0%: CPT | Mod: CPTII,S$GLB,, | Performed by: STUDENT IN AN ORGANIZED HEALTH CARE EDUCATION/TRAINING PROGRAM

## 2022-11-16 PROCEDURE — 82785 ASSAY OF IGE: CPT | Performed by: STUDENT IN AN ORGANIZED HEALTH CARE EDUCATION/TRAINING PROGRAM

## 2022-11-16 PROCEDURE — 3008F PR BODY MASS INDEX (BMI) DOCUMENTED: ICD-10-PCS | Mod: CPTII,S$GLB,, | Performed by: STUDENT IN AN ORGANIZED HEALTH CARE EDUCATION/TRAINING PROGRAM

## 2022-11-16 PROCEDURE — 86003 ALLG SPEC IGE CRUDE XTRC EA: CPT | Mod: 59 | Performed by: STUDENT IN AN ORGANIZED HEALTH CARE EDUCATION/TRAINING PROGRAM

## 2022-11-16 PROCEDURE — 1160F PR REVIEW ALL MEDS BY PRESCRIBER/CLIN PHARMACIST DOCUMENTED: ICD-10-PCS | Mod: CPTII,S$GLB,, | Performed by: STUDENT IN AN ORGANIZED HEALTH CARE EDUCATION/TRAINING PROGRAM

## 2022-11-16 PROCEDURE — 3044F PR MOST RECENT HEMOGLOBIN A1C LEVEL <7.0%: ICD-10-PCS | Mod: CPTII,S$GLB,, | Performed by: STUDENT IN AN ORGANIZED HEALTH CARE EDUCATION/TRAINING PROGRAM

## 2022-11-16 PROCEDURE — 84305 ASSAY OF SOMATOMEDIN: CPT | Performed by: INTERNAL MEDICINE

## 2022-11-16 PROCEDURE — 99203 OFFICE O/P NEW LOW 30 MIN: CPT | Mod: S$GLB,,, | Performed by: STUDENT IN AN ORGANIZED HEALTH CARE EDUCATION/TRAINING PROGRAM

## 2022-11-16 PROCEDURE — 1159F MED LIST DOCD IN RCRD: CPT | Mod: CPTII,S$GLB,, | Performed by: STUDENT IN AN ORGANIZED HEALTH CARE EDUCATION/TRAINING PROGRAM

## 2022-11-16 PROCEDURE — 1159F PR MEDICATION LIST DOCUMENTED IN MEDICAL RECORD: ICD-10-PCS | Mod: CPTII,S$GLB,, | Performed by: STUDENT IN AN ORGANIZED HEALTH CARE EDUCATION/TRAINING PROGRAM

## 2022-11-16 RX ORDER — PSEUDOEPHEDRINE HCL 120 MG/1
120 TABLET, FILM COATED, EXTENDED RELEASE ORAL 2 TIMES DAILY PRN
Qty: 60 TABLET | Refills: 1 | Status: SHIPPED | OUTPATIENT
Start: 2022-11-16 | End: 2023-01-06

## 2022-11-16 RX ORDER — ACETAMINOPHEN 500 MG
500 TABLET ORAL EVERY 6 HOURS PRN
COMMUNITY
End: 2023-01-06

## 2022-11-16 RX ORDER — CETIRIZINE HYDROCHLORIDE 10 MG/1
10 TABLET ORAL DAILY
Refills: 0 | COMMUNITY
Start: 2022-11-16 | End: 2023-01-06

## 2022-11-16 NOTE — PROGRESS NOTES
Allergy Clinic Note  [facility]    This note was created by combination of typed  and M-Modal dictation. Transcription errors may be present.  If there are any questions, please contact me.    Subjective:      Patient ID: Desiree Salas is a 47 y.o. female.    Chief Complaint: Allergies      Referring Provider: Mallika Galvan    History of Present Illness: Desiree Salas is a 47 y.o. female referred by her internal medicine physician for evaluation and management of chronic rhinitis.  She is here alone, and she is a good historian.    Related medications and other interventions  Flonase 1 spray daily --never took  Astelin 1 spray twice daily -- never took  Advil cold and sinus--takes daily    11/16/2022:  At initial visit, Ms Salas reported an over 15 year here history of rhinitis, severe since August 2022.  Her main symptoms are nasal congestion, runny nose, and itchy eyes.  Associated symptoms include postnasal drip, sneezing, red/runny eyes, cough the level of her throat, throat clearing, and ear fullness.  When symptoms are severe, she also gets sinus infections.  She has no history of wheezing or chest tightness.  There is no clear circadian pattern.  She thinks you somewhat better in the fall and worse in winter.  She believes grass and dust may be precipitants.  She is found some benefit from Advil cold and sinus but was concerned about regular use of ibuprofen.  Claritin helped in the past but then stopped working.  She dislikes nose sprays.  She has never had allergy testing.    Patient's only other complaint is of small white to skin colored bumps on her forehead and lateral to her eyes which lasts less than a day and are mildly pruritic.    Other allergic syndromes   Frequent sinusitis   History exercise-induced asthma        MEDICAL HISTORY      Significant past medical history: ADD, Anxiety/depression, Hx smoking  Active Problem List reviewed   ENT surgery:  no    Significant family  history:  Exposures:  Smoking Hx:  Client  reports that she quit smoking about 5 years ago. Her smoking use included cigarettes. She has never used smokeless tobacco.    Meds:  MAR reviewed    Asthma:  History exercise-induced asthma  Eczema: no  Rhinitis: yes--See HPI  Drug allergy/intolerance:  NKDA  Venom allergy: no  Latex allergy:  no    Patient Active Problem List   Diagnosis    Anxiety and depression    Attention deficit hyperactivity disorder (ADHD), predominantly inattentive type    Ganglion cyst of wrist, left    S/P myomectomy    Right leg paresthesias    Chronic midline low back pain without sciatica    Chronic hip pain, right    Nasal septal deviation    Sensorineural hearing loss (SNHL) of both ears    Chronic pain of both knees    Hypercholesteremia    Snoring    Pituitary tumor    Elevated prolactin level    Brain vascular malformation     Medication List with Changes/Refills   New Medications    CETIRIZINE (ZYRTEC) 10 MG TABLET    Take 1 tablet (10 mg total) by mouth once daily. May take an extra if needed.       Start Date: 11/16/2022End Date: 11/16/2023    PSEUDOEPHEDRINE (SUDAFED 12 HOUR) 120 MG 12 HR TABLET    Take 1 tablet (120 mg total) by mouth 2 (two) times daily as needed for Congestion (or head pressure.). Take one in AM and one midday.       Start Date: 11/16/2022End Date: --   Current Medications    ACETAMINOPHEN (TYLENOL) 500 MG TABLET    Take 500 mg by mouth every 6 (six) hours as needed for Pain.       Start Date: --        End Date: --    BUPROPION (WELLBUTRIN XL) 150 MG TB24 TABLET    Take 150 mg by mouth every other day.       Start Date: 10/15/2021End Date: --    BUPROPION (WELLBUTRIN XL) 300 MG 24 HR TABLET    Take 300 mg by mouth every other day.       Start Date: 5/6/2022  End Date: --    DULOXETINE (CYMBALTA) 30 MG CAPSULE    Take 30 mg by mouth once daily.       Start Date: 11/9/2022 End Date: --    PNV NO.95/FERROUS FUM/FOLIC AC (PRENATAL MULTIVITAMINS ORAL)    Take 1 tablet  "by mouth once daily.       Start Date: --        End Date: --   Discontinued Medications    AZELASTINE (ASTELIN) 137 MCG (0.1 %) NASAL SPRAY    1 spray (137 mcg total) by Nasal route 2 (two) times daily.       Start Date: 11/10/2022End Date: 11/16/2022    FLUTICASONE PROPIONATE (FLONASE) 50 MCG/ACTUATION NASAL SPRAY    1 spray (50 mcg total) by Each Nostril route once daily.       Start Date: 11/10/2022End Date: 11/16/2022           REVIEW OF SYSTEMS      CONST: no F/C/NS, no unintentional weight changes  NEURO:  no tremor, no weakness  EYES: no discharge, no pruritus, no erythema  EARS: no hearing loss, + sensation of fullness  NOSE: + congestion, + rhinorrhea, + sneezing  PULM:  no SOB, no wheezing, + cough  CV: no CP, no palpitations, no leg swelling  GI:  no abdominal pain, no blood in stool  :  no dysuria, no hematuria  DERM: + rashes, no skin breaks    Objective:     Physical Exam:     Ht 5' 3" (1.6 m)   Wt 84 kg (185 lb 3 oz)   BMI 32.80 kg/m²   GEN: Awake and alert, no distress  DERM: No rashes or flushing  EYE:  No occular discharge  HEENT: No nasal discharge, no hoarseness, TMs are clear bilaterally.  Nares are pink with no significant turbinate swelling.  Oropharynx is benign without exudate.  Tongue is not coated.  PULM: Normal work of breathing, no cough, CTA  COR:  RRR, normal pulses  NEURO:  No focal deficit, speech fluent and logical  PSYCH: appropriate affect, normal behavior        Allergy Testing            Lab results      EO count 100, 2022      Imaging and other diagnostics            Medical records review   At initial view visit reviewed Internal Medicine note of 11/10/2022.      ASSESSMENT & PLAN       Diagnoses:     Desiree Salas is a 47 y.o. female. with  1. Chronic rhinitis    2. Allergy, initial encounter          Medical Decision making:     Ms. Chapin is presenting with progressive rhinitis and conjunctivitis that could be allergic in nature.  Diagnostically I recommend screening " for airborne allergens by the Immunocap method.  Because she does not like nose sprays and nasal congestion is 1 of her major symptoms, I recommend Sudafed urine without ibuprofen up to twice daily.  For her itchy/sneezy/runny nose symptoms, I recommend Zyrtec as needed.  I plan to see her back in 2 weeks.    Chronic rhinitis  -     IgE; Future; Expected date: 11/16/2022  -     Dermatophagoides [place]; Future; Expected date: 11/16/2022  -     Dermatophagoides Pteronyssinus; Future; Expected date: 11/16/2022  -     [place]; Future; Expected date: 11/16/2022  -     Ste[place]ane; Future; Expected date: 11/16/2022  -     [place]; Future; Expected date: 11/16/2022  -     English Plantain; Future; Expected date: 11/16/2022  -     [place] Pecan; Future; Expected date: 11/16/2022  -     Pecan; Future; Expected date: 11/16/2022  -     Ragweed; Future; Expected date: 11/16/2022  -     Alternaria; Future; Expected date: 11/16/2022  -     Aspergillus; Future; Expected date: 11/16/2022  -     Cat; Future; Expected date: 11/16/2022  -     Cockroach; Future; Expected date: 11/16/2022  -     Dog; Future; Expected date: 11/16/2022  -     [place] grass IgE; Future; Expected date: 11/16/2022  -     [place] grass IgE; Future; Expected date: 11/16/2022  -     pseudoephedrine (SUDAFED 12 HOUR) 120 mg 12 hr tablet; Take 1 tablet (120 mg total) by mouth 2 (two) times daily as needed for Congestion (or head pressure.). Take one in AM and one midday.  Dispense: 60 tablet; Refill: 1  -     cetirizine (ZYRTEC) 10 MG tablet; Take 1 tablet (10 mg total) by mouth once daily. May take an extra if needed.; Refill: 0    Allergy, initial encounter  -     Ambulatory referral/consult to Allergy        Plan:     Patient Instructions   Testing  Blood work for allergy testing today       Check [facility] in one week for results or call [phone]       Contact me with questions or concerns       I will contact you if anything needs immediate  attention.        Treatment    Sudafed (pseudoephedrine) as needed, up to twice a day    Zyrtec (cetirizine), up to 2 a day      Return about 2 weeks      Nadia Peguero MD  Allergy, Asthma and Immunology

## 2022-11-16 NOTE — PATIENT INSTRUCTIONS
Testing  Blood work for allergy testing today       Check [facility] in one week for results or call [phone]       Contact me with questions or concerns       I will contact you if anything needs immediate attention.        Treatment    Sudafed (pseudoephedrine) as needed, up to twice a day    Zyrtec (cetirizine), up to 2 a day      Return about 2 weeks

## 2022-11-17 LAB — IGE SERPL-ACNC: <35 IU/ML (ref 0–100)

## 2022-11-21 LAB
IGF-I SERPL-MCNC: 166 NG/ML (ref 49–240)
IGF-I Z-SCORE SERPL: 1.04 SD

## 2022-11-22 LAB
A ALTERNATA IGE QN: <0.1 KU/L
A FUMIGATUS IGE QN: <0.1 KU/L
BAHIA GRASS IGE QN: <0.1 KU/L
BERMUDA GRASS IGE QN: <0.1 KU/L
CAT DANDER IGE QN: 6.96 KU/L
CEDAR IGE QN: <0.1 KU/L
D FARINAE IGE QN: <0.1 KU/L
D PTERONYSS IGE QN: <0.1 KU/L
DEPRECATED A ALTERNATA IGE RAST QL: NORMAL
DEPRECATED A FUMIGATUS IGE RAST QL: NORMAL
DEPRECATED BAHIA GRASS IGE RAST QL: NORMAL
DEPRECATED BERMUDA GRASS IGE RAST QL: NORMAL
DEPRECATED CAT DANDER IGE RAST QL: ABNORMAL
DEPRECATED CEDAR IGE RAST QL: NORMAL
DEPRECATED D FARINAE IGE RAST QL: NORMAL
DEPRECATED D PTERONYSS IGE RAST QL: NORMAL
DEPRECATED DOG DANDER IGE RAST QL: ABNORMAL
DEPRECATED ENGL PLANTAIN IGE RAST QL: NORMAL
DEPRECATED JOHNSON GRASS IGE RAST QL: NORMAL
DEPRECATED PECAN/HICK TREE IGE RAST QL: NORMAL
DEPRECATED ROACH IGE RAST QL: NORMAL
DEPRECATED TIMOTHY IGE RAST QL: ABNORMAL
DEPRECATED WEST RAGWEED IGE RAST QL: NORMAL
DEPRECATED WHITE OAK IGE RAST QL: NORMAL
DOG DANDER IGE QN: 0.46 KU/L
ENGL PLANTAIN IGE QN: <0.1 KU/L
JOHNSON GRASS IGE QN: <0.1 KU/L
PECAN/HICK TREE IGE QN: <0.1 KU/L
ROACH IGE QN: <0.1 KU/L
TIMOTHY IGE QN: 0.11 KU/L
WEST RAGWEED IGE QN: <0.1 KU/L
WHITE OAK IGE QN: <0.1 KU/L

## 2022-12-06 ENCOUNTER — PATIENT MESSAGE (OUTPATIENT)
Dept: ALLERGY | Facility: CLINIC | Age: 47
End: 2022-12-06
Payer: COMMERCIAL

## 2022-12-20 ENCOUNTER — PATIENT MESSAGE (OUTPATIENT)
Dept: PRIMARY CARE CLINIC | Facility: CLINIC | Age: 47
End: 2022-12-20
Payer: COMMERCIAL

## 2023-01-03 ENCOUNTER — PATIENT MESSAGE (OUTPATIENT)
Dept: PRIMARY CARE CLINIC | Facility: CLINIC | Age: 48
End: 2023-01-03
Payer: COMMERCIAL

## 2023-01-06 ENCOUNTER — OFFICE VISIT (OUTPATIENT)
Dept: PRIMARY CARE CLINIC | Facility: CLINIC | Age: 48
End: 2023-01-06
Payer: COMMERCIAL

## 2023-01-06 ENCOUNTER — LAB VISIT (OUTPATIENT)
Dept: LAB | Facility: HOSPITAL | Age: 48
End: 2023-01-06
Attending: STUDENT IN AN ORGANIZED HEALTH CARE EDUCATION/TRAINING PROGRAM
Payer: COMMERCIAL

## 2023-01-06 VITALS
HEART RATE: 67 BPM | BODY MASS INDEX: 33.04 KG/M2 | SYSTOLIC BLOOD PRESSURE: 116 MMHG | OXYGEN SATURATION: 100 % | HEIGHT: 63 IN | TEMPERATURE: 99 F | WEIGHT: 186.5 LBS | DIASTOLIC BLOOD PRESSURE: 72 MMHG

## 2023-01-06 DIAGNOSIS — R19.7 DIARRHEA, UNSPECIFIED TYPE: ICD-10-CM

## 2023-01-06 DIAGNOSIS — R19.7 DIARRHEA, UNSPECIFIED TYPE: Primary | ICD-10-CM

## 2023-01-06 LAB
ALBUMIN SERPL BCP-MCNC: 4.1 G/DL (ref 3.5–5.2)
ALP SERPL-CCNC: 38 U/L (ref 55–135)
ALT SERPL W/O P-5'-P-CCNC: 18 U/L (ref 10–44)
ANION GAP SERPL CALC-SCNC: 7 MMOL/L (ref 8–16)
AST SERPL-CCNC: 20 U/L (ref 10–40)
BILIRUB SERPL-MCNC: 0.4 MG/DL (ref 0.1–1)
BUN SERPL-MCNC: 10 MG/DL (ref 6–20)
CALCIUM SERPL-MCNC: 9.3 MG/DL (ref 8.7–10.5)
CHLORIDE SERPL-SCNC: 107 MMOL/L (ref 95–110)
CO2 SERPL-SCNC: 26 MMOL/L (ref 23–29)
CREAT SERPL-MCNC: 1.1 MG/DL (ref 0.5–1.4)
EST. GFR  (NO RACE VARIABLE): >60 ML/MIN/1.73 M^2
GLUCOSE SERPL-MCNC: 88 MG/DL (ref 70–110)
HAV IGM SERPL QL IA: NORMAL
HBV CORE IGM SERPL QL IA: NORMAL
HBV SURFACE AG SERPL QL IA: NORMAL
HCV AB SERPL QL IA: NORMAL
POTASSIUM SERPL-SCNC: 4.8 MMOL/L (ref 3.5–5.1)
PROT SERPL-MCNC: 7 G/DL (ref 6–8.4)
SODIUM SERPL-SCNC: 140 MMOL/L (ref 136–145)

## 2023-01-06 PROCEDURE — 99213 OFFICE O/P EST LOW 20 MIN: CPT | Mod: S$GLB,,, | Performed by: STUDENT IN AN ORGANIZED HEALTH CARE EDUCATION/TRAINING PROGRAM

## 2023-01-06 PROCEDURE — 1159F PR MEDICATION LIST DOCUMENTED IN MEDICAL RECORD: ICD-10-PCS | Mod: CPTII,S$GLB,, | Performed by: STUDENT IN AN ORGANIZED HEALTH CARE EDUCATION/TRAINING PROGRAM

## 2023-01-06 PROCEDURE — 80074 ACUTE HEPATITIS PANEL: CPT | Performed by: STUDENT IN AN ORGANIZED HEALTH CARE EDUCATION/TRAINING PROGRAM

## 2023-01-06 PROCEDURE — 3074F SYST BP LT 130 MM HG: CPT | Mod: CPTII,S$GLB,, | Performed by: STUDENT IN AN ORGANIZED HEALTH CARE EDUCATION/TRAINING PROGRAM

## 2023-01-06 PROCEDURE — 1160F RVW MEDS BY RX/DR IN RCRD: CPT | Mod: CPTII,S$GLB,, | Performed by: STUDENT IN AN ORGANIZED HEALTH CARE EDUCATION/TRAINING PROGRAM

## 2023-01-06 PROCEDURE — 3074F PR MOST RECENT SYSTOLIC BLOOD PRESSURE < 130 MM HG: ICD-10-PCS | Mod: CPTII,S$GLB,, | Performed by: STUDENT IN AN ORGANIZED HEALTH CARE EDUCATION/TRAINING PROGRAM

## 2023-01-06 PROCEDURE — 80053 COMPREHEN METABOLIC PANEL: CPT | Performed by: STUDENT IN AN ORGANIZED HEALTH CARE EDUCATION/TRAINING PROGRAM

## 2023-01-06 PROCEDURE — 99999 PR PBB SHADOW E&M-EST. PATIENT-LVL IV: CPT | Mod: PBBFAC,,, | Performed by: STUDENT IN AN ORGANIZED HEALTH CARE EDUCATION/TRAINING PROGRAM

## 2023-01-06 PROCEDURE — 3008F BODY MASS INDEX DOCD: CPT | Mod: CPTII,S$GLB,, | Performed by: STUDENT IN AN ORGANIZED HEALTH CARE EDUCATION/TRAINING PROGRAM

## 2023-01-06 PROCEDURE — 3078F PR MOST RECENT DIASTOLIC BLOOD PRESSURE < 80 MM HG: ICD-10-PCS | Mod: CPTII,S$GLB,, | Performed by: STUDENT IN AN ORGANIZED HEALTH CARE EDUCATION/TRAINING PROGRAM

## 2023-01-06 PROCEDURE — 36415 COLL VENOUS BLD VENIPUNCTURE: CPT | Mod: PN | Performed by: STUDENT IN AN ORGANIZED HEALTH CARE EDUCATION/TRAINING PROGRAM

## 2023-01-06 PROCEDURE — 99213 PR OFFICE/OUTPT VISIT, EST, LEVL III, 20-29 MIN: ICD-10-PCS | Mod: S$GLB,,, | Performed by: STUDENT IN AN ORGANIZED HEALTH CARE EDUCATION/TRAINING PROGRAM

## 2023-01-06 PROCEDURE — 3008F PR BODY MASS INDEX (BMI) DOCUMENTED: ICD-10-PCS | Mod: CPTII,S$GLB,, | Performed by: STUDENT IN AN ORGANIZED HEALTH CARE EDUCATION/TRAINING PROGRAM

## 2023-01-06 PROCEDURE — 1160F PR REVIEW ALL MEDS BY PRESCRIBER/CLIN PHARMACIST DOCUMENTED: ICD-10-PCS | Mod: CPTII,S$GLB,, | Performed by: STUDENT IN AN ORGANIZED HEALTH CARE EDUCATION/TRAINING PROGRAM

## 2023-01-06 PROCEDURE — 99999 PR PBB SHADOW E&M-EST. PATIENT-LVL IV: ICD-10-PCS | Mod: PBBFAC,,, | Performed by: STUDENT IN AN ORGANIZED HEALTH CARE EDUCATION/TRAINING PROGRAM

## 2023-01-06 PROCEDURE — 3078F DIAST BP <80 MM HG: CPT | Mod: CPTII,S$GLB,, | Performed by: STUDENT IN AN ORGANIZED HEALTH CARE EDUCATION/TRAINING PROGRAM

## 2023-01-06 PROCEDURE — 1159F MED LIST DOCD IN RCRD: CPT | Mod: CPTII,S$GLB,, | Performed by: STUDENT IN AN ORGANIZED HEALTH CARE EDUCATION/TRAINING PROGRAM

## 2023-01-06 RX ORDER — LISDEXAMFETAMINE DIMESYLATE 50 MG/1
50 CAPSULE ORAL EVERY MORNING
COMMUNITY
Start: 2023-01-03 | End: 2023-09-13 | Stop reason: SDUPTHER

## 2023-01-06 NOTE — PROGRESS NOTES
Desiree Salas  1975        Subjective     Chief Complaint: Diarrhea    History of Present Illness:  Ms. Desiree Salas is a 47 y.o. female who presents to clinic for diarrhea.     Usually has diarrhea with cuycles but this time did not stop.     Started new hair vitamins. Included vitamin D, flaxseed, kelp, multiple unknown ingredients. Bought off [employer]. Stopped those which helped the diarrhea but still happening.     Watery stools almost daily. Same type of diarrhea as periods.     No fever/no vomiting. No blood in stools. Not cramping pain but aching. Able to eat and drink but appetite slightly lower.     No travel, no one else sick.     Now abdominal pain is gone but diarrhea still present.     No burning with urinating. No recent abx use.     Unsure about seafood or restaurants. Maybe.     No cough, congestion.     Night sweats for few months. Not every night. Once a week.     Cycles starting so maybe becoming irregular.    Review of Systems   Constitutional:  Negative for chills and fever.   HENT:  Negative for congestion and sore throat.    Respiratory:  Negative for cough, sputum production, shortness of breath and wheezing.    Cardiovascular:  Negative for chest pain.   Gastrointestinal:  Positive for abdominal pain and diarrhea. Negative for blood in stool, melena, nausea and vomiting.   Genitourinary:  Negative for dysuria, frequency, hematuria and urgency.   Musculoskeletal:  Negative for myalgias.   Neurological:  Negative for sensory change and speech change.      PAST HISTORY:     Past Medical History:   Diagnosis Date    ADHD (attention deficit hyperactivity disorder), inattentive type     Anxiety     Asthma     exercise induced; no inhaler use    Depression     Pituitary tumor        Past Surgical History:   Procedure Laterality Date    CYST REMOVAL  2016    groin area    DILATION AND CURETTAGE OF UTERUS  1998    due to heavy/irregular menses    EXCISION OF GANGLION OF WRIST Left 12/18/2018     Procedure: EXCISION, GANGLION CYST, WRIST LEFT;  Surgeon: Walter Joyner Jr., MD;  Location: [facility] OR;  Service: Plastics;  Laterality: Left;    MYOMECTOMY N/A 3/15/2019    Procedure: MYOMECTOMY OPEN;  Surgeon: Heena Castaneda MD;  Location: [facility] OR;  Service: OB/GYN;  Laterality: N/A;  Dr. Becerra to asst       Family History   Problem Relation Age of Onset    Alzheimer's disease Maternal Grandmother     Diabetes Father     Hypertension Father     Diabetes Mother     Cancer Brother         unsure of the type    Migraines Sister     Breast cancer Neg Hx        Social History     Socioeconomic History    Marital status: Single   Occupational History     Comment: Works at [employer] -    Tobacco Use    Smoking status: Former     Years: 2.00     Types: Cigarettes     Quit date: 2017     Years since quittin.5    Smokeless tobacco: Never    Tobacco comments:     1 pack in a week   Substance and Sexual Activity    Alcohol use: Not Currently    Drug use: Yes     Types: Marijuana     Comment: daily    Sexual activity: Yes     Partners: Male     Birth control/protection: Condom       MEDICATIONS & ALLERGIES:     Current Outpatient Medications on File Prior to Visit   Medication Sig    buPROPion (WELLBUTRIN XL) 150 MG TB24 tablet Take 150 mg by mouth every other day.    DULoxetine (CYMBALTA) 30 MG capsule Take 30 mg by mouth once daily.    PNV no.95/ferrous fum/folic ac (PRENATAL MULTIVITAMINS ORAL) Take 1 tablet by mouth once daily.    buPROPion (WELLBUTRIN XL) 300 MG 24 hr tablet Take 300 mg by mouth every other day.    VYVANSE 50 mg capsule Take 50 mg by mouth every morning.    [DISCONTINUED] acetaminophen (TYLENOL) 500 MG tablet Take 500 mg by mouth every 6 (six) hours as needed for Pain.    [DISCONTINUED] cetirizine (ZYRTEC) 10 MG tablet Take 1 tablet (10 mg total) by mouth once daily. May take an extra if needed.    [DISCONTINUED] pseudoephedrine (SUDAFED 12 HOUR) 120 mg 12 hr tablet Take 1 tablet  "(120 mg total) by mouth 2 (two) times daily as needed for Congestion (or head pressure.). Take one in AM and one midday.     No current facility-administered medications on file prior to visit.       Review of patient's allergies indicates:   Allergen Reactions    Grass pollen Other (See Comments)    House dust Itching       OBJECTIVE:     Vital Signs:  Vitals:    01/06/23 1034   BP: 116/72   BP Location: Left arm   Patient Position: Sitting   BP Method: Medium (Manual)   Pulse: 67   Temp: 98.8 °F (37.1 °C)   TempSrc: Oral   SpO2: 100%   Weight: 84.6 kg (186 lb 8.2 oz)   Height: 5' 3" (1.6 m)       Body mass index is 33.04 kg/m².     Physical Exam:  Physical Exam  Vitals and nursing note reviewed.   Constitutional:       General: She is not in acute distress.     Appearance: Normal appearance. She is not ill-appearing, toxic-appearing or diaphoretic.   HENT:      Head: Normocephalic and atraumatic.   Eyes:      General: No scleral icterus.  Pulmonary:      Effort: Pulmonary effort is normal. No respiratory distress.   Neurological:      Mental Status: She is alert and oriented to person, place, and time.   Psychiatric:         Mood and Affect: Mood and affect normal.         Behavior: Behavior normal.          Laboratory  Lab Results   Component Value Date    WBC 10.27 08/12/2022    HGB 12.1 08/12/2022    HCT 37.5 08/12/2022    MCV 85 08/12/2022     08/12/2022     Lab Results   Component Value Date     08/12/2022     08/12/2022    K 4.9 08/12/2022     08/12/2022    CO2 22 (L) 08/12/2022    BUN 11 08/12/2022    CREATININE 1.1 08/12/2022    CALCIUM 9.1 08/12/2022     No results found for: INR, PROTIME  Lab Results   Component Value Date    HGBA1C 5.3 06/07/2022             ASSESSMENT & PLAN:   Ms. Desiree Salas is a 47 y.o. female who was seen today in clinic for diarrhea. Suspect 2/2 excess flaxseed oil (+ saw palmetto and other unknown oils/plants) in the hair vitamin. Check hep panel and " kidney/liver function. Pt tolerating PO and diarrhea slowly improving. No fever or blood. Can trial pepto as needed. Offered stool studies but declined. If no improvement in next 1-2 weeks let me know. Avoid spicy foods, dairy until symptoms resolve. Avoid unregulated vitamins/supplements.       1. Diarrhea, unspecified type  -     Comprehensive Metabolic Panel; Future; Expected date: 01/06/2023  -     Hepatitis Panel, Acute; Future; Expected date: 01/06/2023         Mallika Galvan MD  Internal Medicine

## 2023-01-11 ENCOUNTER — PATIENT MESSAGE (OUTPATIENT)
Dept: PRIMARY CARE CLINIC | Facility: CLINIC | Age: 48
End: 2023-01-11
Payer: COMMERCIAL

## 2023-01-13 ENCOUNTER — PATIENT MESSAGE (OUTPATIENT)
Dept: OBSTETRICS AND GYNECOLOGY | Facility: CLINIC | Age: 48
End: 2023-01-13
Payer: COMMERCIAL

## 2023-01-26 ENCOUNTER — PATIENT MESSAGE (OUTPATIENT)
Dept: PRIMARY CARE CLINIC | Facility: CLINIC | Age: 48
End: 2023-01-26
Payer: COMMERCIAL

## 2023-01-26 DIAGNOSIS — Z12.11 ENCOUNTER FOR SCREENING COLONOSCOPY: Primary | ICD-10-CM

## 2023-03-27 ENCOUNTER — PATIENT OUTREACH (OUTPATIENT)
Dept: ADMINISTRATIVE | Facility: HOSPITAL | Age: 48
End: 2023-03-27
Payer: COMMERCIAL

## 2023-03-27 NOTE — PROGRESS NOTES
Patient due for the following    Colorectal Cancer Screening     Influenza Vaccine (1)      Immunizations: reviewed and updated  Care Everywhere: triggered  Care Teams: up to date  Outreach: none needed

## 2023-04-19 ENCOUNTER — PATIENT MESSAGE (OUTPATIENT)
Dept: RESEARCH | Facility: HOSPITAL | Age: 48
End: 2023-04-19
Payer: COMMERCIAL

## 2023-07-01 ENCOUNTER — PATIENT MESSAGE (OUTPATIENT)
Dept: PRIMARY CARE CLINIC | Facility: CLINIC | Age: 48
End: 2023-07-01
Payer: COMMERCIAL

## 2023-07-07 ENCOUNTER — CLINICAL SUPPORT (OUTPATIENT)
Dept: ENDOSCOPY | Facility: HOSPITAL | Age: 48
End: 2023-07-07
Payer: COMMERCIAL

## 2023-07-07 ENCOUNTER — TELEPHONE (OUTPATIENT)
Dept: ENDOSCOPY | Facility: HOSPITAL | Age: 48
End: 2023-07-07

## 2023-07-07 DIAGNOSIS — Z12.11 ENCOUNTER FOR SCREENING COLONOSCOPY: ICD-10-CM

## 2023-07-07 RX ORDER — POLYETHYLENE GLYCOL 3350, SODIUM SULFATE ANHYDROUS, SODIUM BICARBONATE, SODIUM CHLORIDE, POTASSIUM CHLORIDE 236; 22.74; 6.74; 5.86; 2.97 G/4L; G/4L; G/4L; G/4L; G/4L
4 POWDER, FOR SOLUTION ORAL ONCE
Qty: 4000 ML | Refills: 0 | Status: SHIPPED | OUTPATIENT
Start: 2023-07-07 | End: 2023-07-07

## 2023-07-07 NOTE — PLAN OF CARE
Spoke to pt to schedule procedure(s) Colonoscopy       Physician to perform procedure(s) Dr. ALEXANDER Salazar  Date of Procedure (s) 9/16/23  Arrival Time 9:50 AM  Time of Procedure(s) 10:50 AM   Location of Procedure(s) Main 4th floor  Type of Rx Prep sent to patient: PEG  Instructions provided to patient via Email    Patient was informed on the following information and verbalized understanding. Screening questionnaire reviewed with patient and complete. If procedure requires anesthesia, a responsible adult needs to be present to accompany the patient home, patient cannot drive after receiving anesthesia. Appointment details are tentative, especially check-in time. Patient will receive a prep-op call 4 days prior to confirm check-in time for procedure. If applicable the patient should contact their pharmacy to verify Rx for procedure prep is ready for pick-up. Patient was advised to call the scheduling department at [phone] if pharmacy states no Rx is available. Patient was advised to call the endoscopy scheduling department if any questions or concerns arise.      [facility] Scheduling Department

## 2023-07-07 NOTE — TELEPHONE ENCOUNTER
Spoke to pt to schedule procedure(s) Colonoscopy       Physician to perform procedure(s) Dr. ALEXANDER Salazar  Date of Procedure (s) 9/16/23  Arrival Time 9:50 AM  Time of Procedure(s) 10:50 AM   Location of Procedure(s) Main 4th floor  Type of Rx Prep sent to patient: PEG  Instructions provided to patient via Email    Patient was informed on the following information and verbalized understanding. Screening questionnaire reviewed with patient and complete. If procedure requires anesthesia, a responsible adult needs to be present to accompany the patient home, patient cannot drive after receiving anesthesia. Appointment details are tentative, especially check-in time. Patient will receive a prep-op call 4 days prior to confirm check-in time for procedure. If applicable the patient should contact their pharmacy to verify Rx for procedure prep is ready for pick-up. Patient was advised to call the scheduling department at [phone] if pharmacy states no Rx is available. Patient was advised to call the endoscopy scheduling department if any questions or concerns arise.       Endoscopy Scheduling Department         Colonoscopy Procedure Prep Instructions    Date of procedure: 9/16/23Arrive at: 9:50 AM    Location of Department:   [facility] [address] [place] [address]  Take the Atrium Elevators to 4th Floor Endoscopy Lab    As soon as possible:   your prep from pharmacy and over the counter DULCOLAX LAXATIVE TABLETS            On the day before your procedure   What You CAN do:   You may have clear liquids ONLY-see below for list.     Liquids That Are OK to Drink:   Water  Sports drinks (Gatorade, Power-Aid)  Coffee or tea (no cream or nondairy creamer)  Clear juices without pulp (apple, white grape)  Gelatin desserts (no fruit or toppings)  Clear soda (sprite, coke, ginger ale)  Chicken broth (until 12 midnight the night before procedure)    What You CANNOT do:   Do not EAT solid food,  drink milk or anything   colored red.  Do not drink alcohol.  Do not take oral medications within 1 hour of starting   each dose of prep.  No gum chewing or candy morning of procedure                       Note:   (Please disregard the insert instructions from pharmacy).  PEG Bowel Prep is indicated for cleansing of the colon as a preparation for colonoscopy in adults.   Be sure to tell your doctor about all the medicines you take, including prescription and non-prescription medicines, vitamins, and herbal supplements. PEG Bowel Prep may affect how other medicines work.  Medication taken by mouth may not be absorbed properly when taken within 1 hour before the start of each dose of PEG Bowel Prep.    It is not uncommon to experience some abdominal cramping, nausea and/or vomiting when taking the prep. If you have nausea and/or vomiting while taking the prep, stop drinking for 20 to 30 minutes then continue.      How to take prep:    PEG Bowel Prep is a (2-day) prep.    One (1) bottle of prep are required for a complete preparation for colonoscopy. Dilute the solution concentrate as directed prior to use. You must drink water with each dose of prep, and additional water after each dose.    DOSE 1--Day Before Colonoscopy 9/15/23     Drink at least 6 to 8 glasses of clear liquids from time you wake up until you begin your prep and then continue until bedtime to avoid dehydration.     12:00 pm (NOON) Mix your entire container of prep with lukewarm water and refrigerate. Take four (4) Dulcolax (Bisacodyl) tablets with at least 8 ounces or more of clear liquids.       6:00 pm:    You must complete Steps 1 and 2 below before going to bed:    Step 1-Drink half the liquid in the container within one (1) hour.   Step 2-Refrigerate the remaining half of the liquid for dose 2. See below when to begin this step.                       IMPORTANT: If you experience preparation-related symptoms (for example, nausea, bloating, or  cramping), stop, or slow the rate of drinking the additional water until your symptoms decrease.    DOSE 2--Day of the Colonoscopy 9/16/23 at 2-3 AM.    For this dose, repeat Step 1 shown above using the remaining half of the liquid prep.   You may continue drinking water/clear liquids until   4 hours before your colonoscopy or as directed by the scheduling nurse  6:30 AM.    For more information about your procedure, please watch this informational video.     Options for viewing:  Using a keyboard:  press and hold the control tab (Ctrl) and left mouse click to follow link          Colonoscopy Instructional Video                                                        OR    Type link address into your web browser's address bar:  [email or web address]/[email or web address]    Using a mobile phone: tap on web address/link.         IMPORTANT INFORMATION TO KNOW BEFORE YOUR PROCEDURE    [facility] [place] 4th Floor    If your procedure requires the administration of anesthesia, it is necessary for a responsible adult to drive you home. (Medical Transportation, Uber, Lyft, Taxi, etc. may ONLY be used if a responsible adult is present to accompany you home.  The responsible adult CAN'T be the  of the service).      person must be available to return to pick you up within 30 minutes of being notified of discharge.     Due to the limited socially distant seating in our waiting room, please limit your guest (1) who accompany you for this procedure. If someone accompanies you for this procedure into the facility:    Consider having them proceed to an area that is socially distant other than the lobby until a member of the medical team contacts them to provide an update after the procedure.     Also, please consider being dropped off and picked up from the facility.      Please bring a picture ID, insurance card, & copayment    Take Medications as directed below:    If you begin taking any blood  thinning medications, please contact the  listed below as soon as possible.    If you are diabetic see the attached instruction sheet regarding your medication.     If you take HEART, BLOOD PRESSURE, SEIZURE, PAIN, LUNG (including inhalers/nebulizers), ANTI-REJECTION (transplant patients), or PSYCHIATRIC medications, please take at your regular times with a sip of water or as directed by the scheduling nurse.     Important contact information:    Endoscopy [phone] Hours of operation Monday-Friday 8:00-4:30pm.    Questions about insurance or financial obligations call [phone] or [phone].    If you have questions regarding the prep or need to reschedule, please call [phone]. After hours questions requiring immediate assistance, contact [facility] nurse line at [phone] or [phone].   NOTE:     On occasion, unforeseen circumstances may cause a delay in your procedure start time. We respect your time and appreciate your patience during these circumstances.      Comments:

## 2023-07-11 ENCOUNTER — PATIENT MESSAGE (OUTPATIENT)
Dept: PRIMARY CARE CLINIC | Facility: CLINIC | Age: 48
End: 2023-07-11
Payer: COMMERCIAL

## 2023-09-11 ENCOUNTER — TELEPHONE (OUTPATIENT)
Dept: ENDOSCOPY | Facility: HOSPITAL | Age: 48
End: 2023-09-11
Payer: COMMERCIAL

## 2023-09-11 NOTE — TELEPHONE ENCOUNTER
Colonoscopy Procedure Prep Instructions    Date of procedure: 10/14/23 Arrive at: 7:00 am  Location of Department:   [facility] [address] [facility] [place] [address]  Take the [facility] Elevators to 4th Floor Endoscopy Lab    As soon as possible:   your prep from pharmacy and over the counter DULCOLAX LAXATIVE TABLETS            On the day before your procedure   What You CAN do:   You may have clear liquids ONLY-see below for list.     Liquids That Are OK to Drink:   Water  Sports drinks (Gatorade, Power-Aid)  Coffee or tea (no cream or nondairy creamer)  Clear juices without pulp (apple, white grape)  Gelatin desserts (no fruit or toppings)  Clear soda (sprite, coke, ginger ale)  Chicken broth (until 12 midnight the night before procedure)    What You CANNOT do:   Do not EAT solid food, drink milk or anything   colored red.  Do not drink alcohol.  Do not take oral medications within 1 hour of starting   each dose of prep.  No gum chewing or candy morning of procedure                       Note:   (Please disregard the insert instructions from pharmacy).  PEG Bowel Prep is indicated for cleansing of the colon as a preparation for colonoscopy in adults.   Be sure to tell your doctor about all the medicines you take, including prescription and non-prescription medicines, vitamins, and herbal supplements. PEG Bowel Prep may affect how other medicines work.  Medication taken by mouth may not be absorbed properly when taken within 1 hour before the start of each dose of PEG Bowel Prep.    It is not uncommon to experience some abdominal cramping, nausea and/or vomiting when taking the prep. If you have nausea and/or vomiting while taking the prep, stop drinking for 20 to 30 minutes then continue.      How to take prep:    PEG Bowel Prep is a (2-day) prep.    One (1) bottle of prep are required for a complete preparation for colonoscopy. Dilute the solution concentrate as directed prior to use.  You must drink water with each dose of prep, and additional water after each dose.    DOSE 1--Day Before Colonoscopy 10/13/23     Drink at least 6 to 8 glasses of clear liquids from time you wake up until you begin your prep and then continue until bedtime to avoid dehydration.     12:00 pm (NOON) Mix your entire container of prep with lukewarm water and refrigerate. Take four (4) Dulcolax (Bisacodyl) tablets with at least 8 ounces or more of clear liquids.       6:00 pm:    You must complete Steps 1 and 2 below before going to bed:    Step 1-Drink half the liquid in the container within one (1) hour.   Step 2-Refrigerate the remaining half of the liquid for dose 2. See below when to begin this step.                       IMPORTANT: If you experience preparation-related symptoms (for example, nausea, bloating, or cramping), stop, or slow the rate of drinking the additional water until your symptoms decrease.    DOSE 2--Day of the Colonoscopy 10/14/23 at 2-3 AM.    For this dose, repeat Step 1 shown above using the remaining half of the liquid prep.   You may continue drinking water/clear liquids until   4 hours before your colonoscopy or as directed by the scheduling nurse  4:00 AM.    For more information about your procedure, please watch this informational video. It is important to watch this animated consent video prior to your arrival.   If you haven't watched the video prior to arriving, you are required to watch it during admission which can cause delays.     Options for viewing:  Using a keyboard:  press and hold the control tab (Ctrl) and left mouse click to follow link          Colonoscopy Instructional Video                                                        OR    Type link address into your web browser's address bar:  [email or web address]/[email or web address]    Using a mobile phone: tap on web address/link.     Comments:              IMPORTANT INFORMATION TO KNOW BEFORE YOUR PROCEDURE    [facility] 4th Floor    If your procedure requires the administration of anesthesia, it is necessary for a responsible adult to drive you home. (Medical Transportation, Uber, Lyft, Taxi, etc. may ONLY be used if a responsible adult is present to accompany you home.  The responsible adult CAN'T be the  of the service).      person must be available to return to pick you up within 30 minutes of being notified of discharge.     Due to the limited socially distant seating in our waiting room, please limit your guest (1) who accompany you for this procedure. If someone accompanies you for this procedure into the facility:    Consider having them proceed to an area that is socially distant other than the lobby until a member of the medical team contacts them to provide an update after the procedure.     Also, please consider being dropped off and picked up from the facility.      Please bring a picture ID, insurance card, & copayment    Take Medications as directed below:    If you begin taking any blood thinning medications, please contact the  listed below as soon as possible.    If you are diabetic see the attached instruction sheet regarding your medication.     If you take HEART, BLOOD PRESSURE, SEIZURE, PAIN, LUNG (including inhalers/nebulizers), ANTI-REJECTION (transplant patients), or PSYCHIATRIC medications, please take at your regular times with a sip of water or as directed by the scheduling nurse.     Important contact information:    Endoscopy [phone] Hours of operation Monday-Friday 8:00-4:30pm.    Questions about insurance or financial obligations call [phone] or [phone].    If you have questions regarding the prep or need to reschedule, please call [phone]. After hours questions requiring immediate assistance, contact [facility] nurse line at [phone] or [phone].   NOTE:     On occasion, unforeseen circumstances  may cause a delay in your procedure start time. We respect your time and appreciate your patience during these circumstances.      Comments:         Are you ready for your Colonoscopy?      __ If you take blood thinners, have you stopped taking them according to your doctor's instructions before your procedures?  __ Have you stopped eating solid foods and followed a clear liquid diet a full day before your procedure or followed the diet       guidelines indicated in your instructions? REMINDER: NO BROTH AFTER MIDNIGHT THE DAY BEFORE PROCEDURE.   __ Have you completed all your prep solution? PLEASE DO NOT FOLLOW the insert/or box instructions from pharmacy)  __ Have you taken your blood pressure, heart, seizure, or other essential medications the morning of your procedure?  __ Have you planned for a ride to and from procedure?  (Medical Transportation, Uber, Lyft, Taxi, etc. may ONLY be used if a responsible adult is present to accompany you home). The responsible adult CANNOT be the  of the service.       Questions or Concerns?  Please call us!  [phone] (M-F) [phone] (Nights and weekends)    Listed below are some helpful tips:    Please bring protective cases for eyewear and hearing aids-wear comfortable clothing/shoes.  Follow prep instructions closely so you don't have to do it twice.  Bowel prep is prescription used to clean out the colon before a colonoscopy. The prep increases movement of your colon by causing you to have diarrhea (loose stools). Cleaning out stool from the colon helps your doctor to see in your colon clearly during this procedure.   It is important to stay hydrated before, during and after bowel prep to prevent loss of fluid (dehydration). You can have water and your choice of clear liquids. Reminder: these liquids you will drink in addition to the bowel prep.     Preparing the mixture:    First, mix the prep with water.  Make the taste better by adding a sugar free drink mix  (Crystal Light) can improve the taste of your prep.   Use a large bore (opening) straw. Place towards the back of mouth (throat) as tolerated.  Prepare a prep mixture that is lightly chilled, but not ice-cold. Drinking a large amount of ice-cold liquid can make you feel very ill.  Avoid drinking any RED beverages or eating popsicles with this color for the 24 hours leading up to your procedure. This color can look like blood in the colon.    Consuming the prep:    Take your time. If you feel ill, take a 15-minute break from drinking the prep mixture  Combat hunger and dehydration with clear liquids. Options like JELL-O, or popsicles will help.  Settle in with good reading material. The goal is to clean out 6 feet of colon, so you can plan on spending a good deal of time in the bathroom. Have some good reading material on-hand or an iPad ready to keep yourself entertained!  Keep yourself comfortable. We recommend applying personal hygiene wipes, Tucks pads and a soothing ointment, like A&D ointment, Desitin, or Vaseline to your bottom before starting and as needed to protect your skin.

## 2023-09-11 NOTE — TELEPHONE ENCOUNTER
Spoke to patient to reschedule procedure(s) Colonoscopy       Physician to perform procedure(s) Dr. LOLIS Poole  Date of Procedure (s) 10/14/23  Arrival Time 7:00 AM  Time of Procedure(s) 8:00 AM   Location of Procedure(s) [place] 4th Floor  Type of Rx Prep sent to patient: PEG  Instructions provided to patient via Email    Patient was informed on the following information and verbalized understanding. Screening questionnaire reviewed with patient and complete. If procedure requires anesthesia, a responsible adult needs to be present to accompany the patient home, patient cannot drive after receiving anesthesia. Appointment details are tentative, especially check-in time. Patient will receive a prep-op call 4 days prior to confirm check-in time for procedure. If applicable the patient should contact their pharmacy to verify Rx for procedure prep is ready for pick-up. Patient was advised to call the scheduling department at [phone] if pharmacy states no Rx is available. Patient was advised to call the endoscopy scheduling department if any questions or concerns arise.      [facility]oscopy Scheduling Department

## 2023-09-13 ENCOUNTER — OFFICE VISIT (OUTPATIENT)
Dept: PRIMARY CARE CLINIC | Facility: CLINIC | Age: 48
End: 2023-09-13
Payer: COMMERCIAL

## 2023-09-13 ENCOUNTER — PATIENT MESSAGE (OUTPATIENT)
Dept: ADMINISTRATIVE | Facility: HOSPITAL | Age: 48
End: 2023-09-13
Payer: COMMERCIAL

## 2023-09-13 VITALS
BODY MASS INDEX: 30.86 KG/M2 | WEIGHT: 174.19 LBS | SYSTOLIC BLOOD PRESSURE: 120 MMHG | HEIGHT: 63 IN | DIASTOLIC BLOOD PRESSURE: 76 MMHG | HEART RATE: 80 BPM | OXYGEN SATURATION: 100 %

## 2023-09-13 DIAGNOSIS — H53.8 BLURRY VISION: ICD-10-CM

## 2023-09-13 DIAGNOSIS — R53.83 FATIGUE, UNSPECIFIED TYPE: ICD-10-CM

## 2023-09-13 DIAGNOSIS — R42 DIZZINESS: ICD-10-CM

## 2023-09-13 DIAGNOSIS — F32.A ANXIETY AND DEPRESSION: ICD-10-CM

## 2023-09-13 DIAGNOSIS — Z00.00 ANNUAL PHYSICAL EXAM: Primary | ICD-10-CM

## 2023-09-13 DIAGNOSIS — Z12.31 ENCOUNTER FOR SCREENING MAMMOGRAM FOR MALIGNANT NEOPLASM OF BREAST: ICD-10-CM

## 2023-09-13 DIAGNOSIS — R79.89 ELEVATED PROLACTIN LEVEL: ICD-10-CM

## 2023-09-13 DIAGNOSIS — H43.392 VITREOUS FLOATERS OF LEFT EYE: ICD-10-CM

## 2023-09-13 DIAGNOSIS — F41.9 ANXIETY AND DEPRESSION: ICD-10-CM

## 2023-09-13 DIAGNOSIS — R26.89 BALANCE PROBLEM: ICD-10-CM

## 2023-09-13 DIAGNOSIS — F90.0 ATTENTION DEFICIT HYPERACTIVITY DISORDER (ADHD), PREDOMINANTLY INATTENTIVE TYPE: ICD-10-CM

## 2023-09-13 DIAGNOSIS — D49.7 PITUITARY TUMOR: ICD-10-CM

## 2023-09-13 PROCEDURE — 3008F PR BODY MASS INDEX (BMI) DOCUMENTED: ICD-10-PCS | Mod: CPTII,S$GLB,, | Performed by: STUDENT IN AN ORGANIZED HEALTH CARE EDUCATION/TRAINING PROGRAM

## 2023-09-13 PROCEDURE — 99215 OFFICE O/P EST HI 40 MIN: CPT | Mod: S$GLB,,, | Performed by: STUDENT IN AN ORGANIZED HEALTH CARE EDUCATION/TRAINING PROGRAM

## 2023-09-13 PROCEDURE — 1160F PR REVIEW ALL MEDS BY PRESCRIBER/CLIN PHARMACIST DOCUMENTED: ICD-10-PCS | Mod: CPTII,S$GLB,, | Performed by: STUDENT IN AN ORGANIZED HEALTH CARE EDUCATION/TRAINING PROGRAM

## 2023-09-13 PROCEDURE — 3074F SYST BP LT 130 MM HG: CPT | Mod: CPTII,S$GLB,, | Performed by: STUDENT IN AN ORGANIZED HEALTH CARE EDUCATION/TRAINING PROGRAM

## 2023-09-13 PROCEDURE — 99999 PR PBB SHADOW E&M-EST. PATIENT-LVL V: ICD-10-PCS | Mod: PBBFAC,,, | Performed by: STUDENT IN AN ORGANIZED HEALTH CARE EDUCATION/TRAINING PROGRAM

## 2023-09-13 PROCEDURE — 3008F BODY MASS INDEX DOCD: CPT | Mod: CPTII,S$GLB,, | Performed by: STUDENT IN AN ORGANIZED HEALTH CARE EDUCATION/TRAINING PROGRAM

## 2023-09-13 PROCEDURE — 1159F MED LIST DOCD IN RCRD: CPT | Mod: CPTII,S$GLB,, | Performed by: STUDENT IN AN ORGANIZED HEALTH CARE EDUCATION/TRAINING PROGRAM

## 2023-09-13 PROCEDURE — 3078F PR MOST RECENT DIASTOLIC BLOOD PRESSURE < 80 MM HG: ICD-10-PCS | Mod: CPTII,S$GLB,, | Performed by: STUDENT IN AN ORGANIZED HEALTH CARE EDUCATION/TRAINING PROGRAM

## 2023-09-13 PROCEDURE — 3078F DIAST BP <80 MM HG: CPT | Mod: CPTII,S$GLB,, | Performed by: STUDENT IN AN ORGANIZED HEALTH CARE EDUCATION/TRAINING PROGRAM

## 2023-09-13 PROCEDURE — 1159F PR MEDICATION LIST DOCUMENTED IN MEDICAL RECORD: ICD-10-PCS | Mod: CPTII,S$GLB,, | Performed by: STUDENT IN AN ORGANIZED HEALTH CARE EDUCATION/TRAINING PROGRAM

## 2023-09-13 PROCEDURE — 99999 PR PBB SHADOW E&M-EST. PATIENT-LVL V: CPT | Mod: PBBFAC,,, | Performed by: STUDENT IN AN ORGANIZED HEALTH CARE EDUCATION/TRAINING PROGRAM

## 2023-09-13 PROCEDURE — 1160F RVW MEDS BY RX/DR IN RCRD: CPT | Mod: CPTII,S$GLB,, | Performed by: STUDENT IN AN ORGANIZED HEALTH CARE EDUCATION/TRAINING PROGRAM

## 2023-09-13 PROCEDURE — 3074F PR MOST RECENT SYSTOLIC BLOOD PRESSURE < 130 MM HG: ICD-10-PCS | Mod: CPTII,S$GLB,, | Performed by: STUDENT IN AN ORGANIZED HEALTH CARE EDUCATION/TRAINING PROGRAM

## 2023-09-13 PROCEDURE — 99215 PR OFFICE/OUTPT VISIT, EST, LEVL V, 40-54 MIN: ICD-10-PCS | Mod: S$GLB,,, | Performed by: STUDENT IN AN ORGANIZED HEALTH CARE EDUCATION/TRAINING PROGRAM

## 2023-09-13 RX ORDER — DULOXETIN HYDROCHLORIDE 30 MG/1
30 CAPSULE, DELAYED RELEASE ORAL DAILY
Qty: 90 CAPSULE | Refills: 0 | Status: SHIPPED | OUTPATIENT
Start: 2023-09-13 | End: 2023-11-26 | Stop reason: SDUPTHER

## 2023-09-13 RX ORDER — PROGESTERONE 200 MG/1
200 CAPSULE ORAL NIGHTLY
COMMUNITY

## 2023-09-13 RX ORDER — ACETAMINOPHEN AND DIPHENHYDRAMINE HYDROCHLORIDE 500; 25 MG/1; MG/1
1 TABLET, FILM COATED ORAL NIGHTLY PRN
COMMUNITY

## 2023-09-13 RX ORDER — BUPROPION HYDROCHLORIDE 150 MG/1
150 TABLET ORAL EVERY OTHER DAY
Qty: 90 TABLET | Refills: 3 | Status: SHIPPED | OUTPATIENT
Start: 2023-09-13

## 2023-09-13 RX ORDER — LISDEXAMFETAMINE DIMESYLATE 50 MG/1
50 CAPSULE ORAL EVERY MORNING
Qty: 30 CAPSULE | Refills: 0 | Status: SHIPPED | OUTPATIENT
Start: 2023-09-13 | End: 2023-10-23 | Stop reason: SDUPTHER

## 2023-09-13 NOTE — PATIENT INSTRUCTIONS
"[facility] [phone]---> you will have to call to schedule.    "[facility]" for Therapy    For a private-practice Psychiatry option in [place]:    [facility]   [address]  Office-[phone]  [email or web address]    Can call to see if insurance will cover or can try out-of-pocket    You are also welcome to use [facility], [facility], , [place], or [facility] (any of these could potentially be covered by insurance, have to call and ask).     You can also seek out other private practice Psychiatrist options in the city.   "

## 2023-09-13 NOTE — PROGRESS NOTES
"Desiree Salas  1975        Subjective     Chief Complaint: Annual    History of Present Illness:  Ms. Desiree Salas is a 48 y.o. female who presents to clinic for annual.     Anxiety/Depression--> was being seen at [facility]. Had her Psychiatrist. Reports her team has changed, therapist is now . On Vyvanse 50 mg. Now can't get refills because her Psychiatrist left. Vyvanse helps keep things on track. Stressed at work.     Was doing IUI with [facility] in [place]. Dr. Borja.     Reports concern for recent vertigo. Had elevator in the building was out, had to take the stairs. Occurred around June 30th. When she climbs stairs feels like she is doing to fall backwards. Worse in the middle of the staircase. No spinning sensation.+off balance. Has not fallen but getting more frequent. Sometimes has some hearing loss.  Sometimes happens with heights. Happens on balcony as well. Feels like she is going to fall.     Has hx of elevated prolactin/prolactinoma. Was seeing Dr. Bruce in [facility]. Was prescribed  but never started the cabergoline.    Per that note, "She was seen by Dr. Jones on 6/14/22 at which time prior medical records were not available but she was noted to have elevated prolactin of 99 and 60 on infertility evaluation with pituitary MRI showing 2.5 mm hypoenhancement.  She has MRA schedule for possible vascular anomaly to be followed by Dr. Jones.      Initial presentation: Noted to have elevated prolactin on fertility workup.  Has been seeing fertility specialist and had had several rounds of injection treatments with what patient describes as IUI but has not been able to become pregnant. "    Had a mammogram at [facility]. Was told normal, dense tissue. Marker on the left breast. No re-read. No U/S. Will upload reports.    Brother (1/2 brother) had cancer, unsure which type. Maybe around 2000.   No colon cancer that she know of in the family. Colonoscopy scheduled " 10/2023.      Review of Systems   Constitutional:  Positive for malaise/fatigue. Negative for chills and fever.   HENT:  Positive for hearing loss.    Eyes:  Negative for discharge.   Respiratory:  Negative for wheezing.    Cardiovascular:  Negative for chest pain and palpitations.   Gastrointestinal:  Negative for blood in stool, constipation, diarrhea and vomiting.   Genitourinary:  Negative for dysuria and hematuria.   Musculoskeletal:  Negative for neck pain.   Neurological:  Positive for weakness and headaches.   Endo/Heme/Allergies:  Negative for polydipsia.   Psychiatric/Behavioral:  The patient is not nervous/anxious.         PAST HISTORY:     Past Medical History:   Diagnosis Date    ADHD (attention deficit hyperactivity disorder), inattentive type     Anxiety     Asthma     exercise induced; no inhaler use    Depression     Pituitary tumor        Past Surgical History:   Procedure Laterality Date    CYST REMOVAL  2016    groin area    DILATION AND CURETTAGE OF UTERUS  1998    due to heavy/irregular menses    EXCISION OF GANGLION OF WRIST Left 12/18/2018    Procedure: EXCISION, GANGLION CYST, WRIST LEFT;  Surgeon: Walter Joyner Jr., MD;  Location: [facility] OR;  Service: Plastics;  Laterality: Left;    MYOMECTOMY N/A 3/15/2019    Procedure: MYOMECTOMY OPEN;  Surgeon: Heena Castaneda MD;  Location: [facility];  Service: OB/GYN;  Laterality: N/A;  Dr. Becerra to asst       Family History   Problem Relation Age of Onset    Alzheimer's disease Maternal Grandmother     Diabetes Father     Hypertension Father     Diabetes Mother     Cancer Brother         unsure of the type    Migraines Sister     Breast cancer Neg Hx        Social History     Socioeconomic History    Marital status: Single   Occupational History     Comment: Works at [employer] -    Tobacco Use    Smoking status: Former     Current packs/day: 0.00     Types: Cigarettes     Start date: 7/1/2015     Quit date: 7/1/2017     Years since  "quittin.2    Smokeless tobacco: Never    Tobacco comments:     1 pack in a week   Substance and Sexual Activity    Alcohol use: Not Currently    Drug use: Yes     Types: Marijuana     Comment: daily    Sexual activity: Yes     Partners: Male     Birth control/protection: Condom     Social Determinants of Health     Physical Activity: Unknown (2021)    Exercise Vital Sign     Days of Exercise per Week: 0 days       MEDICATIONS & ALLERGIES:     Current Outpatient Medications on File Prior to Visit   Medication Sig    diphenhydrAMINE-acetaminophen (TYLENOL PM EXTRA STRENGTH)  mg Tab Take 1 tablet by mouth nightly as needed.    progesterone (PROMETRIUM) 200 MG capsule Take 200 mg by mouth every evening.    [DISCONTINUED] buPROPion (WELLBUTRIN XL) 150 MG TB24 tablet Take 150 mg by mouth every other day.    [DISCONTINUED] DULoxetine (CYMBALTA) 30 MG capsule Take 30 mg by mouth once daily.    [DISCONTINUED] VYVANSE 50 mg capsule Take 50 mg by mouth every morning.    [DISCONTINUED] PNV no.95/ferrous fum/folic ac (PRENATAL MULTIVITAMINS ORAL) Take 1 tablet by mouth once daily.     No current facility-administered medications on file prior to visit.       Review of patient's allergies indicates:   Allergen Reactions    Grass pollen Other (See Comments)    House dust Itching       OBJECTIVE:     Vital Signs:  Vitals:    23 1413   BP: 120/76   BP Location: Left arm   Patient Position: Sitting   BP Method: Medium (Manual)   Pulse: 80   SpO2: 100%   Weight: 79 kg (174 lb 2.6 oz)   Height: 5' 3" (1.6 m)       Body mass index is 30.85 kg/m².     Physical Exam:  Physical Exam  Vitals and nursing note reviewed.   Constitutional:       General: She is not in acute distress.     Appearance: Normal appearance. She is not ill-appearing, toxic-appearing or diaphoretic.   HENT:      Head: Normocephalic and atraumatic.      Right Ear: Tympanic membrane, ear canal and external ear normal.      Left Ear: Tympanic membrane, " "ear canal and external ear normal.   Eyes:      General: No scleral icterus.        Right eye: No discharge.         Left eye: No discharge.      Extraocular Movements: Extraocular movements intact.      Conjunctiva/sclera: Conjunctivae normal.      Comments: No nystagmus  No peripheral vision changes   Cardiovascular:      Rate and Rhythm: Normal rate and regular rhythm.      Heart sounds: No murmur heard.  Pulmonary:      Effort: Pulmonary effort is normal.      Breath sounds: Normal breath sounds. No wheezing.   Abdominal:      Tenderness: There is no right CVA tenderness or left CVA tenderness.   Musculoskeletal:         General: No swelling. Normal range of motion.      Cervical back: Normal range of motion and neck supple. No rigidity.      Right lower leg: No edema.      Left lower leg: No edema.   Lymphadenopathy:      Cervical: No cervical adenopathy.   Skin:     General: Skin is warm and dry.   Neurological:      General: No focal deficit present.      Mental Status: She is alert and oriented to person, place, and time.      Sensory: No sensory deficit.      Motor: No weakness.   Psychiatric:         Mood and Affect: Affect normal.            Laboratory  Lab Results   Component Value Date    WBC 10.27 08/12/2022    HGB 12.1 08/12/2022    HCT 37.5 08/12/2022    MCV 85 08/12/2022     08/12/2022     Lab Results   Component Value Date    GLU 88 01/06/2023     01/06/2023    K 4.8 01/06/2023     01/06/2023    CO2 26 01/06/2023    BUN 10 01/06/2023    CREATININE 1.1 01/06/2023    CALCIUM 9.3 01/06/2023     No results found for: "INR", "PROTIME"  Lab Results   Component Value Date    HGBA1C 5.3 06/07/2022           Health Maintenance         Date Due Completion Date    Colorectal Cancer Screening Never done ---    Influenza Vaccine (1) 09/01/2023 10/25/2018    Mammogram 06/30/2024 6/28/2022    Override on 2/12/2019: Done    Override on 8/18/2017: Done    Override on 5/7/2017: Done    Hemoglobin " A1c (Diabetic Prevention Screening) 06/07/2025 6/7/2022    Cervical Cancer Screening 10/08/2026 10/8/2021    Lipid Panel 10/13/2027 10/13/2022    TETANUS VACCINE 06/07/2032 6/7/2022                ASSESSMENT & PLAN:   Ms. Desiree Salas is a 48 y.o. female who was seen today in clinic for annual, other concerns.  Missed follow up with endo for prolactinoma.  We will reorder labs and try and help get scheduled with Endocrine.  Having blurry vision but denies visual field impairments.  Still working with fertility clinic.  Did not take the cabergoline ordered last year.  Consider repeat MRI given her possible dizziness.  Will also refer to ENT for hearing loss and dizziness evaluation.  She recently lost her psychiatrist, needs referrals for new ones.  Will refill her medications for continuity of care.   reviewed.      1. Annual physical exam  -     CBC Auto Differential; Future; Expected date: 09/13/2023  -     Comprehensive Metabolic Panel; Future; Expected date: 09/13/2023  -     Hemoglobin A1C; Future; Expected date: 09/13/2023  -     Lipid Panel; Future; Expected date: 09/13/2023  -     TSH; Future; Expected date: 09/13/2023    2. Blurry vision  -     Ambulatory referral/consult to Optometry; Future; Expected date: 09/20/2023    3. Vitreous floaters of left eye  -     Ambulatory referral/consult to Optometry; Future; Expected date: 09/20/2023    4. Anxiety and depression  -     Ambulatory referral/consult to Psychiatry; Future; Expected date: 09/20/2023  -     Ambulatory referral/consult to Psychology; Future; Expected date: 09/20/2023  -     DULoxetine (CYMBALTA) 30 MG capsule; Take 1 capsule (30 mg total) by mouth once daily.  Dispense: 90 capsule; Refill: 0  -     buPROPion (WELLBUTRIN XL) 150 MG TB24 tablet; Take 1 tablet (150 mg total) by mouth every other day.  Dispense: 90 tablet; Refill: 3    5. Attention deficit hyperactivity disorder (ADHD), predominantly inattentive type  -     Ambulatory  referral/consult to Psychiatry; Future; Expected date: 09/20/2023  -     Ambulatory referral/consult to Psychology; Future; Expected date: 09/20/2023  -     VYVANSE 50 mg capsule; Take 1 capsule (50 mg total) by mouth every morning.  Dispense: 30 capsule; Refill: 0    6. Encounter for screening mammogram for malignant neoplasm of breast    7. Dizziness  -     Ambulatory referral/consult to ENT; Future; Expected date: 09/20/2023  -     PROLACTIN; Future; Expected date: 09/13/2023  -     CBC Auto Differential; Future; Expected date: 09/13/2023  -     Comprehensive Metabolic Panel; Future; Expected date: 09/13/2023  -     Hemoglobin A1C; Future; Expected date: 09/13/2023  -     Lipid Panel; Future; Expected date: 09/13/2023  -     TSH; Future; Expected date: 09/13/2023  -     IRON AND TIBC; Future; Expected date: 09/13/2023  -     Ferritin; Future  -     VITAMIN B12; Future; Expected date: 09/13/2023  -     Insulin-like growth factor; Future; Expected date: 09/13/2023  -     FOLLICLE STIMULATING HORMONE; Future; Expected date: 09/13/2023    8. Balance problem  -     Ambulatory referral/consult to ENT; Future; Expected date: 09/20/2023  -     PROLACTIN; Future; Expected date: 09/13/2023  -     CBC Auto Differential; Future; Expected date: 09/13/2023  -     Comprehensive Metabolic Panel; Future; Expected date: 09/13/2023  -     Hemoglobin A1C; Future; Expected date: 09/13/2023  -     Lipid Panel; Future; Expected date: 09/13/2023  -     TSH; Future; Expected date: 09/13/2023  -     IRON AND TIBC; Future; Expected date: 09/13/2023  -     Ferritin; Future  -     VITAMIN B12; Future; Expected date: 09/13/2023  -     Insulin-like growth factor; Future; Expected date: 09/13/2023  -     FOLLICLE STIMULATING HORMONE; Future; Expected date: 09/13/2023    9. Elevated prolactin level  -     Ambulatory referral/consult to Endocrinology; Future; Expected date: 09/20/2023  -     PROLACTIN; Future; Expected date: 09/13/2023  -     CBC  Auto Differential; Future; Expected date: 09/13/2023  -     Comprehensive Metabolic Panel; Future; Expected date: 09/13/2023  -     Hemoglobin A1C; Future; Expected date: 09/13/2023  -     Lipid Panel; Future; Expected date: 09/13/2023  -     TSH; Future; Expected date: 09/13/2023  -     IRON AND TIBC; Future; Expected date: 09/13/2023  -     Ferritin; Future  -     VITAMIN B12; Future; Expected date: 09/13/2023  -     Insulin-like growth factor; Future; Expected date: 09/13/2023  -     FOLLICLE STIMULATING HORMONE; Future; Expected date: 09/13/2023    10. Pituitary tumor  -     Ambulatory referral/consult to Endocrinology; Future; Expected date: 09/20/2023  -     PROLACTIN; Future; Expected date: 09/13/2023  -     CBC Auto Differential; Future; Expected date: 09/13/2023  -     Comprehensive Metabolic Panel; Future; Expected date: 09/13/2023  -     Hemoglobin A1C; Future; Expected date: 09/13/2023  -     Lipid Panel; Future; Expected date: 09/13/2023  -     TSH; Future; Expected date: 09/13/2023  -     IRON AND TIBC; Future; Expected date: 09/13/2023  -     Ferritin; Future  -     VITAMIN B12; Future; Expected date: 09/13/2023  -     Insulin-like growth factor; Future; Expected date: 09/13/2023  -     FOLLICLE STIMULATING HORMONE; Future; Expected date: 09/13/2023    11. Fatigue, unspecified type  -     PROLACTIN; Future; Expected date: 09/13/2023  -     CBC Auto Differential; Future; Expected date: 09/13/2023  -     Comprehensive Metabolic Panel; Future; Expected date: 09/13/2023  -     Hemoglobin A1C; Future; Expected date: 09/13/2023  -     Lipid Panel; Future; Expected date: 09/13/2023  -     TSH; Future; Expected date: 09/13/2023  -     IRON AND TIBC; Future; Expected date: 09/13/2023  -     Ferritin; Future  -     VITAMIN B12; Future; Expected date: 09/13/2023  -     Insulin-like growth factor; Future; Expected date: 09/13/2023  -     FOLLICLE STIMULATING HORMONE; Future; Expected date: 09/13/2023  -      Ambulatory referral/consult to Sleep Disorders; Future; Expected date: 09/20/2023           Mallika Galvan MD  Internal Medicine         Portions of this note may have been generated using voice recognition software.  Please excuse any spelling/grammatical errors. Occasional wrong-word or sound-a-like substitutions may have also occurred due to the inherent limitations of voice recognition software. Please read the chart carefully and recognize, using context, where substitutions have occurred.    Answers submitted by the patient for this visit:  Review of Systems Questionnaire (Submitted on 9/13/2023)  activity change: Yes  unexpected weight change: No  rhinorrhea: No  trouble swallowing: No  visual disturbance: Yes  chest tightness: No  polyuria: No  difficulty urinating: No  menstrual problem: Yes  joint swelling: No  arthralgias: Yes  confusion: Yes  dysphoric mood: Yes      Time spent in the evaluation and management of this patient exceeded 40min and greater than 50% of this time was in face-to-face time with the patient on day of the clinic visit.   This includes face-to-face time and non face-to-face time and includes the following:  --preparing to see the patient (eg, obtaining and/or reviewing old records such as, when applicable, primary care notes, specialist notes, hospital notes, review of laboratory tests, and/or radiographic and/or cardiology or other studies)  --performing a medically appropriate review of systems and examination and/or evaluation  --reviewing and independently interpreting results (not separately reported; eg, lab results) and communicating results to the patient and/or family/caregiver  --placing orders and/or reviewing other physician's orders which can both include medications, laboratory studies, radiographic studies, procedures, referrals etcetera and   --counseling and educating the patient and/or family member/caregiver regarding the treatment plan  --documentation of the  visit in the electronic health record  --communicating with other health care providers regarding referrals, studies, follow-up, etc

## 2023-09-14 ENCOUNTER — PATIENT OUTREACH (OUTPATIENT)
Dept: ADMINISTRATIVE | Facility: HOSPITAL | Age: 48
End: 2023-09-14
Payer: COMMERCIAL

## 2023-09-14 NOTE — PROGRESS NOTES
Patient due for the following    Colorectal Cancer Screening     Influenza Vaccine (1)      E-faxed [facility] for mammogram records.    Immunizations: reviewed and updated  Care Everywhere: triggered  Care Teams: up to date  Outreach: none needed

## 2023-09-14 NOTE — LETTER
AUTHORIZATION FOR RELEASE OF   CONFIDENTIAL INFORMATION    Dear [facility],    We are seeing Desiree Salas, date of birth 1975, in the clinic at [facility]. Mallika Galvan MD is the patient's PCP. Desiree Salas has an outstanding lab/procedure at the time we reviewed her chart. In order to help keep her health information updated, she has authorized us to request the following medical record(s):        ( X )  MAMMOGRAM                                      (  )  COLONOSCOPY      (  )  PAP SMEAR                                          (  )  OUTSIDE LAB RESULTS     (  )  DEXA SCAN                                          (  )  EYE EXAM            (  )  FOOT EXAM                                          (  )  ENTIRE RECORD     (  )  OUTSIDE IMMUNIZATIONS                 (  )  _______________         Please fax records to [facility], Amjed, Saira, MD, [phone]     If you have any questions, please contact Tram at [phone].           Patient Name: Desiree Salas  : 1975  Patient Phone #: [phone]

## 2023-09-15 ENCOUNTER — TELEPHONE (OUTPATIENT)
Dept: OTOLARYNGOLOGY | Facility: CLINIC | Age: 48
End: 2023-09-15
Payer: COMMERCIAL

## 2023-09-15 ENCOUNTER — TELEPHONE (OUTPATIENT)
Dept: ENDOCRINOLOGY | Facility: CLINIC | Age: 48
End: 2023-09-15
Payer: COMMERCIAL

## 2023-09-15 NOTE — TELEPHONE ENCOUNTER
----- Message from Milvia Rubalcava sent at 9/15/2023 10:51 AM CDT -----  Dr. Mallika Galvan has put in a referral for Consult to ENT. Please assist in scheduling.    Dizziness [R42]  Balance problem [R26.89]    Thanks

## 2023-09-18 ENCOUNTER — LAB VISIT (OUTPATIENT)
Dept: LAB | Facility: HOSPITAL | Age: 48
End: 2023-09-18
Attending: STUDENT IN AN ORGANIZED HEALTH CARE EDUCATION/TRAINING PROGRAM
Payer: COMMERCIAL

## 2023-09-18 DIAGNOSIS — Z00.00 ANNUAL PHYSICAL EXAM: ICD-10-CM

## 2023-09-18 DIAGNOSIS — R79.89 ELEVATED PROLACTIN LEVEL: ICD-10-CM

## 2023-09-18 DIAGNOSIS — R26.89 BALANCE PROBLEM: ICD-10-CM

## 2023-09-18 DIAGNOSIS — R42 DIZZINESS: ICD-10-CM

## 2023-09-18 DIAGNOSIS — R53.83 FATIGUE, UNSPECIFIED TYPE: ICD-10-CM

## 2023-09-18 DIAGNOSIS — D49.7 PITUITARY TUMOR: ICD-10-CM

## 2023-09-18 LAB
ALBUMIN SERPL BCP-MCNC: 3.9 G/DL (ref 3.5–5.2)
ALP SERPL-CCNC: 43 U/L (ref 55–135)
ALT SERPL W/O P-5'-P-CCNC: 18 U/L (ref 10–44)
ANION GAP SERPL CALC-SCNC: 6 MMOL/L (ref 8–16)
AST SERPL-CCNC: 19 U/L (ref 10–40)
BASOPHILS # BLD AUTO: 0.03 K/UL (ref 0–0.2)
BASOPHILS NFR BLD: 0.4 % (ref 0–1.9)
BILIRUB SERPL-MCNC: 0.4 MG/DL (ref 0.1–1)
BUN SERPL-MCNC: 10 MG/DL (ref 6–20)
CALCIUM SERPL-MCNC: 8.9 MG/DL (ref 8.7–10.5)
CHLORIDE SERPL-SCNC: 106 MMOL/L (ref 95–110)
CHOLEST SERPL-MCNC: 212 MG/DL (ref 120–199)
CHOLEST/HDLC SERPL: 3.7 {RATIO} (ref 2–5)
CO2 SERPL-SCNC: 26 MMOL/L (ref 23–29)
CREAT SERPL-MCNC: 1.1 MG/DL (ref 0.5–1.4)
DIFFERENTIAL METHOD: ABNORMAL
EOSINOPHIL # BLD AUTO: 0.2 K/UL (ref 0–0.5)
EOSINOPHIL NFR BLD: 2.2 % (ref 0–8)
ERYTHROCYTE [DISTWIDTH] IN BLOOD BY AUTOMATED COUNT: 14.6 % (ref 11.5–14.5)
EST. GFR  (NO RACE VARIABLE): >60 ML/MIN/1.73 M^2
ESTIMATED AVG GLUCOSE: 105 MG/DL (ref 68–131)
FERRITIN SERPL-MCNC: 31 NG/ML (ref 20–300)
FSH SERPL-ACNC: 6.29 MIU/ML
GLUCOSE SERPL-MCNC: 95 MG/DL (ref 70–110)
HBA1C MFR BLD: 5.3 % (ref 4–5.6)
HCT VFR BLD AUTO: 37.8 % (ref 37–48.5)
HDLC SERPL-MCNC: 57 MG/DL (ref 40–75)
HDLC SERPL: 26.9 % (ref 20–50)
HGB BLD-MCNC: 12.2 G/DL (ref 12–16)
IMM GRANULOCYTES # BLD AUTO: 0.04 K/UL (ref 0–0.04)
IMM GRANULOCYTES NFR BLD AUTO: 0.5 % (ref 0–0.5)
IRON SERPL-MCNC: 59 UG/DL (ref 30–160)
LDLC SERPL CALC-MCNC: 140.8 MG/DL (ref 63–159)
LYMPHOCYTES # BLD AUTO: 2.4 K/UL (ref 1–4.8)
LYMPHOCYTES NFR BLD: 32 % (ref 18–48)
MCH RBC QN AUTO: 27.9 PG (ref 27–31)
MCHC RBC AUTO-ENTMCNC: 32.3 G/DL (ref 32–36)
MCV RBC AUTO: 86 FL (ref 82–98)
MONOCYTES # BLD AUTO: 0.6 K/UL (ref 0.3–1)
MONOCYTES NFR BLD: 8 % (ref 4–15)
NEUTROPHILS # BLD AUTO: 4.2 K/UL (ref 1.8–7.7)
NEUTROPHILS NFR BLD: 56.9 % (ref 38–73)
NONHDLC SERPL-MCNC: 155 MG/DL
NRBC BLD-RTO: 0 /100 WBC
PLATELET # BLD AUTO: 207 K/UL (ref 150–450)
PMV BLD AUTO: 10.5 FL (ref 9.2–12.9)
POTASSIUM SERPL-SCNC: 3.7 MMOL/L (ref 3.5–5.1)
PROLACTIN SERPL IA-MCNC: 42.1 NG/ML (ref 5.2–26.5)
PROT SERPL-MCNC: 6.7 G/DL (ref 6–8.4)
RBC # BLD AUTO: 4.38 M/UL (ref 4–5.4)
SATURATED IRON: 15 % (ref 20–50)
SODIUM SERPL-SCNC: 138 MMOL/L (ref 136–145)
TOTAL IRON BINDING CAPACITY: 392 UG/DL (ref 250–450)
TRANSFERRIN SERPL-MCNC: 265 MG/DL (ref 200–375)
TRIGL SERPL-MCNC: 71 MG/DL (ref 30–150)
TSH SERPL DL<=0.005 MIU/L-ACNC: 2.62 UIU/ML (ref 0.4–4)
VIT B12 SERPL-MCNC: 557 PG/ML (ref 210–950)
WBC # BLD AUTO: 7.38 K/UL (ref 3.9–12.7)

## 2023-09-18 PROCEDURE — 36415 COLL VENOUS BLD VENIPUNCTURE: CPT | Mod: PN | Performed by: STUDENT IN AN ORGANIZED HEALTH CARE EDUCATION/TRAINING PROGRAM

## 2023-09-18 PROCEDURE — 80061 LIPID PANEL: CPT | Performed by: STUDENT IN AN ORGANIZED HEALTH CARE EDUCATION/TRAINING PROGRAM

## 2023-09-18 PROCEDURE — 83001 ASSAY OF GONADOTROPIN (FSH): CPT | Performed by: STUDENT IN AN ORGANIZED HEALTH CARE EDUCATION/TRAINING PROGRAM

## 2023-09-18 PROCEDURE — 84466 ASSAY OF TRANSFERRIN: CPT | Performed by: STUDENT IN AN ORGANIZED HEALTH CARE EDUCATION/TRAINING PROGRAM

## 2023-09-18 PROCEDURE — 83036 HEMOGLOBIN GLYCOSYLATED A1C: CPT | Performed by: STUDENT IN AN ORGANIZED HEALTH CARE EDUCATION/TRAINING PROGRAM

## 2023-09-18 PROCEDURE — 85025 COMPLETE CBC W/AUTO DIFF WBC: CPT | Performed by: STUDENT IN AN ORGANIZED HEALTH CARE EDUCATION/TRAINING PROGRAM

## 2023-09-18 PROCEDURE — 84443 ASSAY THYROID STIM HORMONE: CPT | Performed by: STUDENT IN AN ORGANIZED HEALTH CARE EDUCATION/TRAINING PROGRAM

## 2023-09-18 PROCEDURE — 82728 ASSAY OF FERRITIN: CPT | Performed by: STUDENT IN AN ORGANIZED HEALTH CARE EDUCATION/TRAINING PROGRAM

## 2023-09-18 PROCEDURE — 84305 ASSAY OF SOMATOMEDIN: CPT | Performed by: STUDENT IN AN ORGANIZED HEALTH CARE EDUCATION/TRAINING PROGRAM

## 2023-09-18 PROCEDURE — 84146 ASSAY OF PROLACTIN: CPT | Performed by: STUDENT IN AN ORGANIZED HEALTH CARE EDUCATION/TRAINING PROGRAM

## 2023-09-18 PROCEDURE — 82607 VITAMIN B-12: CPT | Performed by: STUDENT IN AN ORGANIZED HEALTH CARE EDUCATION/TRAINING PROGRAM

## 2023-09-18 PROCEDURE — 83540 ASSAY OF IRON: CPT | Performed by: STUDENT IN AN ORGANIZED HEALTH CARE EDUCATION/TRAINING PROGRAM

## 2023-09-18 PROCEDURE — 80053 COMPREHEN METABOLIC PANEL: CPT | Performed by: STUDENT IN AN ORGANIZED HEALTH CARE EDUCATION/TRAINING PROGRAM

## 2023-09-25 LAB
IGF-I SERPL-MCNC: 129 NG/ML (ref 44–227)
IGF-I Z-SCORE SERPL: 0.39 SD

## 2023-10-03 ENCOUNTER — PATIENT MESSAGE (OUTPATIENT)
Dept: PRIMARY CARE CLINIC | Facility: CLINIC | Age: 48
End: 2023-10-03
Payer: COMMERCIAL

## 2023-10-09 ENCOUNTER — TELEPHONE (OUTPATIENT)
Dept: ENDOSCOPY | Facility: HOSPITAL | Age: 48
End: 2023-10-09
Payer: COMMERCIAL

## 2023-10-14 ENCOUNTER — HOSPITAL ENCOUNTER (OUTPATIENT)
Facility: HOSPITAL | Age: 48
Discharge: HOME | End: 2023-10-14
Attending: INTERNAL MEDICINE | Admitting: INTERNAL MEDICINE
Payer: COMMERCIAL

## 2023-10-14 ENCOUNTER — ANESTHESIA EVENT (OUTPATIENT)
Dept: ENDOSCOPY | Facility: HOSPITAL | Age: 48
End: 2023-10-14
Payer: COMMERCIAL

## 2023-10-14 ENCOUNTER — ANESTHESIA (OUTPATIENT)
Dept: ENDOSCOPY | Facility: HOSPITAL | Age: 48
End: 2023-10-14
Payer: COMMERCIAL

## 2023-10-14 VITALS
SYSTOLIC BLOOD PRESSURE: 110 MMHG | WEIGHT: 174 LBS | DIASTOLIC BLOOD PRESSURE: 65 MMHG | BODY MASS INDEX: 30.83 KG/M2 | HEIGHT: 63 IN | TEMPERATURE: 98 F | RESPIRATION RATE: 16 BRPM | HEART RATE: 69 BPM | OXYGEN SATURATION: 99 %

## 2023-10-14 DIAGNOSIS — Z12.11 SCREENING FOR COLON CANCER: ICD-10-CM

## 2023-10-14 LAB
B-HCG UR QL: NEGATIVE
CTP QC/QA: YES

## 2023-10-14 PROCEDURE — 88305 TISSUE EXAM BY PATHOLOGIST: ICD-10-PCS | Mod: 26,,, | Performed by: PATHOLOGY

## 2023-10-14 PROCEDURE — 81025 URINE PREGNANCY TEST: CPT | Performed by: INTERNAL MEDICINE

## 2023-10-14 PROCEDURE — 27201089 HC SNARE, DISP (ANY): Performed by: INTERNAL MEDICINE

## 2023-10-14 PROCEDURE — 45380 COLONOSCOPY AND BIOPSY: CPT | Mod: 33,59,, | Performed by: INTERNAL MEDICINE

## 2023-10-14 PROCEDURE — 25000003 PHARM REV CODE 250: Performed by: INTERNAL MEDICINE

## 2023-10-14 PROCEDURE — 45385 PR COLONOSCOPY,REMV LESN,SNARE: ICD-10-PCS | Mod: 33,,, | Performed by: INTERNAL MEDICINE

## 2023-10-14 PROCEDURE — E9220 PRA ENDO ANESTHESIA: HCPCS | Mod: 33,,, | Performed by: NURSE ANESTHETIST, CERTIFIED REGISTERED

## 2023-10-14 PROCEDURE — E9220 PRA ENDO ANESTHESIA: ICD-10-PCS | Mod: 33,,, | Performed by: NURSE ANESTHETIST, CERTIFIED REGISTERED

## 2023-10-14 PROCEDURE — 45385 COLONOSCOPY W/LESION REMOVAL: CPT | Mod: PT | Performed by: INTERNAL MEDICINE

## 2023-10-14 PROCEDURE — 25000003 PHARM REV CODE 250: Performed by: NURSE ANESTHETIST, CERTIFIED REGISTERED

## 2023-10-14 PROCEDURE — 45385 COLONOSCOPY W/LESION REMOVAL: CPT | Mod: 33,,, | Performed by: INTERNAL MEDICINE

## 2023-10-14 PROCEDURE — 37000008 HC ANESTHESIA 1ST 15 MINUTES: Performed by: INTERNAL MEDICINE

## 2023-10-14 PROCEDURE — 45380 PR COLONOSCOPY,BIOPSY: ICD-10-PCS | Mod: 33,59,, | Performed by: INTERNAL MEDICINE

## 2023-10-14 PROCEDURE — 27201012 HC FORCEPS, HOT/COLD, DISP: Performed by: INTERNAL MEDICINE

## 2023-10-14 PROCEDURE — 37000009 HC ANESTHESIA EA ADD 15 MINS: Performed by: INTERNAL MEDICINE

## 2023-10-14 PROCEDURE — 88305 TISSUE EXAM BY PATHOLOGIST: CPT | Mod: 26,,, | Performed by: PATHOLOGY

## 2023-10-14 PROCEDURE — 45380 COLONOSCOPY AND BIOPSY: CPT | Mod: PT,59 | Performed by: INTERNAL MEDICINE

## 2023-10-14 PROCEDURE — 63600175 PHARM REV CODE 636 W HCPCS: Performed by: NURSE ANESTHETIST, CERTIFIED REGISTERED

## 2023-10-14 PROCEDURE — 88305 TISSUE EXAM BY PATHOLOGIST: CPT | Mod: 59 | Performed by: PATHOLOGY

## 2023-10-14 RX ORDER — PROPOFOL 10 MG/ML
VIAL (ML) INTRAVENOUS
Status: DISCONTINUED | OUTPATIENT
Start: 2023-10-14 | End: 2023-10-14

## 2023-10-14 RX ORDER — LIDOCAINE HYDROCHLORIDE 20 MG/ML
INJECTION INTRAVENOUS
Status: DISCONTINUED | OUTPATIENT
Start: 2023-10-14 | End: 2023-10-14

## 2023-10-14 RX ORDER — SODIUM CHLORIDE 9 MG/ML
INJECTION, SOLUTION INTRAVENOUS CONTINUOUS
Status: DISCONTINUED | OUTPATIENT
Start: 2023-10-14 | End: 2023-10-14 | Stop reason: HOSPADM

## 2023-10-14 RX ADMIN — LIDOCAINE HYDROCHLORIDE 100 MG: 20 INJECTION INTRAVENOUS at 08:10

## 2023-10-14 RX ADMIN — PROPOFOL 100 MG: 10 INJECTION, EMULSION INTRAVENOUS at 08:10

## 2023-10-14 RX ADMIN — SODIUM CHLORIDE: 0.9 INJECTION, SOLUTION INTRAVENOUS at 08:10

## 2023-10-14 NOTE — PROVATION PATIENT INSTRUCTIONS
Discharge Summary/Instructions after an Endoscopic Procedure  Patient Name: Desiree Salas  Patient MRN: [id number]  Patient YOB: 1975 Saturday, October 14, 2023  Storm Poole MD  Dear patient,  As a result of recent federal legislation (The Federal Cures Act), you may   receive lab or pathology results from your procedure in your [facility]   account before your physician is able to contact you. Your physician or   their representative will relay the results to you with their   recommendations at their soonest availability.  Thank you,  RESTRICTIONS:  During your procedure today, you received medications for sedation.  These   medications may affect your judgment, balance and coordination.  Therefore,   for 24 hours, you have the following restrictions:   - DO NOT drive a car, operate machinery, make legal/financial decisions,   sign important papers or drink alcohol.    ACTIVITY:  Today: no heavy lifting, straining or running due to procedural   sedation/anesthesia.  The following day: return to full activity including work.  DIET:  Eat and drink normally unless instructed otherwise.     TREATMENT FOR COMMON SIDE EFFECTS:  - Mild abdominal pain, nausea, belching, bloating or excessive gas:  rest,   eat lightly and use a heating pad.  - Sore Throat: treat with throat lozenges and/or gargle with warm salt   water.  - Because air was used during the procedure, expelling large amounts of air   from your rectum or belching is normal.  - If a bowel prep was taken, you may not have a bowel movement for 1-3 days.    This is normal.  SYMPTOMS TO WATCH FOR AND REPORT TO YOUR PHYSICIAN:  1. Abdominal pain or bloating, other than gas cramps.  2. Chest pain.  3. Back pain.  4. Signs of infection such as: chills or fever occurring within 24 hours   after the procedure.  5. Rectal bleeding, which would show as bright red, maroon, or black stools.   (A tablespoon of blood from the rectum is not serious,  especially if   hemorrhoids are present.)  6. Vomiting.  7. Weakness or dizziness.  GO DIRECTLY TO THE NEAREST EMERGENCY ROOM IF YOU HAVE ANY OF THE FOLLOWING:      Difficulty breathing              Chills and/or fever over 101 F   Persistent vomiting and/or vomiting blood   Severe abdominal pain   Severe chest pain   Black, tarry stools   Bleeding- more than one tablespoon   Any other symptom or condition that you feel may need urgent attention  Your doctor recommends these additional instructions:  If any biopsies were taken, your doctors clinic will contact you in 1 to 2   weeks with any results.  - Discharge patient to home.   - Resume previous diet today.   - Continue present medications.   - Await pathology results.   - Repeat colonoscopy in 5 years for surveillance.   - Return to referring physician as previously scheduled.   - Patient has a contact number available for emergencies.  The signs and   symptoms of potential delayed complications were discussed with the   patient.  Return to normal activities tomorrow.  Written discharge   instructions were provided to the patient.  For questions, problems or results please call your physician - Storm Poole MD at Work:  [phone].  [facility], EMERGENCY ROOM PHONE NUMBER: [phone]  IF A COMPLICATION OR EMERGENCY SITUATION ARISES AND YOU ARE UNABLE TO REACH   YOUR PHYSICIAN - GO DIRECTLY TO THE EMERGENCY ROOM.  Storm Poole MD  10/14/2023 8:59:04 AM  This report has been verified and signed electronically.  Dear patient,  As a result of recent federal legislation (The Federal Cures Act), you may   receive lab or pathology results from your procedure in your [facility]   account before your physician is able to contact you. Your physician or   their representative will relay the results to you with their   recommendations at their soonest availability.  Thank you,  PROVATION

## 2023-10-14 NOTE — ANESTHESIA POSTPROCEDURE EVALUATION
Anesthesia Post Evaluation    Patient: Desiree Salas    Procedure(s) Performed: Procedure(s) (LRB):  COLONOSCOPY (N/A)    Final Anesthesia Type: general      Patient location during evaluation: PACU  Patient participation: Yes- Able to Participate  Level of consciousness: awake and alert  Post-procedure vital signs: reviewed and stable  Pain management: adequate  Airway patency: patent    PONV status at discharge: No PONV  Anesthetic complications: no      Cardiovascular status: blood pressure returned to baseline  Respiratory status: unassisted  Follow-up not needed.          Vitals Value Taken Time   /64 10/14/23 0902   Temp 36.7 °C (98 °F) 10/14/23 0902   Pulse 85 10/14/23 0902   Resp 16 10/14/23 0902   SpO2 99 % 10/14/23 0902         No case tracking events are documented in the log.      Pain/Eric Score: Eric Score: 10 (10/14/2023  9:02 AM)

## 2023-10-14 NOTE — ANESTHESIA PREPROCEDURE EVALUATION
10/14/2023  Pre-operative evaluation for Procedure(s) (LRB):  COLONOSCOPY (N/A)    Desiree Salas is a 48 y.o. female     Patient Active Problem List   Diagnosis    Anxiety and depression    Attention deficit hyperactivity disorder (ADHD), predominantly inattentive type    Ganglion cyst of wrist, left    S/P myomectomy    Right leg paresthesias    Chronic midline low back pain without sciatica    Chronic hip pain, right    Nasal septal deviation    Sensorineural hearing loss (SNHL) of both ears    Chronic pain of both knees    Hypercholesteremia    Snoring    Pituitary tumor    Elevated prolactin level    Brain vascular malformation       Review of patient's allergies indicates:   Allergen Reactions    Grass pollen Other (See Comments)    House dust Itching       No current facility-administered medications on file prior to encounter.     No current outpatient medications on file prior to encounter.       Past Surgical History:   Procedure Laterality Date    CYST REMOVAL      groin area    DILATION AND CURETTAGE OF UTERUS      due to heavy/irregular menses    EXCISION OF GANGLION OF WRIST Left 2018    Procedure: EXCISION, GANGLION CYST, WRIST LEFT;  Surgeon: Walter Joyner Jr., MD;  Location: [facility] OR;  Service: Plastics;  Laterality: Left;    MYOMECTOMY N/A 3/15/2019    Procedure: MYOMECTOMY OPEN;  Surgeon: Heena Castaneda MD;  Location: [facility];  Service: OB/GYN;  Laterality: N/A;  Dr. Becerar to asst       Social History     Socioeconomic History    Marital status: Single   Occupational History     Comment: Works at [employer] -    Tobacco Use    Smoking status: Former     Current packs/day: 0.00     Types: Cigarettes     Start date: 2015     Quit date: 2017     Years since quittin.2    Smokeless tobacco: Never    Tobacco comments:     1 pack  in a week   Substance and Sexual Activity    Alcohol use: Not Currently    Drug use: Yes     Types: Marijuana     Comment: daily    Sexual activity: Yes     Partners: Male     Birth control/protection: Condom     Social Determinants of Health     Physical Activity: Unknown (5/5/2021)    Exercise Vital Sign     Days of Exercise per Week: 0 days           Pre-op Assessment    I have reviewed the Patient Summary Reports.     I have reviewed the Nursing Notes. I have reviewed the NPO Status.      Review of Systems  Anesthesia Hx:  No problems with previous Anesthesia    Cardiovascular:  Cardiovascular Normal     Pulmonary:   Asthma    Hepatic/GI:  Hepatic/GI Normal    Endocrine:  Endocrine Normal    Psych:   Psychiatric History          Physical Exam    Airway:  Mouth Opening: Normal  Tongue: Normal    Chest/Lungs:  Normal Respiratory Rate    Heart:  Rhythm: Regular Rhythm        Anesthesia Plan  Type of Anesthesia, risks & benefits discussed:    Anesthesia Type: Gen Natural Airway  Intra-op Monitoring Plan: Standard ASA Monitors  Induction:  IV  Informed Consent: Informed consent signed with the Patient and all parties understand the risks and agree with anesthesia plan.  All questions answered.   ASA Score: 2    Ready For Surgery From Anesthesia Perspective.     .

## 2023-10-14 NOTE — H&P
Short Stay Endoscopy History and Physical    PCP - Mallika Galvan MD    Procedure - Colonoscopy  ASA - 2  Mallampati - per anesthesia  History of Anesthesia problems - no  Family history Anesthesia problems -  no     HPI:  This is a 48 y.o. female here for evaluation of :     Average Risk Screening: yes  High risk screening: No  History of polyps: No  Anemia: No  Blood in stools: No  Diarrhea: No  Abdominal Pain: No    Review of Systems:  CONSTITUTIONAL: Denies weight change,  fatigue, fevers, chills, night sweats.  CARDIOVASCULAR: Denies chest pain, shortness of breath, orthopnea and edema.  RESPIRATORY: Denies cough, hemoptysis, dyspnea, and wheezing.  GI: See HPI.    Medical History:  Past Medical History:   Diagnosis Date    ADHD (attention deficit hyperactivity disorder), inattentive type     Anxiety     Asthma     exercise induced; no inhaler use    Depression     Pituitary tumor        Surgical History:   Past Surgical History:   Procedure Laterality Date    CYST REMOVAL      groin area    DILATION AND CURETTAGE OF UTERUS      due to heavy/irregular menses    EXCISION OF GANGLION OF WRIST Left 2018    Procedure: EXCISION, GANGLION CYST, WRIST LEFT;  Surgeon: Walter Joyner Jr., MD;  Location: [facility];  Service: Plastics;  Laterality: Left;    MYOMECTOMY N/A 3/15/2019    Procedure: MYOMECTOMY OPEN;  Surgeon: Heena Castaneda MD;  Location: [facility];  Service: OB/GYN;  Laterality: N/A;  Dr. Becerra to asst       Family History:   Family History   Problem Relation Age of Onset    Alzheimer's disease Maternal Grandmother     Diabetes Father     Hypertension Father     Diabetes Mother     Cancer Brother         unsure of the type    Migraines Sister     Breast cancer Neg Hx        Social History:   Social History     Tobacco Use    Smoking status: Former     Current packs/day: 0.00     Types: Cigarettes     Start date: 2015     Quit date: 2017     Years since quittin.2    Smokeless  tobacco: Never    Tobacco comments:     1 pack in a week   Substance Use Topics    Alcohol use: Not Currently    Drug use: Yes     Types: Marijuana     Comment: daily       Allergies: Reviewed.    Medications:  No current facility-administered medications on file prior to encounter.     No current outpatient medications on file prior to encounter.       Physical Exam:  Vital Signs:   Vitals:    10/14/23 0737   BP: (!) 141/88   Pulse: 84   Resp: 18   Temp: 98.4 °F (36.9 °C)     General Appearance: Well appearing in no acute distress  ENT: OP clear  Chest: CTA B  CV: RRR, no m/r/g  Abd: s/nt/nd/nabs  Ext: no edema    Labs:  Reviewed    IMPRESSION:    Screening    Plan:  I have explained the risks and benefits of colonoscopy to the patient including but not limited to bleeding, perforation, infection, and death. The patient wishes to proceed with colonoscopy.

## 2023-10-14 NOTE — TRANSFER OF CARE
"Anesthesia Transfer of Care Note    Patient: Desiree Salas    Procedure(s) Performed: Procedure(s) (LRB):  COLONOSCOPY (N/A)    Patient location: GI    Anesthesia Type: general    Transport from OR: Transported from OR on room air with adequate spontaneous ventilation    Post pain: adequate analgesia    Post assessment: no apparent anesthetic complications and tolerated procedure well    Post vital signs: stable    Level of consciousness: awake, alert and oriented    Nausea/Vomiting: no nausea/vomiting    Complications: none    Transfer of care protocol was followed      Last vitals:   Visit Vitals  /64   Pulse 85   Temp 36.7 °C (98 °F)   Resp 16   Ht 5' 3" (1.6 m)   Wt 78.9 kg (174 lb)   SpO2 99%   Breastfeeding No   BMI 30.82 kg/m²     "

## 2023-10-17 ENCOUNTER — OFFICE VISIT (OUTPATIENT)
Dept: OTOLARYNGOLOGY | Facility: CLINIC | Age: 48
End: 2023-10-17
Payer: COMMERCIAL

## 2023-10-17 ENCOUNTER — PATIENT MESSAGE (OUTPATIENT)
Dept: OTOLARYNGOLOGY | Facility: CLINIC | Age: 48
End: 2023-10-17

## 2023-10-17 ENCOUNTER — CLINICAL SUPPORT (OUTPATIENT)
Dept: AUDIOLOGY | Facility: CLINIC | Age: 48
End: 2023-10-17
Payer: COMMERCIAL

## 2023-10-17 DIAGNOSIS — R42 DIZZINESS: ICD-10-CM

## 2023-10-17 DIAGNOSIS — R26.89 IMBALANCE: ICD-10-CM

## 2023-10-17 DIAGNOSIS — H90.3 SENSORINEURAL HEARING LOSS, BILATERAL: Primary | ICD-10-CM

## 2023-10-17 DIAGNOSIS — H90.3 SENSORINEURAL HEARING LOSS (SNHL) OF BOTH EARS: Primary | ICD-10-CM

## 2023-10-17 PROCEDURE — 3044F PR MOST RECENT HEMOGLOBIN A1C LEVEL <7.0%: ICD-10-PCS | Mod: CPTII,S$GLB,,

## 2023-10-17 PROCEDURE — 99214 OFFICE O/P EST MOD 30 MIN: CPT | Mod: S$GLB,,,

## 2023-10-17 PROCEDURE — 92567 PR TYMPA2METRY: ICD-10-PCS | Mod: S$GLB,,, | Performed by: AUDIOLOGIST

## 2023-10-17 PROCEDURE — 99999 PR PBB SHADOW E&M-EST. PATIENT-LVL III: ICD-10-PCS | Mod: PBBFAC,,,

## 2023-10-17 PROCEDURE — 1159F MED LIST DOCD IN RCRD: CPT | Mod: CPTII,S$GLB,,

## 2023-10-17 PROCEDURE — 99214 PR OFFICE/OUTPT VISIT, EST, LEVL IV, 30-39 MIN: ICD-10-PCS | Mod: S$GLB,,,

## 2023-10-17 PROCEDURE — 99999 PR PBB SHADOW E&M-EST. PATIENT-LVL III: CPT | Mod: PBBFAC,,,

## 2023-10-17 PROCEDURE — 3044F HG A1C LEVEL LT 7.0%: CPT | Mod: CPTII,S$GLB,,

## 2023-10-17 PROCEDURE — 99999 PR PBB SHADOW E&M-EST. PATIENT-LVL I: ICD-10-PCS | Mod: PBBFAC,,, | Performed by: AUDIOLOGIST

## 2023-10-17 PROCEDURE — 99999 PR PBB SHADOW E&M-EST. PATIENT-LVL I: CPT | Mod: PBBFAC,,, | Performed by: AUDIOLOGIST

## 2023-10-17 PROCEDURE — 1159F PR MEDICATION LIST DOCUMENTED IN MEDICAL RECORD: ICD-10-PCS | Mod: CPTII,S$GLB,,

## 2023-10-17 PROCEDURE — 92567 TYMPANOMETRY: CPT | Mod: S$GLB,,, | Performed by: AUDIOLOGIST

## 2023-10-17 PROCEDURE — 92557 PR COMPREHENSIVE HEARING TEST: ICD-10-PCS | Mod: S$GLB,,, | Performed by: AUDIOLOGIST

## 2023-10-17 PROCEDURE — 92557 COMPREHENSIVE HEARING TEST: CPT | Mod: S$GLB,,, | Performed by: AUDIOLOGIST

## 2023-10-17 NOTE — PROGRESS NOTES
"Subjective:   Desiree Salas is a 48 y.o. female who presents for evaluation of dizziness.   The symptoms started several months ago and have gradually worsened.   Describes dizziness as  "off balance" and sensation of going backwards when climbing up the stairs. She reports having previous slight off balance issues going  a few flight of upstairs but until recently going up many flight of stairs (due to apartment elevator breaking) she experiences worsening.  The attacks occur daily .  Positions that worsen symptoms: going up stairs motion.  Associated ear symptoms (with episodes): none. Reports she has hearing loss bilaterally for many years, and has to increase volume on TV and use closed caption.  Associated symptoms include: reports she has bilateral visual floaters. Seen opthalmology on 6/14/2022  Neuro-ophthalmologic examination was notable for good corrected visual acuities with monovision CL, full color vision, normal ocular motility and alignment. Funduscopic exam normal. OCT and HVF normal. No concern for any sellar extension of pituitary impacting vision  Recent infections: none.   Head trauma: Reports having a head injury with a front door and was concerned for concussion. .   Previous ear surgery: No.   Noise exposure: no occupational exposure and no firearm exposure.   Previous workup: none  Previous treatment includes: no medications to date.  Headache HX : frontal headache, b/w eyes  Family hx of migraine: none  Cardiac hx: none   Diabetic hx: none  New medication or changes: none  There is not a family history of hearing loss at a young age.   Past Medical History  She has a past medical history of ADHD (attention deficit hyperactivity disorder), inattentive type, Anxiety, Asthma, Depression, and Pituitary tumor.    Past Surgical History  She has a past surgical history that includes Cyst Removal (2016); Dilation and curettage of uterus (1998); Excision of ganglion of wrist (Left, 12/18/2018); " Myomectomy (N/A, 3/15/2019); and Colonoscopy (N/A, 10/14/2023).    Family History  Her family history includes Alzheimer's disease in her maternal grandmother; Cancer in her brother; Diabetes in her father and mother; Hypertension in her father; Migraines in her sister.    Social History  She reports that she quit smoking about 6 years ago. Her smoking use included cigarettes. She started smoking about 8 years ago. She has never used smokeless tobacco. She reports that she does not currently use alcohol. She reports current drug use. Drug: Marijuana.    Allergies  She is allergic to grass pollen and house dust.    Medications  She has a current medication list which includes the following prescription(s): bupropion, tylenol pm extra strength, duloxetine, progesterone, and vyvanse.  Review of Systems   HENT: Positive for hearing loss.  Negative for ear discharge, ear infection, ear pain, postnasal drip, ringing in the ears, runny nose, sinus infection, sinus pressure, sore throat and stuffy nose.      Respiratory:  Negative for shortness of breath.      Musc: Positive for back pain.     Neurological: Positive for dizziness. Negative for headaches.          Objective:     Constitutional:   She is oriented to person, place, and time. She appears well-developed and well-nourished. She appears alert. She is cooperative.  Non-toxic appearance. She does not have a sickly appearance. She does not appear ill. Normal speech.      Head:  Normocephalic and atraumatic. Head is without right periorbital erythema, without left periorbital erythema and without TMJ tenderness. Salivary glands normal.  Facial strength is normal.      Ears:    Right Ear: No lacerations. No drainage, swelling or tenderness. No foreign bodies. No mastoid tenderness. Tympanic membrane is not injected, not scarred, not perforated, not erythematous, not retracted and not bulging. Tympanic membrane mobility is normal. No middle ear effusion. No PE tube. No  hemotympanum.   Left Ear: No lacerations. No drainage, swelling or tenderness. No foreign bodies. No mastoid tenderness. Tympanic membrane is not injected, not scarred, not perforated, not erythematous, not retracted and not bulging. Tympanic membrane mobility is normal.  No middle ear effusion.  No PE tube. No hemotympanum.     Nose:  No mucosal edema, rhinorrhea or sinus tenderness. No epistaxis. Turbinates normal.  Right sinus exhibits no maxillary sinus tenderness and no frontal sinus tenderness. Left sinus exhibits no maxillary sinus tenderness and no frontal sinus tenderness.     Mouth/Throat  Oropharynx clear and moist without lesions or asymmetry and normal uvula midline. Normal dentition. No uvula swelling, oral lesions, trismus or mucous membrane lesions. No oropharyngeal exudate, posterior oropharyngeal edema or posterior oropharyngeal erythema.     Neck:  Trachea normal, phonation normal and no adenopathy. Thyroid tenderness is present. No edema, no erythema and no neck rigidity present. No thyroid mass and no thyromegaly present.     She has no cervical adenopathy.     Pulmonary/Chest:   Effort normal.     Psychiatric:   She has a normal mood and affect. Her speech is normal and behavior is normal.     Neurological:   She is alert and oriented to person, place, and time. She has neurological normal, alert and oriented.   Cris-Hallpike Left: Negative for torsional and up-beating nystagmus    Moccasin-Hallpike Right: Negative for torsional and up-beating nystagmus    Tandem Gait: normal slight instability     Heel to Shin: normal    Romberg: Negative    Head thrust: normal     Fukada test: to right veer    EOM: normal       Imaging  MRA 06/14/2022  Impression:     Developmental venous anomaly centered in the left basal ganglia.  No evidence of underlying vascular nidus arterialized flow to suggest an arteriovenous malformation or mixed vascular malformation.  Audiogram      I independently reviewed the tracings  of the complete audiometric evaluation performed today. I reviewed the audiogram with the patient as well. Pertinent findings include : mpanometry revealed Type A tympanogram in the right ear and Type A tympanogram in the left ear. Audiogram results revealed mild to moderately severe sensorineural hearing loss in the right ear and mild to moderate sensorineural hearing loss in the left ear.  Assessment:     1. Sensorineural hearing loss (SNHL) of both ears    2. Dizziness    3. Imbalance      Plan:   Desiree was seen today for dizziness.    Diagnoses and all orders for this visit:    Sensorineural hearing loss (SNHL) of both ears  Audiometric testing interpretation consistent with sensorineural hearing loss.  Discussed the etiology of SNHL. Medically cleared for hearing amplification, and will follow-up with Audiology if interested. Hearing conservation in noisy environments. Return to clinic every year for audiometric testing.   Dizziness  Negative [place] and HPI negative for BPPV.  -     Ambulatory referral/consult to Physical/Occupational Therapy; Future  -     Electronystagmographyx; Future  I recommend vestibular rehabilitation therapy which involves exercises to help manage dizziness and balance issues (imbalance).   I ordered a vestibulo nystagmogram if symptoms persist after 2-3 months of vestibular therapy  to further assess the nature of her vertigo.     Imbalance  -     Ambulatory referral/consult to Physical/Occupational Therapy; Future  -     Electronystagmographyx; Future  Imbalance likely related to chronic lower pain, and chronic pain of bilateral knees.   Neurology referral to evaluate balance issue and possible back instability when going up stairs.     Has seen Dr. Jones to r/o pituitary tumor secondary to elevated prolactin levels however MRI brain was negative.     RTC as needed or if symptoms persist.  Questions answered.

## 2023-10-17 NOTE — PROGRESS NOTES
Audiologic Evaluation 10/17/2023:       Desiree Salas, a 48 y.o. female, was seen today in the clinic for an audiologic evaluation.  Ms. Salas reported episodes of dizziness and imbalance since approximately July. She reported a history of bilateral hearing loss and occasional tinnitus. Ms. Salas denied otalgia.    Tympanometry revealed Type A tympanogram in the right ear and Type A tympanogram in the left ear. Audiogram results revealed mild to moderately severe sensorineural hearing loss in the right ear and mild to moderate sensorineural hearing loss in the left ear.  Speech reception thresholds were noted at 40 dB in the right ear and 40 dB in the left ear.  Speech discrimination scores were 100% in the right ear and 96% in the left ear.    Recommendations:  Otologic evaluation  Hearing aid consultation  Annual audiogram  Hearing protection when in noise

## 2023-10-18 LAB
FINAL PATHOLOGIC DIAGNOSIS: NORMAL
GROSS: NORMAL
Lab: NORMAL

## 2023-10-23 DIAGNOSIS — F90.0 ATTENTION DEFICIT HYPERACTIVITY DISORDER (ADHD), PREDOMINANTLY INATTENTIVE TYPE: ICD-10-CM

## 2023-10-23 NOTE — TELEPHONE ENCOUNTER
No care due was identified.  [facility] Embedded Care Due Messages. Reference number: [id number].   10/23/2023 1:34:44 PM CDT

## 2023-10-24 ENCOUNTER — LAB VISIT (OUTPATIENT)
Dept: LAB | Facility: HOSPITAL | Age: 48
End: 2023-10-24
Payer: COMMERCIAL

## 2023-10-24 ENCOUNTER — OFFICE VISIT (OUTPATIENT)
Dept: ENDOCRINOLOGY | Facility: CLINIC | Age: 48
End: 2023-10-24
Payer: COMMERCIAL

## 2023-10-24 VITALS
SYSTOLIC BLOOD PRESSURE: 112 MMHG | HEART RATE: 81 BPM | DIASTOLIC BLOOD PRESSURE: 65 MMHG | HEIGHT: 63 IN | BODY MASS INDEX: 29.57 KG/M2 | WEIGHT: 166.88 LBS

## 2023-10-24 DIAGNOSIS — R79.89 ELEVATED PROLACTIN LEVEL: ICD-10-CM

## 2023-10-24 DIAGNOSIS — D49.7 PITUITARY TUMOR: ICD-10-CM

## 2023-10-24 PROCEDURE — 3078F DIAST BP <80 MM HG: CPT | Mod: CPTII,S$GLB,, | Performed by: INTERNAL MEDICINE

## 2023-10-24 PROCEDURE — 3008F PR BODY MASS INDEX (BMI) DOCUMENTED: ICD-10-PCS | Mod: CPTII,S$GLB,, | Performed by: INTERNAL MEDICINE

## 2023-10-24 PROCEDURE — 3044F HG A1C LEVEL LT 7.0%: CPT | Mod: CPTII,S$GLB,, | Performed by: INTERNAL MEDICINE

## 2023-10-24 PROCEDURE — 99214 PR OFFICE/OUTPT VISIT, EST, LEVL IV, 30-39 MIN: ICD-10-PCS | Mod: S$GLB,,, | Performed by: INTERNAL MEDICINE

## 2023-10-24 PROCEDURE — 3078F PR MOST RECENT DIASTOLIC BLOOD PRESSURE < 80 MM HG: ICD-10-PCS | Mod: CPTII,S$GLB,, | Performed by: INTERNAL MEDICINE

## 2023-10-24 PROCEDURE — 99214 OFFICE O/P EST MOD 30 MIN: CPT | Mod: S$GLB,,, | Performed by: INTERNAL MEDICINE

## 2023-10-24 PROCEDURE — 99999 PR PBB SHADOW E&M-EST. PATIENT-LVL IV: CPT | Mod: PBBFAC,,, | Performed by: INTERNAL MEDICINE

## 2023-10-24 PROCEDURE — 3074F SYST BP LT 130 MM HG: CPT | Mod: CPTII,S$GLB,, | Performed by: INTERNAL MEDICINE

## 2023-10-24 PROCEDURE — 84146 ASSAY OF PROLACTIN: CPT | Mod: 91 | Performed by: STUDENT IN AN ORGANIZED HEALTH CARE EDUCATION/TRAINING PROGRAM

## 2023-10-24 PROCEDURE — 36415 COLL VENOUS BLD VENIPUNCTURE: CPT | Performed by: STUDENT IN AN ORGANIZED HEALTH CARE EDUCATION/TRAINING PROGRAM

## 2023-10-24 PROCEDURE — 1159F MED LIST DOCD IN RCRD: CPT | Mod: CPTII,S$GLB,, | Performed by: INTERNAL MEDICINE

## 2023-10-24 PROCEDURE — 1159F PR MEDICATION LIST DOCUMENTED IN MEDICAL RECORD: ICD-10-PCS | Mod: CPTII,S$GLB,, | Performed by: INTERNAL MEDICINE

## 2023-10-24 PROCEDURE — 3008F BODY MASS INDEX DOCD: CPT | Mod: CPTII,S$GLB,, | Performed by: INTERNAL MEDICINE

## 2023-10-24 PROCEDURE — 3074F PR MOST RECENT SYSTOLIC BLOOD PRESSURE < 130 MM HG: ICD-10-PCS | Mod: CPTII,S$GLB,, | Performed by: INTERNAL MEDICINE

## 2023-10-24 PROCEDURE — 99999 PR PBB SHADOW E&M-EST. PATIENT-LVL IV: ICD-10-PCS | Mod: PBBFAC,,, | Performed by: INTERNAL MEDICINE

## 2023-10-24 PROCEDURE — 3044F PR MOST RECENT HEMOGLOBIN A1C LEVEL <7.0%: ICD-10-PCS | Mod: CPTII,S$GLB,, | Performed by: INTERNAL MEDICINE

## 2023-10-24 RX ORDER — LISDEXAMFETAMINE DIMESYLATE 50 MG/1
50 CAPSULE ORAL EVERY MORNING
Qty: 30 CAPSULE | Refills: 0 | Status: SHIPPED | OUTPATIENT
Start: 2023-10-24

## 2023-10-24 NOTE — ASSESSMENT & PLAN NOTE
Small hypointensity on prior outside MRI imaging which may represent a microprolactinoma.  She is getting regular menstrual cycles but seeking pregnancy.  Prolactin with a recent increase.        Will check macroprolactin today before treatment decision is made.      If this is normal then no therapy should be needed with regards to prolactin.  If this is also elevated then we would consider restarting Cabergoline at 0.25 mg twice weekly given her desire to become pregnant.       Discussed that if she is no longer trying for pregnancy she does not need to be on Cabergoline therapy if her cycles continue normally and her prolactin is still elevated.     If labs and pregnancy desires point towards the need for cabergoline she was instructed to stop cabergoline if pregnant and let us know.

## 2023-10-24 NOTE — PROGRESS NOTES
[facility] ENDOCRINOLOGY FOLLOW UP VISIT  10/24/2023     Subjective:      Reason for referal: referred by Mallika Galvan MD for evaluation and management of elevated prolactin.    HPI:   Desiree Salas is a 48 y.o. female with hx of ADHD, anxiety/depression, infertility who presents for follow up evaluation of elevated prolactin.      She was seen by Dr. Jones on 6/14/22 at which time prior medical records were not available but she was noted to have elevated prolactin of 99 and 60 on infertility evaluation with pituitary MRI showing 2.5 mm hypoenhancement.  She had normal TSH and IGF-1    She was first seen by endocrine service in pituitary clinic in June 2022 with plans to start Cabergoline for a pituitary microadenoma in the setting of prolactin elevation but prolactin was normal so we held off.      Initial presentation:   Noted to have elevated prolactin on fertility workup.  Had been seeing fertility specialist and had had several rounds of injection treatments with what patient describes as IUI but has not been able to become pregnant.        Imaging:      Outside MRI brain, dated 5/31/22:  1. Possible small hypointensity within inferior aspect of pituitary gland   2. Enlarged vessel emerging from ICA bifurcation area within lentiform area      Headache:    Some HA, feels stress related  Vision change:   Issues with blurry vision at a distance, wears contacts  Formal Visual fields:   Tim[place]o 6/16/22 - normal HVF and OCT, f/u prn    Galactorrhea:    None  Breast Tenderness  Only during her cycles  Menses:    Every 28 days, 4-5 days of bleeding     PCOS:    Hx of irregular cycles (constant bleeding) treated with OCP x 20 years.  Stopped OCP in her mid 30s.  After OCP stopped had regular cycles    The patient is not currently using any medication known to cause hyperprolactinemia such as: antipsychotics (risperidone, phenothiazines, haloperidol and butyrophenones),gastric motility drugs (metoclopramide and  "domperidone), or antihypertensives (methyldopa, reserpine, and verapamil).    She has since been started on Duloxetine which has a rare side effect profile of hyperprolactinemia and galactorrhea in some cases.      Lab Results   Component Value Date    PROLACTIN 42.1 (H) 09/18/2023    PROLACTIN 19.9 11/16/2022    PROLACTIN 14.1 06/23/2022       Current symptoms:    Thyroid:  [x]  fatigue [x]  Weight change (weight loss 20 lbs in about 10 months) [x]  temp intolerance (hot, chronic)      []  Loose stools or constipation []  skin/hair change [] palpitations []  tremor []  Denies    Lab Results   Component Value Date    TSH 2.617 09/18/2023    FREET4 0.90 06/23/2022       Growth Hormone Excess:    []  increase hand/foot size []  teeth spacing change    [x]  Denies    []  worse glycemic control/htn []  Carpal tunnel   [x]  Denies    No hyperhidrosis    Last IGF-1:   Lab Results   Component Value Date    SOMATMDN 129 09/18/2023    SOMATMDN 166 11/16/2022        Review of patient's allergies indicates:   Allergen Reactions    Grass pollen Other (See Comments)    House dust Itching         Current Outpatient Medications:     buPROPion (WELLBUTRIN XL) 150 MG TB24 tablet, Take 1 tablet (150 mg total) by mouth every other day., Disp: 90 tablet, Rfl: 3    diphenhydrAMINE-acetaminophen (TYLENOL PM EXTRA STRENGTH)  mg Tab, Take 1 tablet by mouth nightly as needed., Disp: , Rfl:     DULoxetine (CYMBALTA) 30 MG capsule, Take 1 capsule (30 mg total) by mouth once daily., Disp: 90 capsule, Rfl: 0    progesterone (PROMETRIUM) 200 MG capsule, Take 200 mg by mouth every evening., Disp: , Rfl:     VYVANSE 50 mg capsule, Take 1 capsule (50 mg total) by mouth every morning., Disp: 30 capsule, Rfl: 0    ROS: see HPI     Objective:   Physical Exam   /65 (BP Location: Left arm, Patient Position: Sitting, BP Method: Medium (Automatic))   Pulse 81   Ht 5' 3" (1.6 m)   Wt 75.7 kg (166 lb 14.2 oz)   BMI 29.56 kg/m²   Wt Readings " from Last 3 Encounters:   10/24/23 75.7 kg (166 lb 14.2 oz)   10/14/23 78.9 kg (174 lb)   09/13/23 79 kg (174 lb 2.6 oz)   ]    Constitutional:  Pleasant,  in no acute distress.   HENT:   Eyes:    No scleral icterus. Normal visual field exam.  Respiratory:  Effort normal   Neurological:  normal speech  Psych:  Normal mood and affect.         LABORATORY REVIEW:    Chemistry        Component Value Date/Time     09/18/2023 0904    K 3.7 09/18/2023 0904     09/18/2023 0904    CO2 26 09/18/2023 0904    BUN 10 09/18/2023 0904    CREATININE 1.1 09/18/2023 0904    GLU 95 09/18/2023 0904        Component Value Date/Time    CALCIUM 8.9 09/18/2023 0904    ALKPHOS 43 (L) 09/18/2023 0904    AST 19 09/18/2023 0904    ALT 18 09/18/2023 0904    BILITOT 0.4 09/18/2023 0904    ESTGFRAFRICA >60.0 06/07/2022 0835    EGFRNONAA >60.0 06/07/2022 0835          Assessment/Plan:     Problem List Items Addressed This Visit          Oncology    Pituitary tumor     Small hypoenhancing lesion on MRI from 2022, likely microprolactinoma.  Does not need repeat imaging unless significant rise in prolactin.            Endocrine    Elevated prolactin level     Small hypointensity on prior outside MRI imaging which may represent a microprolactinoma.  She is getting regular menstrual cycles but seeking pregnancy.  Prolactin with a recent increase.        Will check macroprolactin today before treatment decision is made.      If this is normal then no therapy should be needed with regards to prolactin.  If this is also elevated then we would consider restarting Cabergoline at 0.25 mg twice weekly given her desire to become pregnant.       Discussed that if she is no longer trying for pregnancy she does not need to be on Cabergoline therapy if her cycles continue normally and her prolactin is still elevated.     If labs and pregnancy desires point towards the need for cabergoline she was instructed to stop cabergoline if pregnant and let us  know.           Relevant Orders    Macroprolactin, Serum       Patient Instructions   As reviewed in the clinic we would like to repeat a different type of prolactin level to see if there truly is an elevation in the prolactin level in your blood stream.  If this returns as normal then we would not need to worry about this unless you develop abnormal menstrual cycles or have other symptoms concerning for elevated prolactin such as nipple discharge.    If you do develop labs in the future showing elevated prolactin and you have plans for pregnancy then we could consider starting a twice weekly medicine called Cabergoline.  This helps to lower prolactin levels and can help optimize chances for pregnancy if you desire.  If elevated and not planning to pursue therapy then you may not need any medication.      REVIEW WITH YOUR INSURANCE COMPANY IF THEY HAVE CHANGED THEIR COVERAGE WITH REGARDS TO FERTILITY TREATMENTS OR THERAPIES    Below is the information we usually share if we plan to start Cabergoline.  We are giving this to you in advance but as we await labs we may not need to start this but see below for what we would have you do if labs show we need to start this medicine.      Start cabergoline 0.25 mg (1/2 tablet) twice a week.  It is best if you take it at bedtime with a small healthy snack so you can sleep through any possible side effects.  I recommend using a pill box for this to make sure you are not missing any doses.  If later in the week you notice that you have missed a pill you can always make it up by taking it on a different day.  The important thing is that you take the same amount each week.      Sometimes cabergoline can cause a runny nose, dizziness, and upset stomach but it is usually not a problem if you take it at night with some food.  Please let me know if you have any bad side effects or if you are having trouble getting the medication.      At much higher doses this medication has been  used to treat other conditions like Parkinson's disease and at those high doses there have been some reports of behavior changes like excessive gambling or shopping.  We almost never see that happen with the small doses we use to treat high prolactin but if you notice any of these changes please let me know right away.          RTC 1 year, labs today (macroprolactin)    [facility], 6th Floor  [address] [place], [place], [address]    Office: [phone]  Fax: [phone]    Disclaimer: This note has been generated using voice-recognition software. There may be typographical errors that have been missed during proof-reading.    The above history labs imaging impression and plan were discussed with attending physician who is in agreement and also took part in this patient's care.  I personally reviewed all of the patients available medications, labs, imaging, vitals, allergies, medical history.

## 2023-10-24 NOTE — ASSESSMENT & PLAN NOTE
Small hypoenhancing lesion on MRI from 2022, likely microprolactinoma.  Does not need repeat imaging unless significant rise in prolactin.

## 2023-10-24 NOTE — PROGRESS NOTES
I have reviewed and concur with Dr. Victorino Gilliland's history, physical, assessment, and plan.  I have personally interviewed and examined the patient.      Patient with history of elevated prolactin with spontaneous normalization so was not started on cabergoline after her last visit.  Most recent prolactin was mildly elevated but she is having regular menstrual cycles with no symptoms of galactorrhea or breast tenderness.  Will check macroprolactin today and if on precipitated prolactin is elevated will restart cabergoline 0.25 mg (half tablet) twice weekly with repeat prolactin level in 2 months as she is still interested in becoming pregnant.      Loreto Bruce MD

## 2023-10-24 NOTE — PATIENT INSTRUCTIONS
As reviewed in the clinic we would like to repeat a different type of prolactin level to see if there truly is an elevation in the prolactin level in your blood stream.  If this returns as normal then we would not need to worry about this unless you develop abnormal menstrual cycles or have other symptoms concerning for elevated prolactin such as nipple discharge.    If you do develop labs in the future showing elevated prolactin and you have plans for pregnancy then we could consider starting a twice weekly medicine called Cabergoline.  This helps to lower prolactin levels and can help optimize chances for pregnancy if you desire.  If elevated and not planning to pursue therapy then you may not need any medication.      REVIEW WITH YOUR INSURANCE COMPANY IF THEY HAVE CHANGED THEIR COVERAGE WITH REGARDS TO FERTILITY TREATMENTS OR THERAPIES    Below is the information we usually share if we plan to start Cabergoline.  We are giving this to you in advance but as we await labs we may not need to start this but see below for what we would have you do if labs show we need to start this medicine.      Start cabergoline 0.25 mg (1/2 tablet) twice a week.  It is best if you take it at bedtime with a small healthy snack so you can sleep through any possible side effects.  I recommend using a pill box for this to make sure you are not missing any doses.  If later in the week you notice that you have missed a pill you can always make it up by taking it on a different day.  The important thing is that you take the same amount each week.      Sometimes cabergoline can cause a runny nose, dizziness, and upset stomach but it is usually not a problem if you take it at night with some food.  Please let me know if you have any bad side effects or if you are having trouble getting the medication.      At much higher doses this medication has been used to treat other conditions like Parkinson's disease and at those high doses there  have been some reports of behavior changes like excessive gambling or shopping.  We almost never see that happen with the small doses we use to treat high prolactin but if you notice any of these changes please let me know right away.

## 2023-10-27 LAB
ANNOTATION COMMENT IMP: NORMAL
MACROPROLACTIN SERPL-MCNC: 16.6 NG/ML (ref 3.4–18.5)
MACROPROLACTIN/PROLACTIN MFR SERPL: 14 %
PROLACTIN SERPL IA-MCNC: 19.4 NG/ML (ref 4.8–23.3)

## 2023-11-06 ENCOUNTER — PATIENT MESSAGE (OUTPATIENT)
Dept: PRIMARY CARE CLINIC | Facility: CLINIC | Age: 48
End: 2023-11-06
Payer: COMMERCIAL

## 2023-11-14 ENCOUNTER — PATIENT MESSAGE (OUTPATIENT)
Dept: PRIMARY CARE CLINIC | Facility: CLINIC | Age: 48
End: 2023-11-14
Payer: COMMERCIAL

## 2023-11-26 DIAGNOSIS — F41.9 ANXIETY AND DEPRESSION: ICD-10-CM

## 2023-11-26 DIAGNOSIS — F32.A ANXIETY AND DEPRESSION: ICD-10-CM

## 2023-11-26 NOTE — TELEPHONE ENCOUNTER
No care due was identified.  Health [facility] Embedded Care Due Messages. Reference number: [id number].   11/26/2023 2:04:50 PM CST

## 2023-11-27 RX ORDER — DULOXETIN HYDROCHLORIDE 30 MG/1
30 CAPSULE, DELAYED RELEASE ORAL DAILY
Qty: 90 CAPSULE | Refills: 0 | Status: SHIPPED | OUTPATIENT
Start: 2023-11-27

## 2023-11-30 DIAGNOSIS — F90.0 ATTENTION DEFICIT HYPERACTIVITY DISORDER (ADHD), PREDOMINANTLY INATTENTIVE TYPE: ICD-10-CM

## 2023-11-30 RX ORDER — LISDEXAMFETAMINE DIMESYLATE 50 MG/1
50 CAPSULE ORAL EVERY MORNING
Qty: 30 CAPSULE | Refills: 0 | Status: CANCELLED | OUTPATIENT
Start: 2023-11-30

## 2023-11-30 NOTE — TELEPHONE ENCOUNTER
No care due was identified.  Health [facility] Embedded Care Due Messages. Reference number: [id number].   11/30/2023 5:20:50 PM CST

## 2023-12-01 ENCOUNTER — PATIENT MESSAGE (OUTPATIENT)
Dept: PRIMARY CARE CLINIC | Facility: CLINIC | Age: 48
End: 2023-12-01
Payer: COMMERCIAL

## 2024-01-22 ENCOUNTER — TELEPHONE (OUTPATIENT)
Dept: OBSTETRICS AND GYNECOLOGY | Facility: CLINIC | Age: 49
End: 2024-01-22
Payer: COMMERCIAL

## 2024-01-22 NOTE — TELEPHONE ENCOUNTER
Former pt Dr Castaneda needs to be seen for vaginal bleeding discussing hormones. Pt # [phone]    1/22/24 @ 5039 Returned pt's call. No answer unable to leave a message. Will send portal message.

## 2024-01-25 ENCOUNTER — TELEPHONE (OUTPATIENT)
Dept: OBSTETRICS AND GYNECOLOGY | Facility: CLINIC | Age: 49
End: 2024-01-25
Payer: COMMERCIAL

## 2024-01-25 NOTE — TELEPHONE ENCOUNTER
----- Message from Kellie Garcia sent at 1/24/2024  1:25 PM CST -----  Regarding: np  Contact: [phone]  Patient is requesting a call back regarding establishing care with a provider that treats fibroids.   Would the patient rather a call back or a response via [employer]?  call  Best Call Back Number:  [phone]  Additional Information:

## 2024-01-26 ENCOUNTER — OFFICE VISIT (OUTPATIENT)
Dept: OBSTETRICS AND GYNECOLOGY | Facility: CLINIC | Age: 49
End: 2024-01-26
Attending: STUDENT IN AN ORGANIZED HEALTH CARE EDUCATION/TRAINING PROGRAM
Payer: COMMERCIAL

## 2024-01-26 ENCOUNTER — LAB VISIT (OUTPATIENT)
Dept: LAB | Facility: OTHER | Age: 49
End: 2024-01-26
Attending: STUDENT IN AN ORGANIZED HEALTH CARE EDUCATION/TRAINING PROGRAM
Payer: COMMERCIAL

## 2024-01-26 VITALS
HEIGHT: 63 IN | DIASTOLIC BLOOD PRESSURE: 74 MMHG | SYSTOLIC BLOOD PRESSURE: 122 MMHG | WEIGHT: 174.38 LBS | BODY MASS INDEX: 30.9 KG/M2

## 2024-01-26 DIAGNOSIS — N92.1 MENORRHAGIA WITH IRREGULAR CYCLE: ICD-10-CM

## 2024-01-26 DIAGNOSIS — Z01.419 ENCOUNTER FOR GYNECOLOGICAL EXAMINATION: Primary | ICD-10-CM

## 2024-01-26 DIAGNOSIS — Z11.51 ENCOUNTER FOR SCREENING FOR HUMAN PAPILLOMAVIRUS (HPV): ICD-10-CM

## 2024-01-26 DIAGNOSIS — Z12.4 ENCOUNTER FOR SCREENING FOR CERVICAL CANCER: ICD-10-CM

## 2024-01-26 LAB
ALBUMIN SERPL BCP-MCNC: 4.5 G/DL (ref 3.5–5.2)
ALP SERPL-CCNC: 49 U/L (ref 55–135)
ALT SERPL W/O P-5'-P-CCNC: 16 U/L (ref 10–44)
ANION GAP SERPL CALC-SCNC: 9 MMOL/L (ref 8–16)
AST SERPL-CCNC: 18 U/L (ref 10–40)
BASOPHILS # BLD AUTO: 0.03 K/UL (ref 0–0.2)
BASOPHILS NFR BLD: 0.3 % (ref 0–1.9)
BILIRUB SERPL-MCNC: 0.4 MG/DL (ref 0.1–1)
BUN SERPL-MCNC: 16 MG/DL (ref 6–20)
CALCIUM SERPL-MCNC: 9.5 MG/DL (ref 8.7–10.5)
CHLORIDE SERPL-SCNC: 106 MMOL/L (ref 95–110)
CO2 SERPL-SCNC: 24 MMOL/L (ref 23–29)
CREAT SERPL-MCNC: 1.1 MG/DL (ref 0.5–1.4)
DIFFERENTIAL METHOD BLD: ABNORMAL
EOSINOPHIL # BLD AUTO: 0.1 K/UL (ref 0–0.5)
EOSINOPHIL NFR BLD: 1.2 % (ref 0–8)
ERYTHROCYTE [DISTWIDTH] IN BLOOD BY AUTOMATED COUNT: 14.5 % (ref 11.5–14.5)
EST. GFR  (NO RACE VARIABLE): >60 ML/MIN/1.73 M^2
ESTRADIOL SERPL-MCNC: 18 PG/ML
FSH SERPL-ACNC: 11.95 MIU/ML
GLUCOSE SERPL-MCNC: 101 MG/DL (ref 70–110)
HCT VFR BLD AUTO: 43.9 % (ref 37–48.5)
HGB BLD-MCNC: 13.8 G/DL (ref 12–16)
IMM GRANULOCYTES # BLD AUTO: 0.03 K/UL (ref 0–0.04)
IMM GRANULOCYTES NFR BLD AUTO: 0.3 % (ref 0–0.5)
LYMPHOCYTES # BLD AUTO: 2 K/UL (ref 1–4.8)
LYMPHOCYTES NFR BLD: 20.9 % (ref 18–48)
MCH RBC QN AUTO: 26.8 PG (ref 27–31)
MCHC RBC AUTO-ENTMCNC: 31.4 G/DL (ref 32–36)
MCV RBC AUTO: 85 FL (ref 82–98)
MONOCYTES # BLD AUTO: 0.5 K/UL (ref 0.3–1)
MONOCYTES NFR BLD: 5.8 % (ref 4–15)
NEUTROPHILS # BLD AUTO: 6.7 K/UL (ref 1.8–7.7)
NEUTROPHILS NFR BLD: 71.5 % (ref 38–73)
NRBC BLD-RTO: 0 /100 WBC
PLATELET # BLD AUTO: 242 K/UL (ref 150–450)
PMV BLD AUTO: 10 FL (ref 9.2–12.9)
POTASSIUM SERPL-SCNC: 4 MMOL/L (ref 3.5–5.1)
PROT SERPL-MCNC: 7.7 G/DL (ref 6–8.4)
RBC # BLD AUTO: 5.15 M/UL (ref 4–5.4)
SODIUM SERPL-SCNC: 139 MMOL/L (ref 136–145)
TSH SERPL DL<=0.005 MIU/L-ACNC: 0.88 UIU/ML (ref 0.4–4)
WBC # BLD AUTO: 9.33 K/UL (ref 3.9–12.7)

## 2024-01-26 PROCEDURE — 1159F MED LIST DOCD IN RCRD: CPT | Mod: CPTII,S$GLB,, | Performed by: STUDENT IN AN ORGANIZED HEALTH CARE EDUCATION/TRAINING PROGRAM

## 2024-01-26 PROCEDURE — 85025 COMPLETE CBC W/AUTO DIFF WBC: CPT | Performed by: STUDENT IN AN ORGANIZED HEALTH CARE EDUCATION/TRAINING PROGRAM

## 2024-01-26 PROCEDURE — 82670 ASSAY OF TOTAL ESTRADIOL: CPT | Performed by: STUDENT IN AN ORGANIZED HEALTH CARE EDUCATION/TRAINING PROGRAM

## 2024-01-26 PROCEDURE — 83001 ASSAY OF GONADOTROPIN (FSH): CPT | Performed by: STUDENT IN AN ORGANIZED HEALTH CARE EDUCATION/TRAINING PROGRAM

## 2024-01-26 PROCEDURE — 80053 COMPREHEN METABOLIC PANEL: CPT | Performed by: STUDENT IN AN ORGANIZED HEALTH CARE EDUCATION/TRAINING PROGRAM

## 2024-01-26 PROCEDURE — 88175 CYTOPATH C/V AUTO FLUID REDO: CPT | Performed by: STUDENT IN AN ORGANIZED HEALTH CARE EDUCATION/TRAINING PROGRAM

## 2024-01-26 PROCEDURE — 3074F SYST BP LT 130 MM HG: CPT | Mod: CPTII,S$GLB,, | Performed by: STUDENT IN AN ORGANIZED HEALTH CARE EDUCATION/TRAINING PROGRAM

## 2024-01-26 PROCEDURE — 36415 COLL VENOUS BLD VENIPUNCTURE: CPT | Performed by: STUDENT IN AN ORGANIZED HEALTH CARE EDUCATION/TRAINING PROGRAM

## 2024-01-26 PROCEDURE — 99999 PR PBB SHADOW E&M-EST. PATIENT-LVL III: CPT | Mod: PBBFAC,,, | Performed by: STUDENT IN AN ORGANIZED HEALTH CARE EDUCATION/TRAINING PROGRAM

## 2024-01-26 PROCEDURE — 99396 PREV VISIT EST AGE 40-64: CPT | Mod: S$GLB,,, | Performed by: STUDENT IN AN ORGANIZED HEALTH CARE EDUCATION/TRAINING PROGRAM

## 2024-01-26 PROCEDURE — 1160F RVW MEDS BY RX/DR IN RCRD: CPT | Mod: CPTII,S$GLB,, | Performed by: STUDENT IN AN ORGANIZED HEALTH CARE EDUCATION/TRAINING PROGRAM

## 2024-01-26 PROCEDURE — 82166 ASSAY ANTI-MULLERIAN HORM: CPT | Performed by: STUDENT IN AN ORGANIZED HEALTH CARE EDUCATION/TRAINING PROGRAM

## 2024-01-26 PROCEDURE — 87624 HPV HI-RISK TYP POOLED RSLT: CPT | Performed by: STUDENT IN AN ORGANIZED HEALTH CARE EDUCATION/TRAINING PROGRAM

## 2024-01-26 PROCEDURE — 3008F BODY MASS INDEX DOCD: CPT | Mod: CPTII,S$GLB,, | Performed by: STUDENT IN AN ORGANIZED HEALTH CARE EDUCATION/TRAINING PROGRAM

## 2024-01-26 PROCEDURE — 84443 ASSAY THYROID STIM HORMONE: CPT | Performed by: STUDENT IN AN ORGANIZED HEALTH CARE EDUCATION/TRAINING PROGRAM

## 2024-01-26 PROCEDURE — 3078F DIAST BP <80 MM HG: CPT | Mod: CPTII,S$GLB,, | Performed by: STUDENT IN AN ORGANIZED HEALTH CARE EDUCATION/TRAINING PROGRAM

## 2024-01-29 ENCOUNTER — PATIENT MESSAGE (OUTPATIENT)
Dept: OBSTETRICS AND GYNECOLOGY | Facility: CLINIC | Age: 49
End: 2024-01-29
Payer: COMMERCIAL

## 2024-01-29 LAB — MIS SERPL-MCNC: 0.88 NG/ML

## 2024-01-30 LAB
FINAL PATHOLOGIC DIAGNOSIS: NORMAL
Lab: NORMAL

## 2024-02-01 LAB
HPV HR 12 DNA SPEC QL NAA+PROBE: NEGATIVE
HPV16 AG SPEC QL: NEGATIVE
HPV18 DNA SPEC QL NAA+PROBE: NEGATIVE

## 2024-02-07 ENCOUNTER — TELEPHONE (OUTPATIENT)
Dept: OBSTETRICS AND GYNECOLOGY | Facility: CLINIC | Age: 49
End: 2024-02-07
Payer: COMMERCIAL

## 2024-02-07 DIAGNOSIS — B96.89 BACTERIAL VAGINOSIS: Primary | ICD-10-CM

## 2024-02-07 DIAGNOSIS — N76.0 BACTERIAL VAGINOSIS: Primary | ICD-10-CM

## 2024-02-07 RX ORDER — METRONIDAZOLE 500 MG/1
500 TABLET ORAL EVERY 12 HOURS
Qty: 14 TABLET | Refills: 0 | Status: SHIPPED | OUTPATIENT
Start: 2024-02-07

## 2024-02-07 NOTE — TELEPHONE ENCOUNTER
2/7/2024 1146 AM. Called pt to inform NEG pap/hpv result. Pt did not answer. Left voice message informing pt of normal pap/hpv result and shift in delia suggesting BV. Informing pt via v/m that if she is not having symptoms disregard but if so she can give office a call. Office number provided for further questions/concerns.

## 2024-02-19 ENCOUNTER — OFFICE VISIT (OUTPATIENT)
Dept: OBSTETRICS AND GYNECOLOGY | Facility: CLINIC | Age: 49
End: 2024-02-19
Payer: COMMERCIAL

## 2024-02-19 ENCOUNTER — PATIENT MESSAGE (OUTPATIENT)
Dept: OBSTETRICS AND GYNECOLOGY | Facility: CLINIC | Age: 49
End: 2024-02-19

## 2024-02-19 VITALS
HEART RATE: 79 BPM | DIASTOLIC BLOOD PRESSURE: 88 MMHG | SYSTOLIC BLOOD PRESSURE: 145 MMHG | HEIGHT: 63 IN | BODY MASS INDEX: 30.89 KG/M2

## 2024-02-19 DIAGNOSIS — D25.2 INTRAMURAL AND SUBSEROUS LEIOMYOMA OF UTERUS: Primary | ICD-10-CM

## 2024-02-19 DIAGNOSIS — N94.6 DYSMENORRHEA: ICD-10-CM

## 2024-02-19 DIAGNOSIS — R35.0 URINE FREQUENCY: ICD-10-CM

## 2024-02-19 DIAGNOSIS — D25.1 INTRAMURAL AND SUBSEROUS LEIOMYOMA OF UTERUS: Primary | ICD-10-CM

## 2024-02-19 LAB
BILIRUB SERPL-MCNC: NORMAL MG/DL
BLOOD URINE, POC: 50
CLARITY, POC UA: CLEAR
COLOR, POC UA: COLORLESS
GLUCOSE UR QL STRIP: NORMAL
KETONES UR QL STRIP: NORMAL
LEUKOCYTE ESTERASE URINE, POC: NORMAL
NITRITE, POC UA: NORMAL
PH, POC UA: 6
PROTEIN, POC: NORMAL
SPECIFIC GRAVITY, POC UA: 1
UROBILINOGEN, POC UA: NORMAL

## 2024-02-19 PROCEDURE — 3079F DIAST BP 80-89 MM HG: CPT | Mod: CPTII,S$GLB,, | Performed by: STUDENT IN AN ORGANIZED HEALTH CARE EDUCATION/TRAINING PROGRAM

## 2024-02-19 PROCEDURE — 99999 PR PBB SHADOW E&M-EST. PATIENT-LVL III: CPT | Mod: PBBFAC,,, | Performed by: STUDENT IN AN ORGANIZED HEALTH CARE EDUCATION/TRAINING PROGRAM

## 2024-02-19 PROCEDURE — 99212 OFFICE O/P EST SF 10 MIN: CPT | Mod: S$GLB,,, | Performed by: STUDENT IN AN ORGANIZED HEALTH CARE EDUCATION/TRAINING PROGRAM

## 2024-02-19 PROCEDURE — 1159F MED LIST DOCD IN RCRD: CPT | Mod: CPTII,S$GLB,, | Performed by: STUDENT IN AN ORGANIZED HEALTH CARE EDUCATION/TRAINING PROGRAM

## 2024-02-19 PROCEDURE — 81002 URINALYSIS NONAUTO W/O SCOPE: CPT | Mod: S$GLB,,, | Performed by: STUDENT IN AN ORGANIZED HEALTH CARE EDUCATION/TRAINING PROGRAM

## 2024-02-19 PROCEDURE — 1160F RVW MEDS BY RX/DR IN RCRD: CPT | Mod: CPTII,S$GLB,, | Performed by: STUDENT IN AN ORGANIZED HEALTH CARE EDUCATION/TRAINING PROGRAM

## 2024-02-19 PROCEDURE — 3008F BODY MASS INDEX DOCD: CPT | Mod: CPTII,S$GLB,, | Performed by: STUDENT IN AN ORGANIZED HEALTH CARE EDUCATION/TRAINING PROGRAM

## 2024-02-19 PROCEDURE — 3077F SYST BP >= 140 MM HG: CPT | Mod: CPTII,S$GLB,, | Performed by: STUDENT IN AN ORGANIZED HEALTH CARE EDUCATION/TRAINING PROGRAM

## 2024-02-19 RX ORDER — DEXTROMETHORPHAN HYDROBROMIDE, BUPROPION HYDROCHLORIDE 105; 45 MG/1; MG/1
TABLET, MULTILAYER, EXTENDED RELEASE ORAL
COMMUNITY
Start: 2024-02-05

## 2024-02-19 NOTE — PROGRESS NOTES
"Chief Complaint: Follow up pelvic US     HPI:      Desiree Salas is a 48 y.o.  who presents today for follow up pelvic US performed for history of fibroids desiring consultation for fertility treatment. Pt with history of seeing fertility specialist in the past, has history of myomectomy and also reports several failed IUIs. She again is desiring to pursue fertility specialist, which was main reason performing pelvic US to rule out large fibroid uterus.   Otherwise, c/o painful menses, which only minimally improves with ibuprofen and tylenol, declines hormonal management options. Also notes urinary frequency.  Since last visit, pt has seen a Psychiatrist and was started on a new medication Auvelity 3 weeks ago. Has had GI upset for the past week, but was also on flagyl course, which she has since stopped. Still having diarrhea but improving. Also back on Vyvanse.   No other complaints or concerns today.    Menses are regular. Patient's last menstrual period was 2024 (approximate).     Physical Exam:      PHYSICAL EXAM:  BP (!) 145/88   Pulse 79   Ht 5' 3" (1.6 m)   LMP 2024 (Approximate)   BMI 30.89 kg/m²   Body mass index is 30.89 kg/m².     APPEARANCE: Well nourished, well developed, in no acute distress.    Results:     Pelvic US today  Uterus:  Size: 9.1 x 5 x 6.4 cm  Endometrium is normal caliber measuring 0.6 cm.  Approximately 3 intramural leiomyomas measure up to 2 cm.  These are clustered along the uterine body.  Subserosal leiomyoma posteriorly on the left measures 1.9 cm.     Right ovary:  Size: 2.2 x 1.6 x 2.2 cm     Left ovary:  Size: 2.8 x 2.1 x 3 cm     Free Fluid:  None.     Impression:  No acute process seen.  Intramural and subserosal leiomyomas measure up to 2 cm.    Assessment/Plan:     Intramural and subserous leiomyoma of uterus    Urine frequency  -     POCT urine dipstick without microscope    Dysmenorrhea      Fibroids  - discussed above US findings, small uterine " fibroids subserosal and intramural   - discussed hormonal options, declines, will send non-hormonal supplements for dysmenorrhea in portal    Fertility counseling  - counseled on risks of pregnancy at her age and she voiced understanding  - continues to desire pursuing fertility specialist, information given for [facility] per request    Urinary frequency  - urine dip negative  - A1C up to date and normal  - recommend decreasing bladder irritants, if persists Uro consult    Noelle Mark MD  Obstetrics and Gynecology  [facility]

## 2024-03-11 ENCOUNTER — CLINICAL SUPPORT (OUTPATIENT)
Dept: PSYCHIATRY | Facility: CLINIC | Age: 49
End: 2024-03-11
Payer: COMMERCIAL

## 2024-03-11 DIAGNOSIS — F90.0 ATTENTION DEFICIT HYPERACTIVITY DISORDER (ADHD), PREDOMINANTLY INATTENTIVE TYPE: ICD-10-CM

## 2024-03-11 DIAGNOSIS — F32.A ANXIETY AND DEPRESSION: ICD-10-CM

## 2024-03-11 DIAGNOSIS — F41.9 ANXIETY AND DEPRESSION: ICD-10-CM

## 2024-03-11 PROCEDURE — 99499 UNLISTED E&M SERVICE: CPT | Mod: S$GLB,,, | Performed by: PSYCHOLOGIST

## 2024-03-11 PROCEDURE — 99999 PR PBB SHADOW E&M-EST. PATIENT-LVL I: CPT | Mod: PBBFAC,,,

## 2024-03-11 NOTE — PROGRESS NOTES
ADHD Clinic Orientation Seminar       Patient's attendance: Attended in Full    Group Focus: ADHD Clinic Orientation Seminar    Group Start Time:  4:00 PM  Group End Time:   4:35 PM  Group Topic:  Behavioral Health  Group Department: [facility]  Group Facilitators:  eLa Elizondo, PhD; Ellie Reeder, MS; YASMANY Richey

## 2024-03-12 ENCOUNTER — PATIENT MESSAGE (OUTPATIENT)
Dept: PSYCHIATRY | Facility: CLINIC | Age: 49
End: 2024-03-12
Payer: COMMERCIAL

## 2024-06-02 ENCOUNTER — PATIENT MESSAGE (OUTPATIENT)
Dept: PRIMARY CARE CLINIC | Facility: CLINIC | Age: 49
End: 2024-06-02
Payer: COMMERCIAL

## 2024-06-02 DIAGNOSIS — Z12.31 ENCOUNTER FOR SCREENING MAMMOGRAM FOR MALIGNANT NEOPLASM OF BREAST: Primary | ICD-10-CM

## 2024-06-10 ENCOUNTER — PATIENT MESSAGE (OUTPATIENT)
Dept: PRIMARY CARE CLINIC | Facility: CLINIC | Age: 49
End: 2024-06-10
Payer: COMMERCIAL

## 2024-06-11 ENCOUNTER — E-VISIT (OUTPATIENT)
Dept: INTERNAL MEDICINE | Facility: CLINIC | Age: 49
End: 2024-06-11
Payer: COMMERCIAL

## 2024-06-11 DIAGNOSIS — F33.2 SEVERE EPISODE OF RECURRENT MAJOR DEPRESSIVE DISORDER, WITHOUT PSYCHOTIC FEATURES: Primary | ICD-10-CM

## 2024-06-11 PROCEDURE — 99421 OL DIG E/M SVC 5-10 MIN: CPT | Mod: ,,, | Performed by: STUDENT IN AN ORGANIZED HEALTH CARE EDUCATION/TRAINING PROGRAM

## 2024-06-12 NOTE — PROGRESS NOTES
Patient ID: Desiree Salas is a 49 y.o. female.    Chief Complaint: Depression    The patient initiated a request through [employer] on 6/11/2024 for evaluation and management with a chief complaint of Depression     I evaluated the questionnaire submission on 06/12/2024 .    [facility]    6/11/2024  6:47 PM CDT - Filed by Patient   Do you agree to participate in an E-Visit? Yes   If you have any of the following symptoms, please present to your local emergency room or call 911:  I acknowledge   Are you pregnant, could you be pregnant, or are you breast feeding? None of the above   What is the main issue you would like addressed today? Need a psychology/therapist appointment   Please describe your symptoms Therapist needed   Where is your problem located? Brain   How severe are your symptoms? Severe   Have you had these symptoms before? Yes   How long have you been having these symptoms? For more than a month   Please list any medications or treatments you have used for your condition and indicate if it was effective or not. Look at list in my chart   What makes this feel better? N/A   What makes this feel worse? Life   Are these symptoms related to a condition that you currently have? Yes   What is the condition? Major Depression   When were you last seen for this condition?    Please describe any probable cause for these symptoms Life   Provide any additional information you feel is important.    Please attach any relevant images or files    Are you able to take your vital signs? No         Encounter Diagnosis   Name Primary?    Severe episode of recurrent major depressive disorder, without psychotic features Yes        Orders Placed This Encounter   Procedures    Ambulatory referral/consult to Psychology     Standing Status:   Future     Standing Expiration Date:   7/12/2025     Referral Priority:   Routine     Referral Type:   Psychiatric     Referral Reason:   Specialty Services Required     Requested  Specialty:   Psychology     Number of Visits Requested:   1            No follow-ups on file.      E-Visit Time Tracking:    Day 1 Time (in minutes): 7    Total Time (in minutes): 7

## 2024-06-13 ENCOUNTER — PATIENT MESSAGE (OUTPATIENT)
Dept: PSYCHIATRY | Facility: CLINIC | Age: 49
End: 2024-06-13
Payer: COMMERCIAL

## 2024-06-17 NOTE — PROGRESS NOTES
Chief Complaint: Well Woman Exam     HPI:      Desiree Salas is a 49 y.o.  who presents for annual exam. Prior GYN care with Dr Castaneda. Patient's last menstrual period was 2024 (approximate).   Today patient's complaints include:  irregular cycles and discuss psychiatric care and fertility recommendations .  Irregular cycle - previously monthly cycle but now coming every 2-3 weeks for the past 3-4 mos; cycle has always been heavy for the first 2 days but now it is heavy the entire 6 days. Pt with known fibroid history, had a myomectomy in  when trying to conceive, most recent pelvic US  revealed 8 cm uterus with 2 small 1 cm fibroids.   Fertility - has tried IUI at [facility] in  without success, she decided to take a break from TTC and now desires to again.  Psychiatric medications - states her significant mood swings, irritability and depression symptoms have been significant since running out of her meds mainly Vyvanse. Pscyhiatrist left and looking for a new one. No SI/HI.  Ms. Salas is not currently sexually active.    Previous Pap: NILM, HPV negative ()  Previous Mammogram: BiRads: 2  (2023 at OSH)    Past Medical History:   Diagnosis Date    ADHD (attention deficit hyperactivity disorder), inattentive type     Anxiety     Asthma     exercise induced; no inhaler use    Depression     Pituitary tumor        Past Surgical History:   Procedure Laterality Date    COLONOSCOPY N/A 10/14/2023    Procedure: COLONOSCOPY;  Surgeon: Storm Poole MD;  Location: [address]);  Service: Endoscopy;  Laterality: N/A;  inst to email   r/s, PEG, updated instr. emailed to [email or web address]  10/9-precall complete-MS  10/13-pt cannot come in earlier than 7am-Kpvt    CYST REMOVAL      groin area    DILATION AND CURETTAGE OF UTERUS      due to heavy/irregular menses    EXCISION OF GANGLION OF WRIST Left 2018    Procedure: EXCISION, GANGLION CYST, WRIST LEFT;  Surgeon:  Walter Joyner Jr., MD;  Location: [facility];  Service: Plastics;  Laterality: Left;    MYOMECTOMY N/A 3/15/2019    Procedure: MYOMECTOMY OPEN;  Surgeon: Heena Castaneda MD;  Location: [facility];  Service: OB/GYN;  Laterality: N/A;  Dr. Becerra to asst       Social History     Socioeconomic History    Marital status: Single   Occupational History     Comment: Works at [employer] -    Tobacco Use    Smoking status: Former     Current packs/day: 0.00     Types: Cigarettes     Start date: 2015     Quit date: 2017     Years since quittin.9    Smokeless tobacco: Never    Tobacco comments:     1 pack in a week   Substance and Sexual Activity    Alcohol use: Not Currently    Drug use: Yes     Types: Marijuana     Comment: daily    Sexual activity: Yes     Partners: Male     Birth control/protection: Condom     Social Determinants of Health     Financial Resource Strain: Low Risk  (3/9/2024)    Overall Financial Resource Strain (CARDIA)     Difficulty of Paying Living Expenses: Not very hard   Food Insecurity: No Food Insecurity (3/9/2024)    Hunger Vital Sign     Worried About Running Out of Food in the Last Year: Never true     Ran Out of Food in the Last Year: Never true   Transportation Needs: No Transportation Needs (3/9/2024)    PRAPARE - Transportation     Lack of Transportation (Medical): No     Lack of Transportation (Non-Medical): No   Physical Activity: Inactive (3/9/2024)    Exercise Vital Sign     Days of Exercise per Week: 0 days     Minutes of Exercise per Session: 0 min   Stress: Stress Concern Present (3/9/2024)    [employer] - Occupational Stress Questionnaire     Feeling of Stress : Very much   Housing Stability: Low Risk  (3/9/2024)    Housing Stability Vital Sign     Unable to Pay for Housing in the Last Year: No     Number of Places Lived in the Last Year: 1     Unstable Housing in the Last Year: No       Family History   Problem Relation Name Age of  "Onset    Alzheimer's disease Maternal Grandmother      Diabetes Father      Hypertension Father      Diabetes Mother      Cancer Brother 1         unsure of the type    Migraines Sister 2     Breast cancer Neg Hx         Review of patient's allergies indicates:   Allergen Reactions    Grass pollen Other (See Comments)    House dust Itching       OB History          1    Para   0    Term   0       0    AB   1    Living   0         SAB   1    IAB   0    Ectopic   0    Multiple   0    Live Births   0                 Physical Exam:      PHYSICAL EXAM:  /74 (BP Location: Left arm, Patient Position: Sitting, BP Method: Medium (Manual))   Ht 5' 3" (1.6 m)   Wt 79.1 kg (174 lb 6.1 oz)   LMP 2024 (Approximate)   BMI 30.89 kg/m²   Body mass index is 30.89 kg/m².     APPEARANCE: Well nourished, well developed, in no acute distress.  PSYCH: Appropriate mood and affect.  SKIN: No acne or hirsutism  NECK: Neck symmetric without masses or thyromegaly  NODES: No inguinal, axillary, or supraclavicular lymph node enlargement  CHEST: Normal respiratory effort.  ABDOMEN: Soft.  No tenderness or masses.   BREASTS: Symmetrical, no visible skin lesions. No palpable masses. No nipple discharge bilaterally.  PELVIC: Normal external genitalia without lesions.  Normal hair distribution.  Adequate perineal body, normal urethral meatus.  Vagina moist and smooth. Without lesions. withoutdischarge.  Cervix pink, without lesions, discharge or tenderness.  No significant cystocele or rectocele.  Bimanual exam shows uterus to be normal size, regular, mobile and nontender.  Adnexa without masses or tenderness.      Assessment/Plan:     Encounter for gynecological examination    Encounter for screening for cervical cancer  -     Liquid-Based Pap Smear, Screening    Encounter for screening for human papillomavirus (HPV)  -     HPV High Risk Genotypes, PCR    Menorrhagia with irregular cycle  -     US Pelvis Comp with " Transvag NON-OB (xpd; Future  -     CBC Auto Differential; Future; Expected date: 01/26/2024  -     COMPREHENSIVE METABOLIC PANEL; Future; Expected date: 01/26/2024  -     TSH; Future; Expected date: 01/26/2024  -     ANTIMULLERIAN HORMONE ([facility]); Future; Expected date: 01/26/2024  -     ESTRADIOL; Future; Expected date: 01/26/2024  -     FOLLICLE STIMULATING HORMONE; Future; Expected date: 01/26/2024      - pelvic exam normal  - update pelvic US   - routine labs and AMH today  - has Pscyhiatry appt scheduled  - pap/hpv due and collected  - rtc for US/visit    Counseling:     Patient was counseled today on current ASCCP pap guidelines, the recommendation for yearly physical exams, safe driving habits, breast self awareness and annual mammograms. She is to see her PCP for other health maintenance.       Use of the [facility] Patient Portal discussed and encouraged during today's visit.

## 2024-07-02 LAB — BCS RECOMMENDATION EXT: NORMAL

## 2024-08-27 ENCOUNTER — PATIENT MESSAGE (OUTPATIENT)
Dept: PRIMARY CARE CLINIC | Facility: CLINIC | Age: 49
End: 2024-08-27
Payer: COMMERCIAL

## 2024-08-27 ENCOUNTER — PATIENT MESSAGE (OUTPATIENT)
Dept: OBSTETRICS AND GYNECOLOGY | Facility: CLINIC | Age: 49
End: 2024-08-27
Payer: COMMERCIAL

## 2024-09-19 ENCOUNTER — PATIENT MESSAGE (OUTPATIENT)
Dept: OBSTETRICS AND GYNECOLOGY | Facility: CLINIC | Age: 49
End: 2024-09-19
Payer: COMMERCIAL

## 2024-09-24 NOTE — TELEPHONE ENCOUNTER
Hello,  I forgot to include one experience/issue in my list of symptoms in the previous message. It hasnt happened in a while so I forgot. What does it mean when you get two periods a month? Sometimes my period will start a week or we can some change early go for two or three days stop for two or three days and then start up again, even heavier for another 2 to 5 days this happened to me in August but it hasnt happened in quite some time so I forgot to bring it up     H/o of fibroids    I told her its probably hormonal but could be related to fibroids?  Can monitor since its the first time its happened in a while.  She is no longer trying to conceive.

## 2024-09-24 NOTE — TELEPHONE ENCOUNTER
Yes I agree - it is more likely hormonal. Next occurrence, recommend she let us know to come in for visit. Thanks!

## 2024-10-24 ENCOUNTER — LAB VISIT (OUTPATIENT)
Dept: LAB | Facility: HOSPITAL | Age: 49
End: 2024-10-24
Attending: STUDENT IN AN ORGANIZED HEALTH CARE EDUCATION/TRAINING PROGRAM
Payer: COMMERCIAL

## 2024-10-24 ENCOUNTER — OFFICE VISIT (OUTPATIENT)
Dept: PRIMARY CARE CLINIC | Facility: CLINIC | Age: 49
End: 2024-10-24
Payer: COMMERCIAL

## 2024-10-24 VITALS
HEART RATE: 75 BPM | DIASTOLIC BLOOD PRESSURE: 76 MMHG | BODY MASS INDEX: 32.38 KG/M2 | WEIGHT: 182.75 LBS | HEIGHT: 63 IN | OXYGEN SATURATION: 97 % | SYSTOLIC BLOOD PRESSURE: 110 MMHG

## 2024-10-24 DIAGNOSIS — N95.1 PERIMENOPAUSE: ICD-10-CM

## 2024-10-24 DIAGNOSIS — F90.0 ATTENTION DEFICIT HYPERACTIVITY DISORDER (ADHD), PREDOMINANTLY INATTENTIVE TYPE: ICD-10-CM

## 2024-10-24 DIAGNOSIS — Z00.00 ANNUAL PHYSICAL EXAM: Primary | ICD-10-CM

## 2024-10-24 DIAGNOSIS — R53.83 FATIGUE, UNSPECIFIED TYPE: ICD-10-CM

## 2024-10-24 DIAGNOSIS — Z98.890 S/P MYOMECTOMY: ICD-10-CM

## 2024-10-24 DIAGNOSIS — R79.89 ELEVATED PROLACTIN LEVEL: ICD-10-CM

## 2024-10-24 DIAGNOSIS — E01.0 THYROMEGALY: ICD-10-CM

## 2024-10-24 DIAGNOSIS — Z00.00 ANNUAL PHYSICAL EXAM: ICD-10-CM

## 2024-10-24 DIAGNOSIS — E78.2 MIXED HYPERLIPIDEMIA: ICD-10-CM

## 2024-10-24 DIAGNOSIS — F41.9 ANXIETY AND DEPRESSION: ICD-10-CM

## 2024-10-24 DIAGNOSIS — F32.A ANXIETY AND DEPRESSION: ICD-10-CM

## 2024-10-24 LAB
ANION GAP SERPL CALC-SCNC: 7 MMOL/L (ref 8–16)
BASOPHILS # BLD AUTO: 0.03 K/UL (ref 0–0.2)
BASOPHILS NFR BLD: 0.4 % (ref 0–1.9)
BUN SERPL-MCNC: 10 MG/DL (ref 6–20)
CALCIUM SERPL-MCNC: 9.3 MG/DL (ref 8.7–10.5)
CHLORIDE SERPL-SCNC: 105 MMOL/L (ref 95–110)
CHOLEST SERPL-MCNC: 218 MG/DL (ref 120–199)
CHOLEST/HDLC SERPL: 3.2 {RATIO} (ref 2–5)
CO2 SERPL-SCNC: 24 MMOL/L (ref 23–29)
CREAT SERPL-MCNC: 1.2 MG/DL (ref 0.5–1.4)
DIFFERENTIAL METHOD BLD: ABNORMAL
EOSINOPHIL # BLD AUTO: 0.1 K/UL (ref 0–0.5)
EOSINOPHIL NFR BLD: 1.6 % (ref 0–8)
ERYTHROCYTE [DISTWIDTH] IN BLOOD BY AUTOMATED COUNT: 15.1 % (ref 11.5–14.5)
EST. GFR  (NO RACE VARIABLE): 56.2 ML/MIN/1.73 M^2
ESTIMATED AVG GLUCOSE: 103 MG/DL (ref 68–131)
FERRITIN SERPL-MCNC: 25 NG/ML (ref 20–300)
GLUCOSE SERPL-MCNC: 107 MG/DL (ref 70–110)
HBA1C MFR BLD: 5.2 % (ref 4–5.6)
HCT VFR BLD AUTO: 39.5 % (ref 37–48.5)
HDLC SERPL-MCNC: 68 MG/DL (ref 40–75)
HDLC SERPL: 31.2 % (ref 20–50)
HGB BLD-MCNC: 12.7 G/DL (ref 12–16)
IMM GRANULOCYTES # BLD AUTO: 0.03 K/UL (ref 0–0.04)
IMM GRANULOCYTES NFR BLD AUTO: 0.4 % (ref 0–0.5)
IRON SERPL-MCNC: 94 UG/DL (ref 30–160)
LDLC SERPL CALC-MCNC: 138.8 MG/DL (ref 63–159)
LYMPHOCYTES # BLD AUTO: 2.1 K/UL (ref 1–4.8)
LYMPHOCYTES NFR BLD: 25.9 % (ref 18–48)
MCH RBC QN AUTO: 28 PG (ref 27–31)
MCHC RBC AUTO-ENTMCNC: 32.2 G/DL (ref 32–36)
MCV RBC AUTO: 87 FL (ref 82–98)
MONOCYTES # BLD AUTO: 0.7 K/UL (ref 0.3–1)
MONOCYTES NFR BLD: 8.8 % (ref 4–15)
NEUTROPHILS # BLD AUTO: 5.2 K/UL (ref 1.8–7.7)
NEUTROPHILS NFR BLD: 62.9 % (ref 38–73)
NONHDLC SERPL-MCNC: 150 MG/DL
NRBC BLD-RTO: 0 /100 WBC
PLATELET # BLD AUTO: 232 K/UL (ref 150–450)
PMV BLD AUTO: 10.8 FL (ref 9.2–12.9)
POTASSIUM SERPL-SCNC: 3.8 MMOL/L (ref 3.5–5.1)
PROLACTIN SERPL IA-MCNC: 22.4 NG/ML (ref 5.2–26.5)
RBC # BLD AUTO: 4.53 M/UL (ref 4–5.4)
SATURATED IRON: 23 % (ref 20–50)
SODIUM SERPL-SCNC: 136 MMOL/L (ref 136–145)
TOTAL IRON BINDING CAPACITY: 406 UG/DL (ref 250–450)
TRANSFERRIN SERPL-MCNC: 274 MG/DL (ref 200–375)
TRIGL SERPL-MCNC: 56 MG/DL (ref 30–150)
TSH SERPL DL<=0.005 MIU/L-ACNC: 1.43 UIU/ML (ref 0.4–4)
VIT B12 SERPL-MCNC: 596 PG/ML (ref 210–950)
WBC # BLD AUTO: 8.19 K/UL (ref 3.9–12.7)

## 2024-10-24 PROCEDURE — 3074F SYST BP LT 130 MM HG: CPT | Mod: CPTII,S$GLB,, | Performed by: STUDENT IN AN ORGANIZED HEALTH CARE EDUCATION/TRAINING PROGRAM

## 2024-10-24 PROCEDURE — 80061 LIPID PANEL: CPT | Performed by: STUDENT IN AN ORGANIZED HEALTH CARE EDUCATION/TRAINING PROGRAM

## 2024-10-24 PROCEDURE — 36415 COLL VENOUS BLD VENIPUNCTURE: CPT | Mod: PN | Performed by: STUDENT IN AN ORGANIZED HEALTH CARE EDUCATION/TRAINING PROGRAM

## 2024-10-24 PROCEDURE — 83036 HEMOGLOBIN GLYCOSYLATED A1C: CPT | Performed by: STUDENT IN AN ORGANIZED HEALTH CARE EDUCATION/TRAINING PROGRAM

## 2024-10-24 PROCEDURE — 3008F BODY MASS INDEX DOCD: CPT | Mod: CPTII,S$GLB,, | Performed by: STUDENT IN AN ORGANIZED HEALTH CARE EDUCATION/TRAINING PROGRAM

## 2024-10-24 PROCEDURE — 84466 ASSAY OF TRANSFERRIN: CPT | Performed by: STUDENT IN AN ORGANIZED HEALTH CARE EDUCATION/TRAINING PROGRAM

## 2024-10-24 PROCEDURE — 1160F RVW MEDS BY RX/DR IN RCRD: CPT | Mod: CPTII,S$GLB,, | Performed by: STUDENT IN AN ORGANIZED HEALTH CARE EDUCATION/TRAINING PROGRAM

## 2024-10-24 PROCEDURE — 99396 PREV VISIT EST AGE 40-64: CPT | Mod: S$GLB,,, | Performed by: STUDENT IN AN ORGANIZED HEALTH CARE EDUCATION/TRAINING PROGRAM

## 2024-10-24 PROCEDURE — 99999 PR PBB SHADOW E&M-EST. PATIENT-LVL IV: CPT | Mod: PBBFAC,,, | Performed by: STUDENT IN AN ORGANIZED HEALTH CARE EDUCATION/TRAINING PROGRAM

## 2024-10-24 PROCEDURE — 80048 BASIC METABOLIC PNL TOTAL CA: CPT | Performed by: STUDENT IN AN ORGANIZED HEALTH CARE EDUCATION/TRAINING PROGRAM

## 2024-10-24 PROCEDURE — 84443 ASSAY THYROID STIM HORMONE: CPT | Performed by: STUDENT IN AN ORGANIZED HEALTH CARE EDUCATION/TRAINING PROGRAM

## 2024-10-24 PROCEDURE — 1159F MED LIST DOCD IN RCRD: CPT | Mod: CPTII,S$GLB,, | Performed by: STUDENT IN AN ORGANIZED HEALTH CARE EDUCATION/TRAINING PROGRAM

## 2024-10-24 PROCEDURE — 85025 COMPLETE CBC W/AUTO DIFF WBC: CPT | Performed by: STUDENT IN AN ORGANIZED HEALTH CARE EDUCATION/TRAINING PROGRAM

## 2024-10-24 PROCEDURE — 82728 ASSAY OF FERRITIN: CPT | Performed by: STUDENT IN AN ORGANIZED HEALTH CARE EDUCATION/TRAINING PROGRAM

## 2024-10-24 PROCEDURE — 83540 ASSAY OF IRON: CPT | Performed by: STUDENT IN AN ORGANIZED HEALTH CARE EDUCATION/TRAINING PROGRAM

## 2024-10-24 PROCEDURE — 3078F DIAST BP <80 MM HG: CPT | Mod: CPTII,S$GLB,, | Performed by: STUDENT IN AN ORGANIZED HEALTH CARE EDUCATION/TRAINING PROGRAM

## 2024-10-24 PROCEDURE — 82607 VITAMIN B-12: CPT | Performed by: STUDENT IN AN ORGANIZED HEALTH CARE EDUCATION/TRAINING PROGRAM

## 2024-10-24 PROCEDURE — 84146 ASSAY OF PROLACTIN: CPT | Performed by: STUDENT IN AN ORGANIZED HEALTH CARE EDUCATION/TRAINING PROGRAM

## 2024-10-24 RX ORDER — DULOXETIN HYDROCHLORIDE 30 MG/1
60 CAPSULE, DELAYED RELEASE ORAL DAILY
Start: 2024-10-24

## 2024-10-24 NOTE — PROGRESS NOTES
Desiree Salas  1975        Subjective     Chief Complaint: Annual    History of Present Illness:  Ms. Desiree Salas is a 49 y.o. female who presents to clinic for annual.    Feels like she is having premenopausal symptoms. Fatigue, brain fog. Cycles starting to change. Start and stop few days later. Some months skipping. Cramping is worse too. Had pelvic US 2/2024. About 3 on this new scan.  Had fibroidectomy in 3/2019.  Would like referral for OBGYN.   Was working with fertility clinic as well. No longer.      BP Readings from Last 5 Encounters:   10/24/24 110/76   02/19/24 (!) 145/88   01/26/24 122/74   10/24/23 112/65   10/14/23 110/65     ADHD- seeing Dr. Melvin Beverly.   On Vyvanse and Cymbalta 60 mg. +Vraylar    High prolactin- seeing Endo. + Neurosurgery. Dr. Jones. High prolactin- At this time, not interested in Cabergoline or pregnancy. Some HA.  Per that that note in 10/2023-->     Small hypoenhancing lesion on MRI from 2022, likely microprolactinoma.  Does not need repeat imaging unless significant rise in prolactin.    Small hypointensity on prior outside MRI imaging which may represent a microprolactinoma.  She is getting regular menstrual cycles but seeking pregnancy.  Prolactin with a recent increase.        Will check macroprolactin today before treatment decision is made.      If this is normal then no therapy should be needed with regards to prolactin.  If this is also elevated then we would consider restarting Cabergoline at 0.25 mg twice weekly given her desire to become pregnant.       Discussed that if she is no longer trying for pregnancy she does not need to be on Cabergoline therapy if her cycles continue normally and her prolactin is still elevated.     If labs and pregnancy desires point towards the need for cabergoline she was instructed to stop cabergoline if pregnant and let us know.               Colon- done 10/2023. Q5 yrs.  Mammo- done 6/2024. Was told normal. Will get  records.    Review of Systems   Constitutional:  Positive for malaise/fatigue. Negative for chills and fever.   HENT:  Positive for hearing loss.    Eyes:  Positive for discharge.   Respiratory:  Negative for wheezing.    Cardiovascular:  Negative for chest pain and palpitations.   Gastrointestinal:  Positive for diarrhea. Negative for blood in stool, constipation and vomiting.   Genitourinary:  Negative for dysuria and hematuria.   Musculoskeletal:  Positive for neck pain.   Neurological:  Positive for weakness and headaches.   Endo/Heme/Allergies:  Negative for polydipsia.        PAST HISTORY:     Past Medical History:   Diagnosis Date    ADHD (attention deficit hyperactivity disorder), inattentive type     Anxiety     Asthma     exercise induced; no inhaler use    Depression     Pituitary tumor        Past Surgical History:   Procedure Laterality Date    COLONOSCOPY N/A 10/14/2023    Procedure: COLONOSCOPY;  Surgeon: Storm Poole MD;  Location: [address];  Service: Endoscopy;  Laterality: N/A;  inst to email  9/11 r/s, PEG, updated instr. emailed to [email or web address]  10/9-precall complete-MS  10/13-pt cannot come in earlier than 7am-Kpvt    CYST REMOVAL  2016    groin area    DILATION AND CURETTAGE OF UTERUS  1998    due to heavy/irregular menses    EXCISION OF GANGLION OF WRIST Left 12/18/2018    Procedure: EXCISION, GANGLION CYST, WRIST LEFT;  Surgeon: Walter Joyner Jr., MD;  Location: [facility];  Service: Plastics;  Laterality: Left;    MYOMECTOMY N/A 3/15/2019    Procedure: MYOMECTOMY OPEN;  Surgeon: Heena Castaneda MD;  Location: [facility];  Service: OB/GYN;  Laterality: N/A;  Dr. Becerra to asst       Family History   Problem Relation Name Age of Onset    Alzheimer's disease Maternal Grandmother      Diabetes Father      Hypertension Father      Diabetes Mother      Cancer Brother 1         unsure of the type    Migraines Sister 2     Breast cancer Neg Hx           MEDICATIONS &  "ALLERGIES:     Current Outpatient Medications on File Prior to Visit   Medication Sig    AUVELITY  mg TbIE TAKE 1 TABLET BY MOUTH EVERY MORNING FOR 3 DAYS THEN TAKE 1 TABLET BY MOUTH TWICE DAILY    diphenhydrAMINE-acetaminophen (TYLENOL PM EXTRA STRENGTH)  mg Tab Take 1 tablet by mouth nightly as needed.    VYVANSE 50 mg capsule Take 1 capsule (50 mg total) by mouth every morning.    [DISCONTINUED] DULoxetine (CYMBALTA) 30 MG capsule Take 1 capsule (30 mg total) by mouth once daily.    [DISCONTINUED] buPROPion (WELLBUTRIN XL) 150 MG TB24 tablet Take 1 tablet (150 mg total) by mouth every other day. (Patient not taking: Reported on 2/19/2024)    [DISCONTINUED] metroNIDAZOLE (FLAGYL) 500 MG tablet Take 1 tablet (500 mg total) by mouth every 12 (twelve) hours.    [DISCONTINUED] progesterone (PROMETRIUM) 200 MG capsule Take 200 mg by mouth every evening.     No current facility-administered medications on file prior to visit.       Review of patient's allergies indicates:   Allergen Reactions    Grass pollen Other (See Comments)    House dust Itching       OBJECTIVE:     Vital Signs:  Vitals:    10/24/24 0845   BP: 110/76   BP Location: Left arm   Patient Position: Sitting   Pulse: 75   SpO2: 97%   Weight: 82.9 kg (182 lb 12.2 oz)   Height: 5' 3" (1.6 m)       Body mass index is 32.37 kg/m².     Physical Exam:  Physical Exam  Vitals and nursing note reviewed.   Constitutional:       General: She is not in acute distress.     Appearance: Normal appearance. She is not ill-appearing, toxic-appearing or diaphoretic.   HENT:      Head: Normocephalic and atraumatic.      Right Ear: Tympanic membrane, ear canal and external ear normal.      Left Ear: Tympanic membrane, ear canal and external ear normal.      Mouth/Throat:      Mouth: Mucous membranes are moist.      Pharynx: No oropharyngeal exudate or posterior oropharyngeal erythema.   Eyes:      General: No scleral icterus.        Right eye: No discharge.        " " Left eye: No discharge.      Conjunctiva/sclera: Conjunctivae normal.   Neck:      Comments: Mild thyromegaly  Cardiovascular:      Rate and Rhythm: Normal rate and regular rhythm.      Pulses: Normal pulses.      Heart sounds: Normal heart sounds. No murmur heard.  Pulmonary:      Effort: Pulmonary effort is normal. No respiratory distress.      Breath sounds: Normal breath sounds. No wheezing.   Musculoskeletal:         General: Normal range of motion.      Cervical back: Normal range of motion and neck supple. No rigidity or tenderness.      Right lower leg: No edema.      Left lower leg: No edema.   Lymphadenopathy:      Cervical: No cervical adenopathy.   Skin:     General: Skin is warm and dry.   Neurological:      Mental Status: She is alert and oriented to person, place, and time. Mental status is at baseline.      Gait: Gait normal.   Psychiatric:         Mood and Affect: Mood normal.         Behavior: Behavior normal.            Laboratory  Lab Results   Component Value Date     01/26/2024     01/26/2024    K 4.0 01/26/2024     01/26/2024    CO2 24 01/26/2024    BUN 16 01/26/2024    CREATININE 1.1 01/26/2024    CALCIUM 9.5 01/26/2024     Lab Results   Component Value Date    HGBA1C 5.3 09/18/2023     No results for input(s): "POCTGLUCOSE" in the last 72 hours.        ASSESSMENT & PLAN:   Ms. Desiree Salas is a 49 y.o. female who was seen today in clinic for annual.     1. Annual physical exam  -     BASIC METABOLIC PANEL; Future; Expected date: 10/24/2024  -     CBC W/ AUTO DIFFERENTIAL; Future; Expected date: 10/24/2024  -     TSH; Future; Expected date: 10/24/2024  -     IRON AND TIBC; Future; Expected date: 10/24/2024  -     Ferritin; Future  -     VITAMIN B12; Future; Expected date: 10/24/2024  -     HEMOGLOBIN A1C; Future; Expected date: 10/24/2024  -     LIPID PANEL; Future; Expected date: 10/24/2024    2. Perimenopause  -     Ambulatory referral/consult to Obstetrics / " Gynecology; Future; Expected date: 10/31/2024  -     BASIC METABOLIC PANEL; Future; Expected date: 10/24/2024  -     CBC W/ AUTO DIFFERENTIAL; Future; Expected date: 10/24/2024  -     TSH; Future; Expected date: 10/24/2024  -     IRON AND TIBC; Future; Expected date: 10/24/2024  -     Ferritin; Future  -     VITAMIN B12; Future; Expected date: 10/24/2024  -     HEMOGLOBIN A1C; Future; Expected date: 10/24/2024  -     LIPID PANEL; Future; Expected date: 10/24/2024    3. Anxiety and depression  -     BASIC METABOLIC PANEL; Future; Expected date: 10/24/2024  -     CBC W/ AUTO DIFFERENTIAL; Future; Expected date: 10/24/2024  -     TSH; Future; Expected date: 10/24/2024  -     IRON AND TIBC; Future; Expected date: 10/24/2024  -     Ferritin; Future  -     VITAMIN B12; Future; Expected date: 10/24/2024  -     HEMOGLOBIN A1C; Future; Expected date: 10/24/2024  -     LIPID PANEL; Future; Expected date: 10/24/2024  -     DULoxetine (CYMBALTA) 30 MG capsule; Take 2 capsules (60 mg total) by mouth once daily.    4. Attention deficit hyperactivity disorder (ADHD), predominantly inattentive type  -     BASIC METABOLIC PANEL; Future; Expected date: 10/24/2024  -     CBC W/ AUTO DIFFERENTIAL; Future; Expected date: 10/24/2024  -     TSH; Future; Expected date: 10/24/2024  -     IRON AND TIBC; Future; Expected date: 10/24/2024  -     Ferritin; Future  -     VITAMIN B12; Future; Expected date: 10/24/2024  -     HEMOGLOBIN A1C; Future; Expected date: 10/24/2024  -     LIPID PANEL; Future; Expected date: 10/24/2024    5. S/P myomectomy  -     BASIC METABOLIC PANEL; Future; Expected date: 10/24/2024  -     CBC W/ AUTO DIFFERENTIAL; Future; Expected date: 10/24/2024  -     TSH; Future; Expected date: 10/24/2024  -     IRON AND TIBC; Future; Expected date: 10/24/2024  -     Ferritin; Future  -     VITAMIN B12; Future; Expected date: 10/24/2024  -     HEMOGLOBIN A1C; Future; Expected date: 10/24/2024  -     LIPID PANEL; Future; Expected  date: 10/24/2024    6. Elevated prolactin level  -     BASIC METABOLIC PANEL; Future; Expected date: 10/24/2024  -     CBC W/ AUTO DIFFERENTIAL; Future; Expected date: 10/24/2024  -     TSH; Future; Expected date: 10/24/2024  -     IRON AND TIBC; Future; Expected date: 10/24/2024  -     Ferritin; Future  -     VITAMIN B12; Future; Expected date: 10/24/2024  -     HEMOGLOBIN A1C; Future; Expected date: 10/24/2024  -     LIPID PANEL; Future; Expected date: 10/24/2024  -     PROLACTIN; Future; Expected date: 10/24/2024    7. Mixed hyperlipidemia  -     BASIC METABOLIC PANEL; Future; Expected date: 10/24/2024  -     CBC W/ AUTO DIFFERENTIAL; Future; Expected date: 10/24/2024  -     TSH; Future; Expected date: 10/24/2024  -     IRON AND TIBC; Future; Expected date: 10/24/2024  -     Ferritin; Future  -     VITAMIN B12; Future; Expected date: 10/24/2024  -     HEMOGLOBIN A1C; Future; Expected date: 10/24/2024  -     LIPID PANEL; Future; Expected date: 10/24/2024    8. Fatigue, unspecified type  -     BASIC METABOLIC PANEL; Future; Expected date: 10/24/2024  -     CBC W/ AUTO DIFFERENTIAL; Future; Expected date: 10/24/2024  -     TSH; Future; Expected date: 10/24/2024  -     IRON AND TIBC; Future; Expected date: 10/24/2024  -     Ferritin; Future  -     VITAMIN B12; Future; Expected date: 10/24/2024  -     HEMOGLOBIN A1C; Future; Expected date: 10/24/2024  -     LIPID PANEL; Future; Expected date: 10/24/2024  -     DULoxetine (CYMBALTA) 30 MG capsule; Take 2 capsules (60 mg total) by mouth once daily.  -     PROLACTIN; Future; Expected date: 10/24/2024  -     Ambulatory referral/consult to Sleep Disorders; Future; Expected date: 10/31/2024    9. Thyromegaly  -     US Soft Tissue Head Neck; Future; Expected date: 10/24/2024            Mallika Galvan MD         Answers submitted by the patient for this visit:  Review of Systems Questionnaire (Submitted on 10/23/2024)  activity change: No  unexpected weight change:  Yes  rhinorrhea: No  trouble swallowing: No  visual disturbance: No  chest tightness: No  polyuria: No  difficulty urinating: No  menstrual problem: Yes  joint swelling: No  arthralgias: Yes  confusion: Yes  dysphoric mood: Yes

## 2024-10-29 ENCOUNTER — TELEPHONE (OUTPATIENT)
Dept: OBSTETRICS AND GYNECOLOGY | Facility: CLINIC | Age: 49
End: 2024-10-29
Payer: COMMERCIAL

## 2024-10-29 ENCOUNTER — HOSPITAL ENCOUNTER (OUTPATIENT)
Dept: RADIOLOGY | Facility: OTHER | Age: 49
Discharge: HOME | End: 2024-10-29
Attending: STUDENT IN AN ORGANIZED HEALTH CARE EDUCATION/TRAINING PROGRAM
Payer: COMMERCIAL

## 2024-10-29 ENCOUNTER — PATIENT MESSAGE (OUTPATIENT)
Dept: PRIMARY CARE CLINIC | Facility: CLINIC | Age: 49
End: 2024-10-29
Payer: COMMERCIAL

## 2024-10-29 DIAGNOSIS — E01.0 THYROMEGALY: ICD-10-CM

## 2024-10-29 PROCEDURE — 76536 US EXAM OF HEAD AND NECK: CPT | Mod: 26,,, | Performed by: INTERNAL MEDICINE

## 2024-10-29 PROCEDURE — 76536 US EXAM OF HEAD AND NECK: CPT | Mod: TC

## 2024-10-30 ENCOUNTER — OFFICE VISIT (OUTPATIENT)
Dept: OBSTETRICS AND GYNECOLOGY | Facility: CLINIC | Age: 49
End: 2024-10-30
Payer: COMMERCIAL

## 2024-10-30 ENCOUNTER — LAB VISIT (OUTPATIENT)
Dept: LAB | Facility: HOSPITAL | Age: 49
End: 2024-10-30
Attending: NURSE PRACTITIONER
Payer: COMMERCIAL

## 2024-10-30 VITALS
WEIGHT: 183 LBS | DIASTOLIC BLOOD PRESSURE: 76 MMHG | SYSTOLIC BLOOD PRESSURE: 118 MMHG | BODY MASS INDEX: 32.43 KG/M2 | HEIGHT: 63 IN

## 2024-10-30 DIAGNOSIS — R63.5 WEIGHT GAIN: ICD-10-CM

## 2024-10-30 DIAGNOSIS — N95.1 PERIMENOPAUSE: Primary | ICD-10-CM

## 2024-10-30 DIAGNOSIS — Z86.018 HISTORY OF UTERINE FIBROID: ICD-10-CM

## 2024-10-30 DIAGNOSIS — N92.6 IRREGULAR BLEEDING: ICD-10-CM

## 2024-10-30 DIAGNOSIS — N94.6 DYSMENORRHEA: ICD-10-CM

## 2024-10-30 DIAGNOSIS — N95.1 PERIMENOPAUSE: ICD-10-CM

## 2024-10-30 DIAGNOSIS — D21.9 FIBROID: ICD-10-CM

## 2024-10-30 DIAGNOSIS — R68.82 LOW LIBIDO: ICD-10-CM

## 2024-10-30 PROCEDURE — 3074F SYST BP LT 130 MM HG: CPT | Mod: CPTII,S$GLB,, | Performed by: NURSE PRACTITIONER

## 2024-10-30 PROCEDURE — 36415 COLL VENOUS BLD VENIPUNCTURE: CPT | Mod: PN | Performed by: NURSE PRACTITIONER

## 2024-10-30 PROCEDURE — 3078F DIAST BP <80 MM HG: CPT | Mod: CPTII,S$GLB,, | Performed by: NURSE PRACTITIONER

## 2024-10-30 PROCEDURE — 99999 PR PBB SHADOW E&M-EST. PATIENT-LVL IV: CPT | Mod: PBBFAC,,, | Performed by: NURSE PRACTITIONER

## 2024-10-30 PROCEDURE — 3044F HG A1C LEVEL LT 7.0%: CPT | Mod: CPTII,S$GLB,, | Performed by: NURSE PRACTITIONER

## 2024-10-30 PROCEDURE — 3008F BODY MASS INDEX DOCD: CPT | Mod: CPTII,S$GLB,, | Performed by: NURSE PRACTITIONER

## 2024-10-30 PROCEDURE — 1160F RVW MEDS BY RX/DR IN RCRD: CPT | Mod: CPTII,S$GLB,, | Performed by: NURSE PRACTITIONER

## 2024-10-30 PROCEDURE — 1159F MED LIST DOCD IN RCRD: CPT | Mod: CPTII,S$GLB,, | Performed by: NURSE PRACTITIONER

## 2024-10-30 PROCEDURE — 82166 ASSAY ANTI-MULLERIAN HORM: CPT | Performed by: NURSE PRACTITIONER

## 2024-10-30 PROCEDURE — 99214 OFFICE O/P EST MOD 30 MIN: CPT | Mod: S$GLB,,, | Performed by: NURSE PRACTITIONER

## 2024-10-31 ENCOUNTER — OFFICE VISIT (OUTPATIENT)
Dept: SLEEP MEDICINE | Facility: CLINIC | Age: 49
End: 2024-10-31
Payer: COMMERCIAL

## 2024-10-31 VITALS
HEIGHT: 63 IN | SYSTOLIC BLOOD PRESSURE: 129 MMHG | DIASTOLIC BLOOD PRESSURE: 84 MMHG | HEART RATE: 80 BPM | BODY MASS INDEX: 32.5 KG/M2 | WEIGHT: 183.44 LBS

## 2024-10-31 DIAGNOSIS — R06.83 SNORING: Primary | ICD-10-CM

## 2024-10-31 DIAGNOSIS — R40.0 SOMNOLENCE: ICD-10-CM

## 2024-10-31 DIAGNOSIS — F51.01 PRIMARY INSOMNIA: ICD-10-CM

## 2024-10-31 DIAGNOSIS — R53.83 FATIGUE, UNSPECIFIED TYPE: ICD-10-CM

## 2024-10-31 LAB — MIS SERPL-MCNC: 0.56 NG/ML

## 2024-10-31 PROCEDURE — 3079F DIAST BP 80-89 MM HG: CPT | Mod: CPTII,S$GLB,, | Performed by: NURSE PRACTITIONER

## 2024-10-31 PROCEDURE — 1159F MED LIST DOCD IN RCRD: CPT | Mod: CPTII,S$GLB,, | Performed by: NURSE PRACTITIONER

## 2024-10-31 PROCEDURE — 1160F RVW MEDS BY RX/DR IN RCRD: CPT | Mod: CPTII,S$GLB,, | Performed by: NURSE PRACTITIONER

## 2024-10-31 PROCEDURE — 3008F BODY MASS INDEX DOCD: CPT | Mod: CPTII,S$GLB,, | Performed by: NURSE PRACTITIONER

## 2024-10-31 PROCEDURE — 3074F SYST BP LT 130 MM HG: CPT | Mod: CPTII,S$GLB,, | Performed by: NURSE PRACTITIONER

## 2024-10-31 PROCEDURE — 3044F HG A1C LEVEL LT 7.0%: CPT | Mod: CPTII,S$GLB,, | Performed by: NURSE PRACTITIONER

## 2024-10-31 PROCEDURE — 99999 PR PBB SHADOW E&M-EST. PATIENT-LVL III: CPT | Mod: PBBFAC,,, | Performed by: NURSE PRACTITIONER

## 2024-10-31 PROCEDURE — 99214 OFFICE O/P EST MOD 30 MIN: CPT | Mod: S$GLB,,, | Performed by: NURSE PRACTITIONER

## 2024-11-01 ENCOUNTER — TELEPHONE (OUTPATIENT)
Dept: BARIATRICS | Facility: CLINIC | Age: 49
End: 2024-11-01
Payer: COMMERCIAL

## 2024-11-04 ENCOUNTER — PATIENT MESSAGE (OUTPATIENT)
Dept: OBSTETRICS AND GYNECOLOGY | Facility: CLINIC | Age: 49
End: 2024-11-04
Payer: COMMERCIAL

## 2024-11-04 DIAGNOSIS — N28.9 KIDNEY FUNCTION ABNORMAL: Primary | ICD-10-CM

## 2024-11-04 DIAGNOSIS — R63.5 WEIGHT GAIN: Primary | ICD-10-CM

## 2024-11-13 ENCOUNTER — PATIENT MESSAGE (OUTPATIENT)
Dept: SLEEP MEDICINE | Facility: CLINIC | Age: 49
End: 2024-11-13
Payer: COMMERCIAL

## 2024-11-25 ENCOUNTER — PATIENT MESSAGE (OUTPATIENT)
Dept: OBSTETRICS AND GYNECOLOGY | Facility: CLINIC | Age: 49
End: 2024-11-25
Payer: COMMERCIAL

## 2024-11-26 DIAGNOSIS — G47.19 OTHER HYPERSOMNIA: Primary | ICD-10-CM

## 2024-11-26 DIAGNOSIS — R06.83 SNORING: ICD-10-CM

## 2024-11-26 DIAGNOSIS — G47.00 INSOMNIA, UNSPECIFIED TYPE: ICD-10-CM

## 2024-12-02 ENCOUNTER — LAB VISIT (OUTPATIENT)
Dept: LAB | Facility: HOSPITAL | Age: 49
End: 2024-12-02
Attending: STUDENT IN AN ORGANIZED HEALTH CARE EDUCATION/TRAINING PROGRAM
Payer: COMMERCIAL

## 2024-12-02 DIAGNOSIS — N28.9 KIDNEY FUNCTION ABNORMAL: ICD-10-CM

## 2024-12-02 LAB
ANION GAP SERPL CALC-SCNC: 10 MMOL/L (ref 8–16)
BUN SERPL-MCNC: 11 MG/DL (ref 6–20)
CALCIUM SERPL-MCNC: 9.2 MG/DL (ref 8.7–10.5)
CHLORIDE SERPL-SCNC: 107 MMOL/L (ref 95–110)
CO2 SERPL-SCNC: 22 MMOL/L (ref 23–29)
CREAT SERPL-MCNC: 1.1 MG/DL (ref 0.5–1.4)
EST. GFR  (NO RACE VARIABLE): >60 ML/MIN/1.73 M^2
GLUCOSE SERPL-MCNC: 97 MG/DL (ref 70–110)
POTASSIUM SERPL-SCNC: 4.2 MMOL/L (ref 3.5–5.1)
SODIUM SERPL-SCNC: 139 MMOL/L (ref 136–145)

## 2024-12-02 PROCEDURE — 36415 COLL VENOUS BLD VENIPUNCTURE: CPT | Mod: PN | Performed by: STUDENT IN AN ORGANIZED HEALTH CARE EDUCATION/TRAINING PROGRAM

## 2024-12-02 PROCEDURE — 80048 BASIC METABOLIC PNL TOTAL CA: CPT | Performed by: STUDENT IN AN ORGANIZED HEALTH CARE EDUCATION/TRAINING PROGRAM

## 2024-12-04 ENCOUNTER — PATIENT MESSAGE (OUTPATIENT)
Dept: PRIMARY CARE CLINIC | Facility: CLINIC | Age: 49
End: 2024-12-04
Payer: COMMERCIAL

## 2024-12-14 ENCOUNTER — PATIENT MESSAGE (OUTPATIENT)
Dept: PRIMARY CARE CLINIC | Facility: CLINIC | Age: 49
End: 2024-12-14
Payer: COMMERCIAL

## 2025-01-15 ENCOUNTER — PATIENT MESSAGE (OUTPATIENT)
Dept: SLEEP MEDICINE | Facility: CLINIC | Age: 50
End: 2025-01-15
Payer: COMMERCIAL

## 2025-01-22 ENCOUNTER — PATIENT MESSAGE (OUTPATIENT)
Dept: SLEEP MEDICINE | Facility: CLINIC | Age: 50
End: 2025-01-22
Payer: COMMERCIAL

## 2025-01-23 ENCOUNTER — PATIENT MESSAGE (OUTPATIENT)
Dept: OBSTETRICS AND GYNECOLOGY | Facility: CLINIC | Age: 50
End: 2025-01-23
Payer: COMMERCIAL

## 2025-01-24 ENCOUNTER — CLINICAL SUPPORT (OUTPATIENT)
Dept: OBSTETRICS AND GYNECOLOGY | Facility: CLINIC | Age: 50
End: 2025-01-24
Payer: COMMERCIAL

## 2025-01-24 DIAGNOSIS — N95.1 MENOPAUSAL SYMPTOMS: Primary | ICD-10-CM

## 2025-01-24 NOTE — PROGRESS NOTES
Personal Information    Full Name: Desiree Salas 1975    PCP- Dr. Mallika Galvan    Medical History    Do you have a chronic medical condition? (e.g., diabetes, hypertension, thyroid issues)   If yes, please specify:   No    2.   Do you have a history of hormone- related conditions? (e.g., endometriosis, breast cancer)   If yes, please explain:   No-- But she does have a marker in the left breast     3.   Have you previously or are you currently taking hormone replacement therapy?   If yes, please list:   No    Menstrual History    At what age did you begin menstruating?   13    2.   Have you experienced any changes in your menstrual cycle in the last year?   If yes, please describe:   Yes - sporadic- heavy to light flow    3.   When was your last menstrual period or age of last period?   1/14/2025    4.   Have you had a hysterectomy?   If yes, at what age?  No    Symptoms Assesments      Are you experiencing any of the following symptoms:  Hot Flashes: Yes   Night Sweats: Yes   Vaginal Dryness or Pain with [place]: Yes   Mood Swings: Yes   Anxiety or Depression: Yes Cymbalta 60 mg ,Vyvanse 50 mg, Vraylar 1.5 daily (nightly)& Auveltiy  2 tabs Daily    Sleep Disturbances: Yes   Fatigue: Yes   Weight Gain: Yes   Joint or Muscle Pain: Yes   Memory Issues or Brain Fog: Yes   Decreased Interest in Sex (Low Libido): Yes  Colonoscopy Yes 10/2023 Negative      Lifestyle Factors    How would you describe your diet?  Balanced    2.   How often do you exercise?   Rarely    3.   Do you smoke or consume alcohol?   If yes, please specify frequency:   Yes - Occasional - Marijuana recreational     4.   How would you rate your stress levels?   High    Family History    Is there a family history of menopause-related conditions? (e.g., osteoporosis, breast cancer)  If yes, please specify  No    2.   Is there a family history of heart disease?   If yes, please specify   No          Spoke with patient for a total of 30  minutes during virtual visit.  Patient was guided through expectations and treatment options.     Questions answered. Encouraged to send message or call office with any questions/concerns. Verbalized understanding.       Felipa

## 2025-01-27 ENCOUNTER — OFFICE VISIT (OUTPATIENT)
Facility: CLINIC | Age: 50
End: 2025-01-27
Payer: COMMERCIAL

## 2025-01-27 VITALS
SYSTOLIC BLOOD PRESSURE: 104 MMHG | HEIGHT: 63 IN | DIASTOLIC BLOOD PRESSURE: 69 MMHG | WEIGHT: 186.31 LBS | BODY MASS INDEX: 33.01 KG/M2

## 2025-01-27 DIAGNOSIS — R53.83 FATIGUE, UNSPECIFIED TYPE: ICD-10-CM

## 2025-01-27 DIAGNOSIS — N89.8 VAGINAL DRYNESS: Primary | ICD-10-CM

## 2025-01-27 DIAGNOSIS — N95.1 PERIMENOPAUSAL VASOMOTOR SYMPTOMS: ICD-10-CM

## 2025-01-27 DIAGNOSIS — R61 NIGHT SWEATS: ICD-10-CM

## 2025-01-27 PROCEDURE — 3074F SYST BP LT 130 MM HG: CPT | Mod: CPTII,S$GLB,, | Performed by: OBSTETRICS & GYNECOLOGY

## 2025-01-27 PROCEDURE — 99999 PR PBB SHADOW E&M-EST. PATIENT-LVL II: CPT | Mod: PBBFAC,,, | Performed by: OBSTETRICS & GYNECOLOGY

## 2025-01-27 PROCEDURE — 3008F BODY MASS INDEX DOCD: CPT | Mod: CPTII,S$GLB,, | Performed by: OBSTETRICS & GYNECOLOGY

## 2025-01-27 PROCEDURE — 3078F DIAST BP <80 MM HG: CPT | Mod: CPTII,S$GLB,, | Performed by: OBSTETRICS & GYNECOLOGY

## 2025-01-27 PROCEDURE — 99214 OFFICE O/P EST MOD 30 MIN: CPT | Mod: S$GLB,,, | Performed by: OBSTETRICS & GYNECOLOGY

## 2025-01-27 RX ORDER — ESTRADIOL 0.1 MG/G
1 CREAM VAGINAL DAILY
Qty: 42 G | Refills: 4 | Status: SHIPPED | OUTPATIENT
Start: 2025-01-27

## 2025-01-27 RX ORDER — ESTRADIOL 0.04 MG/D
1 FILM, EXTENDED RELEASE TRANSDERMAL
Qty: 8 PATCH | Refills: 11 | Status: SHIPPED | OUTPATIENT
Start: 2025-01-27

## 2025-01-27 RX ORDER — PROGESTERONE 100 MG/1
100 CAPSULE ORAL NIGHTLY
Qty: 90 CAPSULE | Refills: 3 | Status: SHIPPED | OUTPATIENT
Start: 2025-01-27 | End: 2026-01-27

## 2025-01-27 NOTE — PROGRESS NOTES
[facility] Hormone Consult Preparation Sheet    Patient Name: Desiree Salas 49 y.o. perimenopausal female who presents for hormone consult.  Patient states that she has had hot flashes and night sweats x 1 year.  She states that the night sweats keep her up at night.  She goes to bed  between 11pm-12am and wakes up around 7:30am. She has trouble falling asleep. She states that her hip pain also keeps her up at night.  She does snore and has an order to schedule a sleep study.  She has chronic fatigue and mood swings but feels that it has gotten worse over the last month.  She does have a history of ADHD, depression and anxiety. She has brain fog with word recall.  She has a history of R hip and bilateral knee pain that has increased in severity over the last year.  She is unsure if her weight gain of 20lbs over the last year is contributing to her symptoms. She also reports vaginal dryness x 4 years and decreased libido described as decreased desire and arousal.  Currently not sexually active.    Provider: Haley Marquis    Patient History:  Patient Active Problem List   Diagnosis    Anxiety and depression    Attention deficit hyperactivity disorder (ADHD), predominantly inattentive type    Ganglion cyst of wrist, left    S/P myomectomy    Right leg paresthesias    Chronic midline low back pain without sciatica    Chronic hip pain, right    Nasal septal deviation    Sensorineural hearing loss (SNHL) of both ears    Chronic pain of both knees    Hypercholesteremia    Snoring    Pituitary tumor    Elevated prolactin level    Brain vascular malformation    Fatigue       Patient Medications:  Current Outpatient Medications on File Prior to Visit   Medication Sig    AUVELITY  mg TbIE TAKE 1 TABLET BY MOUTH EVERY MORNING FOR 3 DAYS THEN TAKE 1 TABLET BY MOUTH TWICE DAILY    diphenhydrAMINE-acetaminophen (TYLENOL PM EXTRA STRENGTH)  mg Tab Take 1 tablet by mouth nightly as needed.     "DULoxetine (CYMBALTA) 30 MG capsule Take 2 capsules (60 mg total) by mouth once daily.    VYVANSE 50 mg capsule Take 1 capsule (50 mg total) by mouth every morning.     No current facility-administered medications on file prior to visit.       Patient Imaging and Procedures  LMP: No LMP recorded.  PAP: 1/30/2024  HPV: No results found for: "HPV"  Mammo: needs MMG, ordered but not scheduled  Colonoscopy: Last Colonoscopy completed on 10/14/2023  BMI: There is no height or weight on file to calculate BMI.    Recent Labs:  Estradiol:   Estradiol   Date Value Ref Range Status   01/26/2024 18 See Text pg/mL Final     Comment:     Estradiol Reference Ranges (Female):  Follicular phase:  pg/mL  Midcycle:          pg/mL  Luteal phase:      pg/mL  Post-menopausal(Not on HRT): <10-28 pg/mL  Post-menopausal(On HRT): < pg/mL  Males: 11-44 pg/mL    The drug Fulvestrant (Faslodex) may interfere with the   assay leading to falsely elevated Estradiol results.    Patients treated with Mifepristone should not be tested with  the  or Alinity I Estradiol assay for up to two   weeks due to the interference of the drug in this assay.       Testosterone: No results found for: "TESTOSTERONE"  FSH:   Follicle Stimulating Hormone   Date Value Ref Range Status   01/26/2024 11.95 See Text mIU/mL Final     Comment:     Female Reference Ranges:  Follicular Phase.................3.03-8.08 mIU/mL  Midcycle Peak....................2.55-16.69 mIU/mL  Luteal Phase.....................1.38-5.47 mIU/mL  Postmenopausal...................26..41 mIU/mL  Male Reference Range:............0.95-11.95 mIU/mL       CBC:   Lab Results   Component Value Date    WBC 8.19 10/24/2024    HGB 12.7 10/24/2024    HCT 39.5 10/24/2024    MCV 87 10/24/2024     10/24/2024       CMP:  Sodium   Date Value Ref Range Status   12/02/2024 139 136 - 145 mmol/L Final     Potassium   Date Value Ref Range Status   12/02/2024 4.2 3.5 - 5.1 " mmol/L Final     Chloride   Date Value Ref Range Status   12/02/2024 107 95 - 110 mmol/L Final     CO2   Date Value Ref Range Status   12/02/2024 22 (L) 23 - 29 mmol/L Final     Glucose   Date Value Ref Range Status   12/02/2024 97 70 - 110 mg/dL Final     BUN   Date Value Ref Range Status   12/02/2024 11 6 - 20 mg/dL Final     Creatinine   Date Value Ref Range Status   12/02/2024 1.1 0.5 - 1.4 mg/dL Final     Calcium   Date Value Ref Range Status   12/02/2024 9.2 8.7 - 10.5 mg/dL Final     Total Protein   Date Value Ref Range Status   01/26/2024 7.7 6.0 - 8.4 g/dL Final     Albumin   Date Value Ref Range Status   01/26/2024 4.5 3.5 - 5.2 g/dL Final     Total Bilirubin   Date Value Ref Range Status   01/26/2024 0.4 0.1 - 1.0 mg/dL Final     Comment:     For infants and newborns, interpretation of results should be based  on gestational age, weight and in agreement with clinical  observations.    Premature Infant recommended reference ranges:  Up to 24 hours.............<8.0 mg/dL  Up to 48 hours............<12.0 mg/dL  3-5 days..................<15.0 mg/dL  6-29 days.................<15.0 mg/dL       Alkaline Phosphatase   Date Value Ref Range Status   01/26/2024 49 (L) 55 - 135 U/L Final     AST   Date Value Ref Range Status   01/26/2024 18 10 - 40 U/L Final     ALT   Date Value Ref Range Status   01/26/2024 16 10 - 44 U/L Final     Anion Gap   Date Value Ref Range Status   12/02/2024 10 8 - 16 mmol/L Final     eGFR if    Date Value Ref Range Status   06/07/2022 >60.0 >60 mL/min/1.73 m^2 Final     eGFR if non    Date Value Ref Range Status   06/07/2022 >60.0 >60 mL/min/1.73 m^2 Final     Comment:     Calculation used to obtain the estimated glomerular filtration  rate (eGFR) is the CKD-EPI equation.        Lipids:   Cholesterol   Date Value Ref Range Status   10/24/2024 218 (H) 120 - 199 mg/dL Final     Comment:     The National Cholesterol Education Program (NCEP) has set  the  following guidelines (reference ranges) for Cholesterol:  Optimal.....................<200 mg/dL  Borderline High.............200-239 mg/dL  High........................> or = 240 mg/dL       Triglycerides   Date Value Ref Range Status   10/24/2024 56 30 - 150 mg/dL Final     Comment:     The National Cholesterol Education Program (NCEP) has set the  following guidelines (reference values) for triglycerides:  Normal......................<150 mg/dL  Borderline High.............150-199 mg/dL  High........................200-499 mg/dL       HDL   Date Value Ref Range Status   10/24/2024 68 40 - 75 mg/dL Final     Comment:     The National Cholesterol Education Program (NCEP) has set the  following guidelines (reference values) for HDL Cholesterol:  Low...............<40 mg/dL  Optimal...........>60 mg/dL       LDL Cholesterol   Date Value Ref Range Status   10/24/2024 138.8 63.0 - 159.0 mg/dL Final     Comment:     The National Cholesterol Education Program (NCEP) has set the  following guidelines (reference values) for LDL Cholesterol:  Optimal.......................<130 mg/dL  Borderline High...............130-159 mg/dL  High..........................160-189 mg/dL  Very High.....................>190 mg/dL       HDL/Cholesterol Ratio   Date Value Ref Range Status   10/24/2024 31.2 20.0 - 50.0 % Final     Total Cholesterol/HDL Ratio   Date Value Ref Range Status   10/24/2024 3.2 2.0 - 5.0 Final     Non-HDL Cholesterol   Date Value Ref Range Status   10/24/2024 150 mg/dL Final     Comment:     Risk category and Non-HDL cholesterol goals:  Coronary heart disease (CHD)or equivalent (10-year risk of CHD >20%):  Non-HDL cholesterol goal     <130 mg/dL  Two or more CHD risk factors and 10-year risk of CHD <= 20%:  Non-HDL cholesterol goal     <160 mg/dL  0 to 1 CHD risk factor:  Non-HDL cholesterol goal     <190 mg/dL       A1C:   Lab Results   Component Value Date    HGBA1C 5.2 10/24/2024       Assessment & Plan    Patient is taking Cymbalta, Vyvance, Vralar and Auveltiy  Vyvanse side effects   Heart rate: Vyvanse can increase heart rate, which can be a problem for people with heart conditions.  Sleep: Vyvanse can cause insomnia, or trouble sleeping.  Appetite: Vyvanse can suppress appetite, which can lead to weight loss.  Mood: Vyvanse can cause mood swings, irritability, anxiety, and restlessness.  Nausea: Vyvanse can cause nausea and stomach discomfort, especially if taken on an empty stomach.  Dry mouth: Vyvanse can cause dry mouth, which can be helped by staying hydrated or chewing sugar-free gum.  Sweating: Vyvanse can cause excessive sweating, especially during physical activities or in warmer environments.  Taking cymbalta, Vyvanse, Vralar and Auveltiy can interact and increase the risk of side effects, including serotonin syndrome including anxiety, racing thoughts, restlessness, confusion, hallucinations, seixure, increased heart rate, fever, and excessive sweating  Many of theses symptoms are similar to menopause symptoms         Vasomotor Symptoms   ? Discussed estrogen therapy for hot flashes: delivery methods (oral, transdermal, topical), risks (e.g., VTE, breast cancer, stroke), and benefits (relief of vasomotor symptoms, bone health).   ? Rx: Estrogen 0.0375mg twice a week tailored to patient preference and risk profile.   ? Reviewed hysterectomy status to determine need for progesterone.   ? Discussed progesterone's dual role in maintaining uterine lining (if uterus intact) and addressing night sweats/sleep issues. Stressed the importance of consistent use to avoid breakthrough symptoms or endometrial issues.   -Rx progesterone 100mg qHS    Vaginal Dryness   ? Emphasized benefits of local vaginal estrogen for genitourinary syndrome of menopause (GSM)   ? Provided instructions for application (e.g., cream, ring, or tablet) and stressed consistent use for optimal results.   -Rx Estradiol cream 1g per vagina x  2 weeks, then 2x/week    Libido   ? will address at 3 month follow up    Sleep   ? Provided education on sleep hygiene: consistent sleep/wake times, limiting caffeine/alcohol, and avoiding screens before bed.   ? Encouraged stimulus control (bed for sleep and sex only) and relaxation techniques   -Follow up with sleep medicine for sleep study  ? Highlighted the impact of poor sleep on physical and mental health, including mood, memory, and overall quality of life.     Exercise   ? Reinforced importance of 150 minutes of moderate aerobic activity per week and resistance training twice weekly to counteract bone loss and maintain lean muscle mass.   ? Discussed the accelerated loss of bone and muscle during chandrika- and postmenopause and its role in reducing fracture risk and preserving physical function.     Diet   ? Reviewed protein intake recommendation (at least 0.8g/kg/day) for muscle maintenance, with a focus on high-quality protein sources   ? Recommended 1200mg calcium and 800-1000IU vitamin D daily to support bone health.   ? Encouraged hydration (at least 2L/day) to improve fatigue, cognition, and overall metabolic function.   ? Suggested a Mediterranean-style diet for cardiometabolic health, with an emphasis on whole grains, healthy fats, fruits, and vegetables.     Weight loss  -refer to Camila Martinez NP  -BMI 33    Additional Recommendations   ? Discussed routine follow-up to assess symptom management and adjust hormone therapy or lifestyle interventions as needed.

## 2025-02-05 ENCOUNTER — PATIENT OUTREACH (OUTPATIENT)
Dept: ADMINISTRATIVE | Facility: HOSPITAL | Age: 50
End: 2025-02-05
Payer: COMMERCIAL

## 2025-02-05 NOTE — PROGRESS NOTES
HM updated with mammogram  HM, care everywhere, immunizations updated  Chart review complete    Health Maintenance Due   Topic Date Due    Influenza Vaccine (1) 09/01/2024    COVID-19 Vaccine (5 - 2024-25 season) 09/01/2024

## 2025-03-04 ENCOUNTER — PATIENT MESSAGE (OUTPATIENT)
Dept: SLEEP MEDICINE | Facility: CLINIC | Age: 50
End: 2025-03-04
Payer: COMMERCIAL

## 2025-03-04 ENCOUNTER — PATIENT MESSAGE (OUTPATIENT)
Dept: PRIMARY CARE CLINIC | Facility: CLINIC | Age: 50
End: 2025-03-04
Payer: COMMERCIAL

## 2025-03-05 ENCOUNTER — PATIENT MESSAGE (OUTPATIENT)
Dept: SLEEP MEDICINE | Facility: CLINIC | Age: 50
End: 2025-03-05
Payer: COMMERCIAL

## 2025-03-10 ENCOUNTER — OFFICE VISIT (OUTPATIENT)
Dept: PRIMARY CARE CLINIC | Facility: CLINIC | Age: 50
End: 2025-03-10
Payer: COMMERCIAL

## 2025-03-10 DIAGNOSIS — B35.1 TOENAIL FUNGUS: Primary | ICD-10-CM

## 2025-03-10 NOTE — PROGRESS NOTES
[facility] Note    Chief Complaint      Chief Complaint   Patient presents with    Nail Problem       History of Present Illness      Desiree Salas is a 49 y.o. female who presents today via virtual visit for   Chief Complaint   Patient presents with    Nail Problem         HPI   Reports toenail fungus present for an uncertain amount of time.  No other complaints at this time.  She denies any trauma to her toe.      Family History:  family history includes Alzheimer's disease in her maternal grandmother; Cancer in her brother; Diabetes in her father and mother; Hypertension in her father; Migraines in her sister.   Family history was reviewed with patient.     Medications:  Encounter Medications[1]    Allergies:  Review of patient's allergies indicates:   Allergen Reactions    Grass pollen Other (See Comments)    House dust Itching       Health Maintenance:  Health Maintenance   Topic Date Due    Influenza Vaccine (1) 09/01/2024    COVID-19 Vaccine (5 - 2024-25 season) 09/01/2024    Mammogram  07/02/2025    Hemoglobin A1c (Diabetic Prevention Screening)  10/24/2027    Colorectal Cancer Screening  10/14/2028    Cervical Cancer Screening  01/26/2029    Lipid Panel  10/24/2029    TETANUS VACCINE  06/07/2032    RSV Vaccine (Age 60+ and Pregnant patients) (1 - 1-dose 75+ series) 05/07/2052    Hepatitis C Screening  Completed    HIV Screening  Completed    Pneumococcal Vaccines (Age 0-49)  Aged Out     Health Maintenance Topics with due status: Not Due       Topic Last Completion Date    TETANUS VACCINE 06/07/2022    Colorectal Cancer Screening 10/14/2023    Cervical Cancer Screening 01/26/2024    Mammogram 07/02/2024    Hemoglobin A1c (Diabetic Prevention Screening) 10/24/2024    Lipid Panel 10/24/2024    RSV Vaccine (Age 60+ and Pregnant patients) Not Due       Physical Exam       ]        Assessment/Plan     Desiree Salas is a 49 y.o.female with:    Toenail fungus  -     Ambulatory referral/consult  to Podiatry; Future; Expected date: 03/17/2025  -     Comprehensive Metabolic Panel; Future; Expected date: 03/10/2025        As above, continue current medications and maintain follow up with specialists.  Return to clinic as needed.    Greater than 50% of this time was spent face to face via virtual visit with patient.  All questions were answered to patient's satisfaction.    The patient location is: home  The chief complaint leading to consultation is: toenail fungus    Visit type: audiovisual    Face to Face time with patient:   Five minutes of total time spent on the encounter, which includes face to face time and non-face to face time preparing to see the patient (eg, review of tests), Obtaining and/or reviewing separately obtained history, Documenting clinical information in the electronic or other health record, Independently interpreting results (not separately reported) and communicating results to the patient/family/caregiver, or Care coordination (not separately reported).         Each patient to whom he or she provides medical services by telemedicine is:  (1) informed of the relationship between the physician and patient and the respective role of any other health care provider with respect to management of the patient; and (2) notified that he or she may decline to receive medical services by telemedicine and may withdraw from such care at any time.           Karen L Spencer, NP-C [facility]    Answers submitted by the patient for this visit:  Review of Systems Questionnaire (Submitted on 3/7/2025)  activity change: No  unexpected weight change: Yes  neck pain: No  hearing loss: Yes  rhinorrhea: No  trouble swallowing: No  eye discharge: Yes  visual disturbance: No  chest tightness: No  wheezing: No  chest pain: No  palpitations: No  blood in stool: No  constipation: No  vomiting: No  diarrhea: Yes  polydipsia: No  polyuria: Yes  difficulty urinating: No  hematuria: No  menstrual problem:  Yes  dysuria: No  joint swelling: No  arthralgias: Yes  headaches: Yes  weakness: Yes  confusion: Yes  dysphoric mood: Yes         [1]   Outpatient Encounter Medications as of 3/10/2025   Medication Sig Dispense Refill    AUVELITY  mg TbIE TAKE 1 TABLET BY MOUTH EVERY MORNING FOR 3 DAYS THEN TAKE 1 TABLET BY MOUTH TWICE DAILY      diphenhydrAMINE-acetaminophen (TYLENOL PM EXTRA STRENGTH)  mg Tab Take 1 tablet by mouth nightly as needed.      DULoxetine (CYMBALTA) 30 MG capsule Take 2 capsules (60 mg total) by mouth once daily.      estradioL (ESTRACE) 0.01 % (0.1 mg/gram) vaginal cream Place 1 g vaginally once daily. Place 1 gram vaginally daily for two weeks, then place 1 gram vaginally twice a week 42 g 4    estradioL (VIVELLE-DOT) 0.0375 mg/24 hr Place 1 patch onto the skin twice a week. 8 patch 11    progesterone (PROMETRIUM) 100 MG capsule Take 1 capsule (100 mg total) by mouth nightly. 90 capsule 3    VYVANSE 50 mg capsule Take 1 capsule (50 mg total) by mouth every morning. 30 capsule 0     No facility-administered encounter medications on file as of 3/10/2025.

## 2025-03-11 ENCOUNTER — PATIENT MESSAGE (OUTPATIENT)
Facility: CLINIC | Age: 50
End: 2025-03-11
Payer: COMMERCIAL

## 2025-03-14 ENCOUNTER — OFFICE VISIT (OUTPATIENT)
Dept: OBSTETRICS AND GYNECOLOGY | Facility: CLINIC | Age: 50
End: 2025-03-14
Payer: COMMERCIAL

## 2025-03-14 VITALS
HEART RATE: 62 BPM | HEIGHT: 63 IN | WEIGHT: 191 LBS | SYSTOLIC BLOOD PRESSURE: 109 MMHG | DIASTOLIC BLOOD PRESSURE: 72 MMHG | BODY MASS INDEX: 33.84 KG/M2

## 2025-03-14 DIAGNOSIS — N95.1 PERIMENOPAUSE: Primary | ICD-10-CM

## 2025-03-14 DIAGNOSIS — E66.9 OBESITY, UNSPECIFIED CLASS, UNSPECIFIED OBESITY TYPE, UNSPECIFIED WHETHER SERIOUS COMORBIDITY PRESENT: ICD-10-CM

## 2025-03-14 PROCEDURE — 99999 PR PBB SHADOW E&M-EST. PATIENT-LVL III: CPT | Mod: PBBFAC,,, | Performed by: PHYSICIAN ASSISTANT

## 2025-03-14 RX ORDER — TIRZEPATIDE 2.5 MG/.5ML
2.5 INJECTION, SOLUTION SUBCUTANEOUS
Qty: 4 PEN | Refills: 3 | Status: SHIPPED | OUTPATIENT
Start: 2025-03-14

## 2025-03-14 NOTE — PROGRESS NOTES
"Subjective:      Desiree Salas is a 49 y.o. female who presents to discuss weight loss. Reports 20lb central abdominal weight gain over the last year with starting new psych medication. She has changed her medications. However, she is struggling to lose the weight. She has struggled with weight in the past but has been able to control with reduced consumption. She is "hating her body." She has never taken a medication before for weight loss. She is interested in starting a medication for weight loss.  She is struggling to fall asleep and waking in the night with hip pain. She does see psychiatry.   She is perimenopausal on HRT with Dr. Marquis.    Routine Screening Labs: 10/24/2024    Sleep: Poor. See above     Stress: Exteremly high    Exercise: None- does not like     A typical day consists of:  Breakfast: coffee with protein shake  Lunch: skips  Dinner: mom or sister cooks- chicken, pasta, rice and peas; varies  Snack: some at night- cashes, pistachios, cereal, chips, rice pudding  Beverages: water 48oz daily. Alcohol- 2oz of tequila or rum night      Patient Outreach on 02/05/2025   Component Date Value Ref Range Status    BCS Recommendation External 07/02/2024 Repeat mammogram in 1 year   Final       Past Medical History:   Diagnosis Date    ADHD (attention deficit hyperactivity disorder), inattentive type     Anxiety     Asthma     exercise induced; no inhaler use    Depression     Pituitary tumor      Past Surgical History:   Procedure Laterality Date    COLONOSCOPY N/A 10/14/2023    Procedure: COLONOSCOPY;  Surgeon: Storm Poole MD;  Location: [facility] ([address] [place] [address]);  Service: Endoscopy;  Laterality: N/A;  inst to email  9/11 r/s, PEG, updated instr. emailed to [email or web address]  10/9-precall complete-MS  10/13-pt cannot come in earlier than 7am-Kpvt    CYST REMOVAL  2016    groin area    DILATION AND CURETTAGE OF UTERUS  1998    due to heavy/irregular menses    EXCISION OF GANGLION OF WRIST Left " "2018    Procedure: EXCISION, GANGLION CYST, WRIST LEFT;  Surgeon: Walter Joyner Jr., MD;  Location: [facility] OR;  Service: Plastics;  Laterality: Left;    MYOMECTOMY N/A 3/15/2019    Procedure: MYOMECTOMY OPEN;  Surgeon: Heena Castaneda MD;  Location: [facility] OR;  Service: OB/GYN;  Laterality: N/A;  Dr. Becerra to asst     Social History[1]  Family History   Problem Relation Name Age of Onset    Alzheimer's disease Maternal Grandmother      Diabetes Father      Hypertension Father      Diabetes Mother      Cancer Brother 1         unsure of the type    Migraines Sister 2     Breast cancer Neg Hx       OB History    Para Term  AB Living   1 0 0 0 1 0   SAB IAB Ectopic Multiple Live Births   1 0 0 0 0      # Outcome Date GA Lbr Brent/2nd Weight Sex Type Anes PTL Lv   1 SAB              Current Medications[2]    Review of Systems:  General: No fever, chills, or weight loss.  Chest: No chest pain, shortness of breath, or palpitations.  Breast: No pain, masses, or nipple discharge.  Vulva: No pain, lesions, or itching.  Vagina: No relaxation, itching, discharge, or lesions.  Abdomen: No pain, nausea, vomiting, diarrhea, or constipation.  Urinary: No incontinence, nocturia, frequency, or dysuria.  Extremities:  No leg cramps, edema, or calf pain.  Neurologic: No headaches, dizziness, or visual changes.    Objective:     Vitals:    25 0820   BP: 109/72   Pulse: 62   Weight: 86.6 kg (191 lb)   Height: 5' 3" (1.6 m)   PainSc: 0-No pain     Body mass index is 33.83 kg/m².    PHYSICAL EXAM:  APPEARANCE: Well nourished, well developed, in no acute distress.  AFFECT: WNL, alert and oriented x 3  SKIN: No acne or hirsutism  CHEST: Good respiratory effect    Assessment:    Perimenopause    Obesity, unspecified class, unspecified obesity type, unspecified whether serious comorbidity present  -     tirzepatide, weight loss, (ZEPBOUND) 2.5 mg/0.5 mL PnIj; Inject 2.5 mg into the skin every 7 days.  Dispense: 4 " Pen; Refill: 3      BMR calculated at 1470 calories per day.  Plan:   Goal is to shift body composition to increase lean muscle to improve metabolism and protect from osteoporosis.   Developed meal plan with handout of preferred foods.  Encouraged lean protein with every meal.  Avoid skipping meals.  Goal of  100g of protein per day  Wide variety of fruits and vegetables. Limit starch to 1/3 cup or 1 piece of bread per meal.  Log in naif with goal of 9866-6883 calories a day  Reviewed benefits of strength workouts and will start strength workouts 3x per week.  Add mg glycinate 500mg QHS  Start Zepbound 2.5mg SC qweekly. Counseled on use.  Reviewed side effects and risks of medications. No family or personal history of thyroid cancer or pancreatitis. Denies underlying gallbladder issues.  Discussed that these are long term options for weight loss and risk of gaining weight back if discontinued   If not covered by insurance, we did discuss the option of compounded medications.  Continue working with psychiatry on mood and insomnia.  Follow up in 8 weeks.    Instructed patient to call if she experiences any side effects or has any questions.    I spent a total of 35 minutes on the day of the visit.This includes face to face time and non-face to face time preparing to see the patient (eg, review of tests), obtaining and/or reviewing separately obtained history, documenting clinical information in the electronic or other health record, independently interpreting results and communicating results to the patient/family/caregiver, or care coordinator.             [1]   Social History  Tobacco Use    Smoking status: Former     Current packs/day: 0.00     Types: Cigarettes     Start date: 2015     Quit date: 2017     Years since quittin.7    Smokeless tobacco: Never    Tobacco comments:     1 pack in a week   Substance Use Topics    Alcohol use: Yes    Drug use: Yes     Types: Marijuana     Comment: daily   [2]    Current Outpatient Medications:     coffee xt/phosphatidyl serine (NEUR[employer] ORIGINAL ORAL), Take by mouth., Disp: , Rfl:     AUVELITY  mg TbIE, TAKE 1 TABLET BY MOUTH EVERY MORNING FOR 3 DAYS THEN TAKE 1 TABLET BY MOUTH TWICE DAILY (Patient not taking: Reported on 3/14/2025), Disp: , Rfl:     DULoxetine (CYMBALTA) 30 MG capsule, Take 2 capsules (60 mg total) by mouth once daily., Disp: , Rfl:     estradioL (ESTRACE) 0.01 % (0.1 mg/gram) vaginal cream, Place 1 g vaginally once daily. Place 1 gram vaginally daily for two weeks, then place 1 gram vaginally twice a week, Disp: 42 g, Rfl: 4    estradioL (VIVELLE-DOT) 0.0375 mg/24 hr, Place 1 patch onto the skin twice a week., Disp: 8 patch, Rfl: 11    progesterone (PROMETRIUM) 100 MG capsule, Take 1 capsule (100 mg total) by mouth nightly., Disp: 90 capsule, Rfl: 3    tirzepatide, weight loss, (ZEPBOUND) 2.5 mg/0.5 mL PnIj, Inject 2.5 mg into the skin every 7 days., Disp: 4 Pen, Rfl: 3    VYVANSE 50 mg capsule, Take 1 capsule (50 mg total) by mouth every morning., Disp: 30 capsule, Rfl: 0

## 2025-03-20 ENCOUNTER — TELEPHONE (OUTPATIENT)
Dept: SLEEP MEDICINE | Facility: OTHER | Age: 50
End: 2025-03-20
Payer: COMMERCIAL

## 2025-03-21 ENCOUNTER — HOSPITAL ENCOUNTER (OUTPATIENT)
Dept: SLEEP MEDICINE | Facility: OTHER | Age: 50
Discharge: HOME | End: 2025-03-21
Attending: NURSE PRACTITIONER
Payer: COMMERCIAL

## 2025-03-21 DIAGNOSIS — R40.0 SOMNOLENCE: ICD-10-CM

## 2025-03-21 DIAGNOSIS — F51.01 PRIMARY INSOMNIA: ICD-10-CM

## 2025-03-21 DIAGNOSIS — R53.83 FATIGUE, UNSPECIFIED TYPE: ICD-10-CM

## 2025-03-21 DIAGNOSIS — R06.83 SNORING: ICD-10-CM

## 2025-03-21 PROCEDURE — 95800 SLP STDY UNATTENDED: CPT

## 2025-03-25 ENCOUNTER — OFFICE VISIT (OUTPATIENT)
Dept: PODIATRY | Facility: CLINIC | Age: 50
End: 2025-03-25
Payer: COMMERCIAL

## 2025-03-25 VITALS
HEIGHT: 63 IN | SYSTOLIC BLOOD PRESSURE: 118 MMHG | DIASTOLIC BLOOD PRESSURE: 74 MMHG | WEIGHT: 190.94 LBS | BODY MASS INDEX: 33.83 KG/M2 | HEART RATE: 93 BPM

## 2025-03-25 DIAGNOSIS — B35.1 TOENAIL FUNGUS: ICD-10-CM

## 2025-03-25 DIAGNOSIS — B35.1 ONYCHOMYCOSIS DUE TO DERMATOPHYTE: Primary | ICD-10-CM

## 2025-03-25 PROCEDURE — 3078F DIAST BP <80 MM HG: CPT | Mod: CPTII,S$GLB,, | Performed by: PODIATRIST

## 2025-03-25 PROCEDURE — 3074F SYST BP LT 130 MM HG: CPT | Mod: CPTII,S$GLB,, | Performed by: PODIATRIST

## 2025-03-25 PROCEDURE — 3008F BODY MASS INDEX DOCD: CPT | Mod: CPTII,S$GLB,, | Performed by: PODIATRIST

## 2025-03-25 PROCEDURE — 99999 PR PBB SHADOW E&M-EST. PATIENT-LVL III: CPT | Mod: PBBFAC,,, | Performed by: PODIATRIST

## 2025-03-25 PROCEDURE — 99204 OFFICE O/P NEW MOD 45 MIN: CPT | Mod: S$GLB,,, | Performed by: PODIATRIST

## 2025-03-25 PROCEDURE — 1159F MED LIST DOCD IN RCRD: CPT | Mod: CPTII,S$GLB,, | Performed by: PODIATRIST

## 2025-03-25 RX ORDER — CICLOPIROX 80 MG/ML
SOLUTION TOPICAL NIGHTLY
Qty: 6.6 ML | Refills: 11 | Status: SHIPPED | OUTPATIENT
Start: 2025-03-25

## 2025-03-25 NOTE — PROGRESS NOTES
Subjective:      Patient ID: Desiree Salas is a 49 y.o. female.    Chief Complaint: Fungus (Toenail fungus)    Thick discolored misshapen toenails.  Chronic condition worsening over a year or more.  This exacerbation has Gradual onset, worsening over the past year or more.  Aggravated by no particular factor.  No prior medical treatment.  No self-treatment.  Denies trauma and surgery both feet.    Review of Systems   Constitutional: Negative for chills, diaphoresis, fever, malaise/fatigue and night sweats.   Cardiovascular:  Negative for claudication, cyanosis, leg swelling and syncope.   Skin:  Positive for nail changes. Negative for color change, dry skin, rash, suspicious lesions and unusual hair distribution.   Musculoskeletal:  Negative for falls, joint pain, joint swelling, muscle cramps, muscle weakness and stiffness.   Gastrointestinal:  Negative for constipation, diarrhea, nausea and vomiting.   Neurological:  Negative for brief paralysis, disturbances in coordination, focal weakness, numbness, paresthesias, sensory change and tremors.           Objective:      Physical Exam  Constitutional:       General: She is not in acute distress.     Appearance: She is well-developed. She is not diaphoretic.   Cardiovascular:      Pulses:           Popliteal pulses are 2+ on the right side and 2+ on the left side.        Dorsalis pedis pulses are 2+ on the right side and 2+ on the left side.        Posterior tibial pulses are 2+ on the right side and 2+ on the left side.      Comments: Capillary refill 3 seconds all toes/distal feet, all toes/both feet warm to touch.      Negative lymphadenopathy bilateral popliteal fossa and tarsal tunnel.      Negavie lower extremity edema bilateral.    Musculoskeletal:      Right ankle: No swelling, deformity, ecchymosis or lacerations. Normal range of motion. Normal pulse.      Right Achilles Tendon: Normal. No defects. De La Fuente's test negative.   Lymphadenopathy:      Lower  Body: No right inguinal adenopathy. No left inguinal adenopathy.      Comments: Negative lymphadenopathy bilateral popliteal fossa and tarsal tunnel.    Negative lymphangitic streaking bilateral feet/ankles/legs.   Skin:     General: Skin is warm and dry.      Capillary Refill: Capillary refill takes 2 to 3 seconds.      Coloration: Skin is not pale.      Findings: No abrasion, bruising, burn, ecchymosis, erythema, laceration, lesion or rash.      Nails: There is no clubbing.      Comments:   Skin is normal age and health appropriate color, turgor, texture, and temperature bilateral lower extremities without ulceration, hyperpigmentation, discoloration, masses nodules or cords palpated.  No ecchymosis, erythema, edema, or cardinal signs of infection bilateral lower extremities.    Toenails 1st, 2nd, 3rd, 4th, 5th  bilateral are hypertrophic thickened 2-3 mm, dystrophic, discolored tanish brown with tan, gray crumbly subungual debris.  Tender to distal nail plate pressure, without periungual skin abnormality of each.     Neurological:      Mental Status: She is alert and oriented to person, place, and time.      Sensory: No sensory deficit.      Motor: No tremor, atrophy or abnormal muscle tone.      Gait: Gait normal.      Deep Tendon Reflexes:      Reflex Scores:       Patellar reflexes are 2+ on the right side and 2+ on the left side.       Achilles reflexes are 2+ on the right side and 2+ on the left side.  Psychiatric:         Behavior: Behavior is cooperative.           Assessment:       Encounter Diagnoses   Name Primary?    Toenail fungus     Onychomycosis due to dermatophyte Yes         Plan:       Desiree was seen today for fungus.    Diagnoses and all orders for this visit:    Onychomycosis due to dermatophyte    Toenail fungus  -     Ambulatory referral/consult to Podiatry    Other orders  -     ciclopirox (PENLAC) 8 % Soln; Apply topically nightly.      I counseled the patient on her conditions, their  implications and medical management.        Penlac    Dry well after hygiene.      Natural fiber socks changed mid day.      Discussed conservative treatment with shoes of adequate dimensions, material, and style to alleviate symptoms and delay or prevent surgical intervention.           Follow up if symptoms worsen or fail to improve.

## 2025-03-26 ENCOUNTER — RESULTS FOLLOW-UP (OUTPATIENT)
Dept: SLEEP MEDICINE | Facility: CLINIC | Age: 50
End: 2025-03-26

## 2025-04-03 ENCOUNTER — PATIENT MESSAGE (OUTPATIENT)
Dept: OBSTETRICS AND GYNECOLOGY | Facility: CLINIC | Age: 50
End: 2025-04-03
Payer: COMMERCIAL

## 2025-04-07 ENCOUNTER — OFFICE VISIT (OUTPATIENT)
Facility: CLINIC | Age: 50
End: 2025-04-07
Payer: COMMERCIAL

## 2025-04-07 VITALS
BODY MASS INDEX: 33.16 KG/M2 | DIASTOLIC BLOOD PRESSURE: 73 MMHG | SYSTOLIC BLOOD PRESSURE: 111 MMHG | HEART RATE: 82 BPM | WEIGHT: 187.19 LBS

## 2025-04-07 DIAGNOSIS — N89.8 VAGINAL DRYNESS: ICD-10-CM

## 2025-04-07 DIAGNOSIS — R68.82 LOW LIBIDO: ICD-10-CM

## 2025-04-07 DIAGNOSIS — G47.00 INSOMNIA, UNSPECIFIED TYPE: ICD-10-CM

## 2025-04-07 DIAGNOSIS — N95.1 PERIMENOPAUSAL VASOMOTOR SYMPTOMS: Primary | ICD-10-CM

## 2025-04-07 PROCEDURE — 3074F SYST BP LT 130 MM HG: CPT | Mod: CPTII,S$GLB,, | Performed by: OBSTETRICS & GYNECOLOGY

## 2025-04-07 PROCEDURE — 99999 PR PBB SHADOW E&M-EST. PATIENT-LVL III: CPT | Mod: PBBFAC,,, | Performed by: OBSTETRICS & GYNECOLOGY

## 2025-04-07 PROCEDURE — 3078F DIAST BP <80 MM HG: CPT | Mod: CPTII,S$GLB,, | Performed by: OBSTETRICS & GYNECOLOGY

## 2025-04-07 PROCEDURE — 1159F MED LIST DOCD IN RCRD: CPT | Mod: CPTII,S$GLB,, | Performed by: OBSTETRICS & GYNECOLOGY

## 2025-04-07 PROCEDURE — 3008F BODY MASS INDEX DOCD: CPT | Mod: CPTII,S$GLB,, | Performed by: OBSTETRICS & GYNECOLOGY

## 2025-04-07 PROCEDURE — 99213 OFFICE O/P EST LOW 20 MIN: CPT | Mod: S$GLB,,, | Performed by: OBSTETRICS & GYNECOLOGY

## 2025-04-07 RX ORDER — GARLIC 1000 MG
CAPSULE ORAL
COMMUNITY
Start: 2024-06-01

## 2025-04-07 NOTE — PROGRESS NOTES
[facility] Hormone Consult Preparation Sheet    Patient Name: Desiree Salas 49 y.o. is here for hormone follow up.  Patient was started on Estradiol patch 0.0375mg twice a week, Prometrium 100mg qHS and vaginal estrogen.  Patient states that she no longer has hot f lashes or night sweats.  Her LMP on 3/9/25 was much lighter and she has decreased pain.  She only took Tylenol twice in week which is a significant improvement that previous cycles. She states that she is consistently using the vaginal estrogen with good results.  Her libido and arousal has improved since starting MHT.  She states that she has a bedtime routine and has significantly improved her sleep hygiene.  She states that she is fatigued and has trouble staying asleep due to hip pain.  She is following up with her PCP regarding the hip pain.  She is overall happy with the results and would like to continue the current prescriptions.    Answers submitted by the patient for this visit:  Gynecologic Exam Questionnaire  (Submitted on 4/6/2025)  Chief Complaint: Gynecologic exam  genital itching: No  genital lesions: No  genital odor: No  genital rash: No  missed menses: No  pelvic pain: No  vaginal bleeding: No  vaginal discharge: No  Chronicity: chronic  Onset: more than 1 year ago  Frequency: constantly  Progression since onset: gradually worsening  Pain severity: mild  Pregnant now?: No  abdominal pain: No  anorexia: No  back pain: Yes  chills: No  constipation: No  diarrhea: Yes  discolored urine: No  dysuria: No  fever: No  flank pain: Yes  frequency: Yes  headaches: Yes  hematuria: No  nausea: Yes  painful intercourse: No  rash: No  urgency: Yes  vomiting: No  Passing clots?: No  Passing tissue?: No  Aggravated by: bowel movements, tactile pressure, urinating  treatments tried: acetaminophen, heating pad, NSAIDs  Improvement on treatment: mild  Sexual activity: not sexually active  Partner with STD symptoms: unknown  Birth  "control: nothing, abstinence, condoms  Menstrual history: regular  STD: Yes  abdominal surgery: No   section: No  Ectopic pregnancy: No  Endometriosis: No  herpes simplex: No  gynecological surgery: Yes  menorrhagia: Yes  metrorrhagia: Yes  miscarriage: Yes  ovarian cysts: No  perineal abscess: No  PID: No  terminated pregnancy: No  vaginosis: No      Provider: Haley Marquis  CC and Symptoms:   Chief Complaint   Patient presents with    Follow-up     Hormone Follow up       Patient History:  Problem List[1]    Patient Medications:  Current Outpatient Medications on File Prior to Visit   Medication Sig    acetaminophen/pyrilamine/caff (MIDOL COMPLETE ORAL)     AUVELITY  mg TbIE     B6/folic/B12/coffee/phosphatid (NEURIVA PLUS BRAIN PERFORMANCE ORAL)     DULoxetine (CYMBALTA) 30 MG capsule Take 2 capsules (60 mg total) by mouth once daily.    estradioL (ESTRACE) 0.01 % (0.1 mg/gram) vaginal cream Place 1 g vaginally once daily. Place 1 gram vaginally daily for two weeks, then place 1 gram vaginally twice a week    estradioL (VIVELLE-DOT) 0.0375 mg/24 hr Place 1 patch onto the skin twice a week.    garlic (GARLIC OIL) 1,000 mg Cap     prenatal vits62/FA/om3/dha/epa (PRENATAL GUMMY ORAL)     progesterone (PROMETRIUM) 100 MG capsule Take 1 capsule (100 mg total) by mouth nightly.    VYVANSE 50 mg capsule Take 1 capsule (50 mg total) by mouth every morning.    ciclopirox (PENLAC) 8 % Soln Apply topically nightly.    coffee xt/phosphatidyl serine (NEURIVA ORIGINAL ORAL) Take by mouth.    tirzepatide, weight loss, (ZEPBOUND) 2.5 mg/0.5 mL PnIj Inject 2.5 mg into the skin every 7 days.     No current facility-administered medications on file prior to visit.       Patient Imaging and Procedures  LMP: Patient's last menstrual period was 2025 (exact date).  PAP: 2024  HPV: No results found for: "HPV"  Mammo: 2024 WNL  Colonoscopy: Last Colonoscopy completed on 10/14/2023  BMI: Body mass index is " "33.16 kg/m².    Recent Labs:  Estradiol:   Estradiol   Date Value Ref Range Status   01/26/2024 18 See Text pg/mL Final     Comment:     Estradiol Reference Ranges (Female):  Follicular phase:  pg/mL  Midcycle:          pg/mL  Luteal phase:      pg/mL  Post-menopausal(Not on HRT): <10-28 pg/mL  Post-menopausal(On HRT): < pg/mL  Males: 11-44 pg/mL    The drug Fulvestrant (Faslodex) may interfere with the   assay leading to falsely elevated Estradiol results.    Patients treated with Mifepristone should not be tested with  the  or AliniintelloCut I Estradiol assay for up to two   weeks due to the interference of the drug in this assay.       Testosterone: No results found for: "TESTOSTERONE"  FSH:   Follicle Stimulating Hormone   Date Value Ref Range Status   01/26/2024 11.95 See Text mIU/mL Final     Comment:     Female Reference Ranges:  Follicular Phase.................3.03-8.08 mIU/mL  Midcycle Peak....................2.55-16.69 mIU/mL  Luteal Phase.....................1.38-5.47 mIU/mL  Postmenopausal...................26..41 mIU/mL  Male Reference Range:............0.95-11.95 mIU/mL       CBC:   Lab Results   Component Value Date    WBC 8.19 10/24/2024    HGB 12.7 10/24/2024    HCT 39.5 10/24/2024    MCV 87 10/24/2024     10/24/2024       CMP:  Sodium   Date Value Ref Range Status   12/02/2024 139 136 - 145 mmol/L Final     Potassium   Date Value Ref Range Status   12/02/2024 4.2 3.5 - 5.1 mmol/L Final     Chloride   Date Value Ref Range Status   12/02/2024 107 95 - 110 mmol/L Final     CO2   Date Value Ref Range Status   12/02/2024 22 (L) 23 - 29 mmol/L Final     Glucose   Date Value Ref Range Status   12/02/2024 97 70 - 110 mg/dL Final     BUN   Date Value Ref Range Status   12/02/2024 11 6 - 20 mg/dL Final     Creatinine   Date Value Ref Range Status   12/02/2024 1.1 0.5 - 1.4 mg/dL Final     Calcium   Date Value Ref Range Status   12/02/2024 9.2 8.7 - 10.5 mg/dL Final "     Total Protein   Date Value Ref Range Status   01/26/2024 7.7 6.0 - 8.4 g/dL Final     Albumin   Date Value Ref Range Status   01/26/2024 4.5 3.5 - 5.2 g/dL Final     Total Bilirubin   Date Value Ref Range Status   01/26/2024 0.4 0.1 - 1.0 mg/dL Final     Comment:     For infants and newborns, interpretation of results should be based  on gestational age, weight and in agreement with clinical  observations.    Premature Infant recommended reference ranges:  Up to 24 hours.............<8.0 mg/dL  Up to 48 hours............<12.0 mg/dL  3-5 days..................<15.0 mg/dL  6-29 days.................<15.0 mg/dL       Alkaline Phosphatase   Date Value Ref Range Status   01/26/2024 49 (L) 55 - 135 U/L Final     AST   Date Value Ref Range Status   01/26/2024 18 10 - 40 U/L Final     ALT   Date Value Ref Range Status   01/26/2024 16 10 - 44 U/L Final     Anion Gap   Date Value Ref Range Status   12/02/2024 10 8 - 16 mmol/L Final     eGFR if    Date Value Ref Range Status   06/07/2022 >60.0 >60 mL/min/1.73 m^2 Final     eGFR if non    Date Value Ref Range Status   06/07/2022 >60.0 >60 mL/min/1.73 m^2 Final     Comment:     Calculation used to obtain the estimated glomerular filtration  rate (eGFR) is the CKD-EPI equation.        Lipids:   Cholesterol   Date Value Ref Range Status   10/24/2024 218 (H) 120 - 199 mg/dL Final     Comment:     The National Cholesterol Education Program (NCEP) has set the  following guidelines (reference ranges) for Cholesterol:  Optimal.....................<200 mg/dL  Borderline High.............200-239 mg/dL  High........................> or = 240 mg/dL       Triglycerides   Date Value Ref Range Status   10/24/2024 56 30 - 150 mg/dL Final     Comment:     The National Cholesterol Education Program (NCEP) has set the  following guidelines (reference values) for triglycerides:  Normal......................<150 mg/dL  Borderline High.............150-199  mg/dL  High........................200-499 mg/dL       HDL   Date Value Ref Range Status   10/24/2024 68 40 - 75 mg/dL Final     Comment:     The National Cholesterol Education Program (NCEP) has set the  following guidelines (reference values) for HDL Cholesterol:  Low...............<40 mg/dL  Optimal...........>60 mg/dL       LDL Cholesterol   Date Value Ref Range Status   10/24/2024 138.8 63.0 - 159.0 mg/dL Final     Comment:     The National Cholesterol Education Program (NCEP) has set the  following guidelines (reference values) for LDL Cholesterol:  Optimal.......................<130 mg/dL  Borderline High...............130-159 mg/dL  High..........................160-189 mg/dL  Very High.....................>190 mg/dL       HDL/Cholesterol Ratio   Date Value Ref Range Status   10/24/2024 31.2 20.0 - 50.0 % Final     Total Cholesterol/HDL Ratio   Date Value Ref Range Status   10/24/2024 3.2 2.0 - 5.0 Final     Non-HDL Cholesterol   Date Value Ref Range Status   10/24/2024 150 mg/dL Final     Comment:     Risk category and Non-HDL cholesterol goals:  Coronary heart disease (CHD)or equivalent (10-year risk of CHD >20%):  Non-HDL cholesterol goal     <130 mg/dL  Two or more CHD risk factors and 10-year risk of CHD <= 20%:  Non-HDL cholesterol goal     <160 mg/dL  0 to 1 CHD risk factor:  Non-HDL cholesterol goal     <190 mg/dL       A1C:   Lab Results   Component Value Date    HGBA1C 5.2 10/24/2024       Assessment & Plan   Hormone therapy follow up  Patient doing well overall and is happy with the current HT and doses  VMS has resolved  Improved sleep hygiene  Recommended adding magnesium glycinate 500mg QHS or magnesium spray to help with sleep  PCP to address hip pain to improve wakefulness in the middle of the night and daytime fatigue  Libido has improved with HT and vaginal estrogen  Patient is going to follow up with Camila Martinez regarding GLP-1 and weight loss  All questions answered  RTO prn       [1]    Patient Active Problem List  Diagnosis    Anxiety and depression    Attention deficit hyperactivity disorder (ADHD), predominantly inattentive type    Ganglion cyst of wrist, left    S/P myomectomy    Right leg paresthesias    Chronic midline low back pain without sciatica    Chronic hip pain, right    Nasal septal deviation    Sensorineural hearing loss (SNHL) of both ears    Chronic pain of both knees    Hypercholesteremia    Snoring    Pituitary tumor    Elevated prolactin level    Brain vascular malformation    Fatigue

## 2025-05-11 ENCOUNTER — PATIENT MESSAGE (OUTPATIENT)
Facility: CLINIC | Age: 50
End: 2025-05-11
Payer: COMMERCIAL

## 2025-06-11 ENCOUNTER — PATIENT MESSAGE (OUTPATIENT)
Facility: CLINIC | Age: 50
End: 2025-06-11
Payer: COMMERCIAL

## 2025-06-12 NOTE — TELEPHONE ENCOUNTER
Tylenol is typically not recommended for cramps.  A non steroidal anti-inflammatory NSAID is going to be best such as Motrin Advil Aleve.  Use as directed on the bottole

## 2025-06-17 ENCOUNTER — PATIENT MESSAGE (OUTPATIENT)
Dept: ADMINISTRATIVE | Facility: HOSPITAL | Age: 50
End: 2025-06-17
Payer: COMMERCIAL

## 2025-06-18 ENCOUNTER — PATIENT OUTREACH (OUTPATIENT)
Dept: ADMINISTRATIVE | Facility: HOSPITAL | Age: 50
End: 2025-06-18
Payer: COMMERCIAL

## 2025-06-18 DIAGNOSIS — Z12.39 ENCOUNTER FOR SCREENING FOR MALIGNANT NEOPLASM OF BREAST, UNSPECIFIED SCREENING MODALITY: Primary | ICD-10-CM

## 2025-06-18 NOTE — PROGRESS NOTES
Campaigns follow up. Will be due for mammogram 7/1/25. Pt requested order faxed to [facility]. Order sent    HM, care everywhere, immunizations updated  Chart review complete

## 2025-06-24 ENCOUNTER — OFFICE VISIT (OUTPATIENT)
Dept: OBSTETRICS AND GYNECOLOGY | Facility: CLINIC | Age: 50
End: 2025-06-24
Payer: COMMERCIAL

## 2025-06-24 DIAGNOSIS — Z79.890 HORMONE REPLACEMENT THERAPY: ICD-10-CM

## 2025-06-24 DIAGNOSIS — N94.6 DYSMENORRHEA: Primary | ICD-10-CM

## 2025-06-24 PROCEDURE — 98005 SYNCH AUDIO-VIDEO EST LOW 20: CPT | Mod: 95,,, | Performed by: OBSTETRICS & GYNECOLOGY

## 2025-06-24 NOTE — PROGRESS NOTES
The patient location is: [place]  The chief complaint leading to consultation is: Dysmenorrhea (painful periods)    Visit type: audiovisual    Face to Face time with patient: 15 minutes  20 minutes minutes of total time spent on the encounter, which includes face to face time and non-face to face time preparing to see the patient (eg, review of tests), Obtaining and/or reviewing separately obtained history, Documenting clinical information in the electronic or other health record, Independently interpreting results (not separately reported) and communicating results to the patient/family/caregiver, or Care coordination (not separately reported).         Each patient to whom he or she provides medical services by telemedicine is:  (1) informed of the relationship between the physician and patient and the respective role of any other health care provider with respect to management of the patient; and (2) notified that he or she may decline to receive medical services by telemedicine and may withdraw from such care at any time.    Notes:               CC: Well woman exam    Desiree Salas is a 50 y.o. female  presents for well woman exam.  LMP: No LMP recorded..  No issues, problems, or complaints.      OB History          1    Para   0    Term   0       0    AB   1    Living   0         SAB   1    IAB   0    Ectopic   0    Multiple   0    Live Births   0               Gynecology   Past Medical History:   Diagnosis Date    Abnormal uterine bleeding 3 months ago    Period starts around Day 14 vs 28 & debilitating cramps    ADHD (attention deficit hyperactivity disorder), inattentive type     Anemia     Since childhood always borderline    Anxiety     Asthma     exercise induced; no inhaler use    Bleeding disorder menstrual only    Depression     Fibroid     Myoectomy 2019    Genital warts 1998    Hearing loss years    Infertility, female  failed IUI attempts     Osteoarthritis undiagnosed    Pituitary tumor     Rheumatoid arthritis undiagnosed    STD (sexually transmitted disease)      Past Surgical History:   Procedure Laterality Date    ABDOMINAL SURGERY  2019     SECTION  Myomectomy    2019 Dr Heena Castaneda    COLON SURGERY      Colonoscopy    COLONOSCOPY N/A 10/14/2023    Procedure: COLONOSCOPY;  Surgeon: Storm Poole MD;  Location: [facility] (4TH FLR);  Service: Endoscopy;  Laterality: N/A;  inst to email   r/s, PEG, updated instr. emailed to [email or web address]  10/9-precall complete-MS  10/13-pt cannot come in earlier than 7am-Kpvt    CYST REMOVAL      groin area    DILATION AND CURETTAGE OF UTERUS      due to heavy/irregular menses    EXCISION OF GANGLION OF WRIST Left 2018    Procedure: EXCISION, GANGLION CYST, WRIST LEFT;  Surgeon: Walter Joyner Jr., MD;  Location: [facility];  Service: Plastics;  Laterality: Left;    HIP SURGERY  not yet    JOINT REPLACEMENT      Cyst removed crom Left wrist    MYOMECTOMY N/A 03/15/2019    Procedure: MYOMECTOMY OPEN;  Surgeon: Heena Castaneda MD;  Location: [facility];  Service: OB/GYN;  Laterality: N/A;  Dr. Becerra to asst     Social History[1]  Family History   Problem Relation Name Age of Onset    Alzheimer's disease Maternal Grandmother      Diabetes Father E     Hypertension Father E     Diabetes Mother Y     Cancer Brother D         unsure of the type    Migraines Sister 2     Breast cancer Neg Hx           There were no vitals taken for this visit.      ROS  ROS       PHYSICAL EXAM:  Physical Exam       There are no diagnoses linked to this encounter.        Patient was counseled today on A.C.S. Pap guidelines and recommendations for yearly pelvic exams, mammograms and monthly self breast exams; to see her PCP for other health maintenance.     No follow-ups on file.    LMP 20205   6/10/25 lasted 5 days  Menses are once a month and last 5 days.  She  states that she has always had painful periods and has actually improved since starting HRT but is still having nausea or diarrhea  Discussed taking Aygestin for 21 days and off for 7 days to help regulate menses and decrease flow and cramping.  Also discussed Mirena IUD to help with flow and pain.  Discussed as eggs age the progesterone produced during ovulation is not as effective and she can have irregular menes, change in flow and cramping.  She is unable to take NSAIDs due to Cr 1.2 or Aleve due to damage to liver.               [1]  Social History  Socioeconomic History    Marital status: Single   Occupational History     Comment: Works at [employer] -    Tobacco Use    Smoking status: Former     Current packs/day: 0.00     Average packs/day: 0.2 packs/day for 2.0 years (0.4 ttl pk-yrs)     Types: Cigarettes     Start date: 2015     Quit date: 2017     Years since quittin.9    Smokeless tobacco: Never    Tobacco comments:     My 2 surgeries arent listed: cyst removal wrist & myomectomy   Substance and Sexual Activity    Alcohol use: Yes     Alcohol/week: 2.0 standard drinks of alcohol     Types: 2 Shots of liquor per week     Comment: About 2 drinks per week    Drug use: Yes     Frequency: 7.0 times per week     Types: Marijuana     Comment: daily    Sexual activity: Not Currently     Partners: Female, Male     Birth control/protection: Abstinence, Condom, None     Comment: Inactive for 2-3 years from men, since 2019 for women     Social Drivers of Health     Financial Resource Strain: Low Risk  (2025)    Overall Financial Resource Strain (CARDIA)     Difficulty of Paying Living Expenses: Not very hard   Food Insecurity: No Food Insecurity (2025)    Hunger Vital Sign     Worried About Running Out of Food in the Last Year: Never true     Ran Out of Food in the Last Year: Never true   Transportation Needs: No Transportation Needs (2025)    PRAPARE - Transportation      Lack of Transportation (Medical): No     Lack of Transportation (Non-Medical): No   Physical Activity: Inactive (4/6/2025)    Exercise Vital Sign     Days of Exercise per Week: 0 days     Minutes of Exercise per Session: 0 min   Stress: Stress Concern Present (4/6/2025)    [employer] - Occupational Stress Questionnaire     Feeling of Stress : Very much   Housing Stability: Low Risk  (4/6/2025)    Housing Stability Vital Sign     Unable to Pay for Housing in the Last Year: No     Number of Times Moved in the Last Year: 0     Homeless in the Last Year: No    none

## 2025-07-07 LAB — BCS RECOMMENDATION EXT: NORMAL

## 2025-07-09 DIAGNOSIS — Z79.890 HORMONE REPLACEMENT THERAPY: ICD-10-CM

## 2025-07-09 DIAGNOSIS — N94.6 DYSMENORRHEA: Primary | ICD-10-CM

## 2025-07-11 ENCOUNTER — PATIENT MESSAGE (OUTPATIENT)
Dept: OBSTETRICS AND GYNECOLOGY | Facility: CLINIC | Age: 50
End: 2025-07-11
Payer: COMMERCIAL

## 2025-07-11 ENCOUNTER — PATIENT OUTREACH (OUTPATIENT)
Dept: ADMINISTRATIVE | Facility: HOSPITAL | Age: 50
End: 2025-07-11
Payer: COMMERCIAL

## 2025-07-22 ENCOUNTER — PATIENT MESSAGE (OUTPATIENT)
Dept: PRIMARY CARE CLINIC | Facility: CLINIC | Age: 50
End: 2025-07-22
Payer: COMMERCIAL

## 2025-07-22 ENCOUNTER — PATIENT MESSAGE (OUTPATIENT)
Dept: OBSTETRICS AND GYNECOLOGY | Facility: CLINIC | Age: 50
End: 2025-07-22
Payer: COMMERCIAL

## 2025-07-25 ENCOUNTER — OFFICE VISIT (OUTPATIENT)
Dept: OBSTETRICS AND GYNECOLOGY | Facility: CLINIC | Age: 50
End: 2025-07-25
Payer: COMMERCIAL

## 2025-07-25 ENCOUNTER — PATIENT MESSAGE (OUTPATIENT)
Dept: SLEEP MEDICINE | Facility: CLINIC | Age: 50
End: 2025-07-25
Payer: COMMERCIAL

## 2025-07-25 DIAGNOSIS — M25.559 HIP PAIN, UNSPECIFIED LATERALITY: ICD-10-CM

## 2025-07-25 DIAGNOSIS — N95.1 PERIMENOPAUSAL VASOMOTOR SYMPTOMS: Primary | ICD-10-CM

## 2025-07-25 DIAGNOSIS — R52 BODY ACHES: ICD-10-CM

## 2025-07-25 PROCEDURE — 98005 SYNCH AUDIO-VIDEO EST LOW 20: CPT | Mod: 95,,, | Performed by: OBSTETRICS & GYNECOLOGY

## 2025-07-25 NOTE — PROGRESS NOTES
The patient location is: [place]  The chief complaint leading to consultation is: Hormone follow up    Visit type: audiovisual    Face to Face time with patient: 20  30 minutes of total time spent on the encounter, which includes face to face time and non-face to face time preparing to see the patient (eg, review of tests), Obtaining and/or reviewing separately obtained history, Documenting clinical information in the electronic or other health record, Independently interpreting results (not separately reported) and communicating results to the patient/family/caregiver, or Care coordination (not separately reported).         Each patient to whom he or she provides medical services by telemedicine is:  (1) informed of the relationship between the physician and patient and the respective role of any other health care provider with respect to management of the patient; and (2) notified that he or she may decline to receive medical services by telemedicine and may withdraw from such care at any time.    Notes:               CC: Hormone follow up    Desiree Salas is a 50 y.o. female  presents for hormone follow.  Patient is currently taking Estradiol 0.0375mg twice a week, Prometrium 100mg qHS and Estrace cream twice a week.  Patient states that her periods are irregular.  Her last menstrual period lasted 2 weeks.  She states that she also has worsening hip pain and body aches that were tolerable before she started HRT but was worse with HRT.  She stopped takin the estrogen and her pain has since improved.  We discussed that this may a potential side effect due to the estrogen, however, I would still recommend following up with PCP to rule out other possible causes.  Patient is experiencing a lot of stress at the time.  Explained that HRT has no effect on menstrual cycle and that her menses are irregular due to perimenopause.  She will continue to have irregular cycles until she goes into menopause or 1 year  without a menstrual cycle.  If she would like to try different options to help control her cycle then she could do Nexstellis which has a bio-identical estrogen with progesterone that is a contraceptive and helps with vasomotor symptoms and regulates her menstrual cycle.  The other option would be transdermal estrogen with Aygestin which is no a contraceptive but will have similar effect to Nexstellis.  Or progesterone only IUD with transdermal estrogen.  Patient was not interested in that at this time.  She will follow up with her PCP and keep me updated on the possible cause of body aches.    OB History          1    Para   0    Term   0       0    AB   1    Living   0         SAB   1    IAB   0    Ectopic   0    Multiple   0    Live Births   0               Gynecology   Past Medical History:   Diagnosis Date    Abnormal uterine bleeding 3 months ago    Period starts around Day 14 vs 28 & debilitating cramps    ADHD (attention deficit hyperactivity disorder), inattentive type     Anemia     Since childhood always borderline    Anxiety     Asthma     exercise induced; no inhaler use    Bleeding disorder menstrual only    Depression     Fibroid     Myoectomy 2019    Genital warts     Hearing loss years    Infertility, female  failed IUI attempts    Osteoarthritis undiagnosed    Pituitary tumor     Rheumatoid arthritis undiagnosed    STD (sexually transmitted disease)      Past Surgical History:   Procedure Laterality Date    ABDOMINAL SURGERY  2019     SECTION  Myomectomy    2019 Dr Heena Castaneda    COLON SURGERY      Colonoscopy    COLONOSCOPY N/A 10/14/2023    Procedure: COLONOSCOPY;  Surgeon: Storm Poole MD;  Location: [facility] ([address] [place] [address]);  Service: Endoscopy;  Laterality: N/A;  inst to email   r/s, PEG, updated instr. emailed to [email or web address]  10/9-precall complete-MS  10/13-pt cannot come in earlier than 7am-Kpvt    CYST REMOVAL       groin area    DILATION AND CURETTAGE OF UTERUS      due to heavy/irregular menses    EXCISION OF GANGLION OF WRIST Left 2018    Procedure: EXCISION, GANGLION CYST, WRIST LEFT;  Surgeon: Walter Joyner Jr., MD;  Location: [facility];  Service: Plastics;  Laterality: Left;    HIP SURGERY  not yet    JOINT REPLACEMENT      Cyst removed crom Left wrist    MYOMECTOMY N/A 03/15/2019    Procedure: MYOMECTOMY OPEN;  Surgeon: Heena Castaneda MD;  Location: [facility];  Service: OB/GYN;  Laterality: N/A;  Dr. Becerra to asst     Social History[1]  Family History   Problem Relation Name Age of Onset    Alzheimer's disease Maternal Grandmother      Diabetes Father E     Hypertension Father E     Diabetes Mother Y     Cancer Brother D         unsure of the type    Migraines Sister 2     Breast cancer Neg Hx           There were no vitals taken for this visit.                         [1]   Social History  Socioeconomic History    Marital status: Single   Occupational History     Comment: Works at [employer] -    Tobacco Use    Smoking status: Former     Current packs/day: 0.00     Average packs/day: 0.2 packs/day for 2.0 years (0.4 ttl pk-yrs)     Types: Cigarettes     Start date: 2015     Quit date: 2017     Years since quittin.0    Smokeless tobacco: Never    Tobacco comments:     My 2 surgeries arent listed: cyst removal wrist & myomectomy   Substance and Sexual Activity    Alcohol use: Yes     Alcohol/week: 2.0 standard drinks of alcohol     Types: 2 Shots of liquor per week     Comment: About 2 drinks per week    Drug use: Yes     Frequency: 7.0 times per week     Types: Marijuana     Comment: daily    Sexual activity: Not Currently     Partners: Female, Male     Birth control/protection: Abstinence, Condom, None     Comment: Inactive for 2-3 years from men, since 2019 for women     Social Drivers of Health     Financial Resource Strain: Low Risk  (2025)    Overall Financial Resource  Strain (CARDIA)     Difficulty of Paying Living Expenses: Not very hard   Food Insecurity: No Food Insecurity (4/6/2025)    Hunger Vital Sign     Worried About Running Out of Food in the Last Year: Never true     Ran Out of Food in the Last Year: Never true   Transportation Needs: No Transportation Needs (4/6/2025)    PRAPARE - Transportation     Lack of Transportation (Medical): No     Lack of Transportation (Non-Medical): No   Physical Activity: Inactive (4/6/2025)    Exercise Vital Sign     Days of Exercise per Week: 0 days     Minutes of Exercise per Session: 0 min   Stress: Stress Concern Present (4/6/2025)    [employer] [employer] - Occupational Stress Questionnaire     Feeling of Stress : Very much   Housing Stability: Low Risk  (4/6/2025)    Housing Stability Vital Sign     Unable to Pay for Housing in the Last Year: No     Number of Times Moved in the Last Year: 0     Homeless in the Last Year: No

## 2025-07-28 ENCOUNTER — PATIENT MESSAGE (OUTPATIENT)
Dept: SLEEP MEDICINE | Facility: CLINIC | Age: 50
End: 2025-07-28
Payer: COMMERCIAL

## 2025-07-28 DIAGNOSIS — G47.33 OBSTRUCTIVE SLEEP APNEA: Primary | ICD-10-CM

## 2025-08-11 ENCOUNTER — OFFICE VISIT (OUTPATIENT)
Dept: PRIMARY CARE CLINIC | Facility: CLINIC | Age: 50
End: 2025-08-11
Payer: COMMERCIAL

## 2025-08-11 DIAGNOSIS — M25.551 PAIN IN RIGHT HIP: ICD-10-CM

## 2025-08-11 DIAGNOSIS — Z59.71 INSURANCE COVERAGE PROBLEMS: Primary | ICD-10-CM

## 2025-08-11 DIAGNOSIS — Z79.890 HORMONE REPLACEMENT THERAPY (HRT): ICD-10-CM

## 2025-08-11 DIAGNOSIS — Z13.6 ENCOUNTER FOR SCREENING FOR CARDIOVASCULAR DISORDERS: ICD-10-CM

## 2025-08-11 DIAGNOSIS — N95.1 PERIMENOPAUSE: ICD-10-CM

## 2025-08-11 DIAGNOSIS — G47.33 OSA (OBSTRUCTIVE SLEEP APNEA): ICD-10-CM

## 2025-08-11 DIAGNOSIS — E78.2 MIXED HYPERLIPIDEMIA: ICD-10-CM

## 2025-08-11 PROCEDURE — 1160F RVW MEDS BY RX/DR IN RCRD: CPT | Mod: CPTII,95,, | Performed by: STUDENT IN AN ORGANIZED HEALTH CARE EDUCATION/TRAINING PROGRAM

## 2025-08-11 PROCEDURE — 98006 SYNCH AUDIO-VIDEO EST MOD 30: CPT | Mod: 95,,, | Performed by: STUDENT IN AN ORGANIZED HEALTH CARE EDUCATION/TRAINING PROGRAM

## 2025-08-11 PROCEDURE — 1159F MED LIST DOCD IN RCRD: CPT | Mod: CPTII,95,, | Performed by: STUDENT IN AN ORGANIZED HEALTH CARE EDUCATION/TRAINING PROGRAM

## 2025-08-12 ENCOUNTER — PATIENT MESSAGE (OUTPATIENT)
Dept: PRIMARY CARE CLINIC | Facility: CLINIC | Age: 50
End: 2025-08-12
Payer: COMMERCIAL

## 2025-08-12 ENCOUNTER — HOSPITAL ENCOUNTER (OUTPATIENT)
Dept: RADIOLOGY | Facility: HOSPITAL | Age: 50
Discharge: HOME | End: 2025-08-12
Attending: STUDENT IN AN ORGANIZED HEALTH CARE EDUCATION/TRAINING PROGRAM
Payer: COMMERCIAL

## 2025-08-12 DIAGNOSIS — H57.89 ABNORMAL EYE COLOR: Primary | ICD-10-CM

## 2025-08-12 DIAGNOSIS — Z59.71 INSURANCE COVERAGE PROBLEMS: ICD-10-CM

## 2025-08-12 DIAGNOSIS — M25.551 PAIN IN RIGHT HIP: ICD-10-CM

## 2025-08-12 PROCEDURE — 73521 X-RAY EXAM HIPS BI 2 VIEWS: CPT | Mod: 26,,, | Performed by: RADIOLOGY

## 2025-08-12 PROCEDURE — 73521 X-RAY EXAM HIPS BI 2 VIEWS: CPT | Mod: TC,PN

## 2025-08-13 PROBLEM — R94.4 DECREASED GFR: Status: ACTIVE | Noted: 2025-08-13

## 2025-08-13 PROBLEM — E83.51 HYPOCALCEMIA: Status: ACTIVE | Noted: 2025-08-13

## 2025-08-15 ENCOUNTER — HOSPITAL ENCOUNTER (OUTPATIENT)
Dept: RADIOLOGY | Facility: HOSPITAL | Age: 50
Discharge: HOME | End: 2025-08-15
Attending: STUDENT IN AN ORGANIZED HEALTH CARE EDUCATION/TRAINING PROGRAM
Payer: COMMERCIAL

## 2025-08-15 DIAGNOSIS — Z13.6 ENCOUNTER FOR SCREENING FOR CARDIOVASCULAR DISORDERS: ICD-10-CM

## 2025-08-15 DIAGNOSIS — E78.2 MIXED HYPERLIPIDEMIA: ICD-10-CM

## 2025-08-15 DIAGNOSIS — Z79.890 HORMONE REPLACEMENT THERAPY (HRT): ICD-10-CM

## 2025-08-15 PROCEDURE — 75571 CT HRT W/O DYE W/CA TEST: CPT | Mod: 26,,, | Performed by: RADIOLOGY

## 2025-08-15 PROCEDURE — 75571 CT HRT W/O DYE W/CA TEST: CPT | Mod: TC

## 2025-08-18 ENCOUNTER — OFFICE VISIT (OUTPATIENT)
Dept: PRIMARY CARE CLINIC | Facility: CLINIC | Age: 50
End: 2025-08-18
Payer: COMMERCIAL

## 2025-08-18 ENCOUNTER — LAB VISIT (OUTPATIENT)
Dept: LAB | Facility: HOSPITAL | Age: 50
End: 2025-08-18
Attending: STUDENT IN AN ORGANIZED HEALTH CARE EDUCATION/TRAINING PROGRAM
Payer: COMMERCIAL

## 2025-08-18 VITALS
HEART RATE: 90 BPM | OXYGEN SATURATION: 99 % | BODY MASS INDEX: 31.02 KG/M2 | HEIGHT: 64 IN | WEIGHT: 181.69 LBS | DIASTOLIC BLOOD PRESSURE: 74 MMHG | SYSTOLIC BLOOD PRESSURE: 122 MMHG

## 2025-08-18 DIAGNOSIS — R94.4 DECREASED GFR: ICD-10-CM

## 2025-08-18 DIAGNOSIS — H57.9 EYE PROBLEM: ICD-10-CM

## 2025-08-18 DIAGNOSIS — Z79.890 HORMONE REPLACEMENT THERAPY (HRT): ICD-10-CM

## 2025-08-18 DIAGNOSIS — H57.89 ABNORMAL EYE COLOR: Primary | ICD-10-CM

## 2025-08-18 DIAGNOSIS — N92.1 MENORRHAGIA WITH IRREGULAR CYCLE: ICD-10-CM

## 2025-08-18 DIAGNOSIS — H57.89 ABNORMAL EYE COLOR: ICD-10-CM

## 2025-08-18 LAB
ALBUMIN SERPL BCP-MCNC: 4 G/DL (ref 3.5–5.2)
ALP SERPL-CCNC: 41 UNIT/L (ref 40–150)
ALT SERPL W/O P-5'-P-CCNC: 23 UNIT/L (ref 0–55)
ANION GAP (OHS): 8 MMOL/L (ref 8–16)
AST SERPL-CCNC: 20 UNIT/L (ref 0–50)
BILIRUB SERPL-MCNC: 0.2 MG/DL (ref 0.1–1)
BUN SERPL-MCNC: 15 MG/DL (ref 6–20)
CALCIUM SERPL-MCNC: 9.2 MG/DL (ref 8.7–10.5)
CERULOPLASMIN SERPL-MCNC: 26 MG/DL (ref 15–45)
CHLORIDE SERPL-SCNC: 107 MMOL/L (ref 95–110)
CO2 SERPL-SCNC: 25 MMOL/L (ref 23–29)
CREAT SERPL-MCNC: 1 MG/DL (ref 0.5–1.4)
GFR SERPLBLD CREATININE-BSD FMLA CKD-EPI: >60 ML/MIN/1.73/M2
GLUCOSE SERPL-MCNC: 56 MG/DL (ref 70–110)
POTASSIUM SERPL-SCNC: 4 MMOL/L (ref 3.5–5.1)
PROT SERPL-MCNC: 6.7 GM/DL (ref 6–8.4)
SODIUM SERPL-SCNC: 140 MMOL/L (ref 136–145)

## 2025-08-18 PROCEDURE — 99999 PR PBB SHADOW E&M-EST. PATIENT-LVL IV: CPT | Mod: PBBFAC,,, | Performed by: STUDENT IN AN ORGANIZED HEALTH CARE EDUCATION/TRAINING PROGRAM

## 2025-08-18 PROCEDURE — 82525 ASSAY OF COPPER: CPT

## 2025-08-18 PROCEDURE — 3008F BODY MASS INDEX DOCD: CPT | Mod: CPTII,S$GLB,, | Performed by: STUDENT IN AN ORGANIZED HEALTH CARE EDUCATION/TRAINING PROGRAM

## 2025-08-18 PROCEDURE — 3078F DIAST BP <80 MM HG: CPT | Mod: CPTII,S$GLB,, | Performed by: STUDENT IN AN ORGANIZED HEALTH CARE EDUCATION/TRAINING PROGRAM

## 2025-08-18 PROCEDURE — 36415 COLL VENOUS BLD VENIPUNCTURE: CPT | Mod: PN

## 2025-08-18 PROCEDURE — 3074F SYST BP LT 130 MM HG: CPT | Mod: CPTII,S$GLB,, | Performed by: STUDENT IN AN ORGANIZED HEALTH CARE EDUCATION/TRAINING PROGRAM

## 2025-08-18 PROCEDURE — 1159F MED LIST DOCD IN RCRD: CPT | Mod: CPTII,S$GLB,, | Performed by: STUDENT IN AN ORGANIZED HEALTH CARE EDUCATION/TRAINING PROGRAM

## 2025-08-18 PROCEDURE — 82390 ASSAY OF CERULOPLASMIN: CPT

## 2025-08-18 PROCEDURE — 3044F HG A1C LEVEL LT 7.0%: CPT | Mod: CPTII,S$GLB,, | Performed by: STUDENT IN AN ORGANIZED HEALTH CARE EDUCATION/TRAINING PROGRAM

## 2025-08-18 PROCEDURE — 99214 OFFICE O/P EST MOD 30 MIN: CPT | Mod: S$GLB,,, | Performed by: STUDENT IN AN ORGANIZED HEALTH CARE EDUCATION/TRAINING PROGRAM

## 2025-08-18 PROCEDURE — 80053 COMPREHEN METABOLIC PANEL: CPT

## 2025-08-18 PROCEDURE — 1160F RVW MEDS BY RX/DR IN RCRD: CPT | Mod: CPTII,S$GLB,, | Performed by: STUDENT IN AN ORGANIZED HEALTH CARE EDUCATION/TRAINING PROGRAM

## 2025-08-18 RX ORDER — DULOXETIN HYDROCHLORIDE 60 MG/1
60 CAPSULE, DELAYED RELEASE ORAL
COMMUNITY
Start: 2025-07-11

## 2025-08-19 ENCOUNTER — PATIENT MESSAGE (OUTPATIENT)
Dept: OBSTETRICS AND GYNECOLOGY | Facility: CLINIC | Age: 50
End: 2025-08-19
Payer: COMMERCIAL

## 2025-08-19 ENCOUNTER — HOSPITAL ENCOUNTER (OUTPATIENT)
Dept: RADIOLOGY | Facility: HOSPITAL | Age: 50
Discharge: HOME | End: 2025-08-19
Attending: STUDENT IN AN ORGANIZED HEALTH CARE EDUCATION/TRAINING PROGRAM
Payer: COMMERCIAL

## 2025-08-19 DIAGNOSIS — H57.89 ABNORMAL EYE COLOR: ICD-10-CM

## 2025-08-19 DIAGNOSIS — H57.9 EYE PROBLEM: ICD-10-CM

## 2025-08-19 PROCEDURE — 76705 ECHO EXAM OF ABDOMEN: CPT | Mod: 26,,, | Performed by: RADIOLOGY

## 2025-08-19 PROCEDURE — 76705 ECHO EXAM OF ABDOMEN: CPT | Mod: TC

## 2025-08-20 ENCOUNTER — OFFICE VISIT (OUTPATIENT)
Dept: OBSTETRICS AND GYNECOLOGY | Facility: CLINIC | Age: 50
End: 2025-08-20
Attending: OBSTETRICS & GYNECOLOGY
Payer: COMMERCIAL

## 2025-08-20 VITALS
SYSTOLIC BLOOD PRESSURE: 110 MMHG | DIASTOLIC BLOOD PRESSURE: 60 MMHG | WEIGHT: 173.75 LBS | BODY MASS INDEX: 29.66 KG/M2 | HEIGHT: 64 IN

## 2025-08-20 DIAGNOSIS — Z01.419 WELL FEMALE EXAM WITH ROUTINE GYNECOLOGICAL EXAM: ICD-10-CM

## 2025-08-20 DIAGNOSIS — N93.9 ABNORMAL UTERINE BLEEDING: Primary | ICD-10-CM

## 2025-08-20 PROCEDURE — 99999 PR PBB SHADOW E&M-EST. PATIENT-LVL IV: CPT | Mod: PBBFAC,,, | Performed by: OBSTETRICS & GYNECOLOGY

## 2025-08-20 RX ORDER — MAGNESIUM GLUCONATE 27.5 (500)
TABLET ORAL ONCE
COMMUNITY

## 2025-08-22 LAB — W COPPER: 1080 UG/L

## 2025-08-29 ENCOUNTER — OFFICE VISIT (OUTPATIENT)
Dept: OBSTETRICS AND GYNECOLOGY | Facility: CLINIC | Age: 50
End: 2025-08-29
Payer: COMMERCIAL

## 2025-08-29 DIAGNOSIS — N95.1 INSOMNIA ASSOCIATED WITH MENOPAUSE: ICD-10-CM

## 2025-08-29 DIAGNOSIS — N95.1 PERIMENOPAUSAL VASOMOTOR SYMPTOMS: Primary | ICD-10-CM

## 2025-08-29 DIAGNOSIS — R53.83 FATIGUE, UNSPECIFIED TYPE: ICD-10-CM

## 2025-08-29 RX ORDER — ESTRADIOL 0.03 MG/D
1 FILM, EXTENDED RELEASE TRANSDERMAL
Qty: 8 PATCH | Refills: 11 | Status: SHIPPED | OUTPATIENT
Start: 2025-09-01

## 2025-09-02 ENCOUNTER — PATIENT MESSAGE (OUTPATIENT)
Dept: PRIMARY CARE CLINIC | Facility: CLINIC | Age: 50
End: 2025-09-02
Payer: COMMERCIAL

## (undated) DEVICE — SUT VICRYL PLUS 0 CT1 36IN

## (undated) DEVICE — SPLINT WRIST FOAM 8IN MD/LFT

## (undated) DEVICE — BANDAGE DERMACEA STRETCH 4X1IN

## (undated) DEVICE — SYR 10CC LUER LOCK

## (undated) DEVICE — SUT VICRYL CTD 2-0 GI 27 SH

## (undated) DEVICE — ELECTRODE REM PLYHSV RETURN 9

## (undated) DEVICE — SUT MCRYL PLUS 4-0 PS2 27IN

## (undated) DEVICE — BANDAGE DERMACEA 3IN STRETCH

## (undated) DEVICE — ELECTRODE NEEDLE 1IN

## (undated) DEVICE — ELECTRODE BLADE TEFLON 6

## (undated) DEVICE — GAUZE SPONGE 4X4 12PLY

## (undated) DEVICE — DRESSING XEROFORM FOIL PK 1X8

## (undated) DEVICE — DRESSING MEPILEX BORDR AG 4X10

## (undated) DEVICE — LUBRICANT SURGILUBE 2 OZ

## (undated) DEVICE — NDL HYPO REG 25G X 1 1/2

## (undated) DEVICE — APPLICATOR CHLORAPREP ORN 26ML

## (undated) DEVICE — SEE MEDLINE ITEM 152515

## (undated) DEVICE — SEE MEDLINE ITEM 152622

## (undated) DEVICE — SUT VICRYL 3-0 27 SH

## (undated) DEVICE — PAD CAST SPECIALIST STRL 4

## (undated) DEVICE — TRAY FOLEY 16FR INFECTION CONT

## (undated) DEVICE — SUT MONOCRYL PLUS 4-0 P3

## (undated) DEVICE — DRESSING ABSRBNT ISLAND 3.6X8

## (undated) DEVICE — SOL WATER STRL IRR 1000ML

## (undated) DEVICE — SYS CLSR DERMABOND PRINEO 22CM

## (undated) DEVICE — SEE MEDLINE ITEM 146308

## (undated) DEVICE — SUT 2/0 27IN PLAIN GUT CT

## (undated) DEVICE — SEE MEDLINE ITEM 146417

## (undated) DEVICE — WARMER DRAPE STERILE LF

## (undated) DEVICE — TOURNIQUET SB QC SP 18X4IN

## (undated) DEVICE — BANDAGE SOFFORM STER 2IN

## (undated) DEVICE — SUT CTD VICRYL 0 UND BR

## (undated) DEVICE — GLOVE BIOGEL SKINSENSE PI 6.5

## (undated) DEVICE — SOL 9P NACL IRR PIC IL

## (undated) DEVICE — UNDERGLOVES BIOGEL PI SIZE 8

## (undated) DEVICE — DRAPE LAP TIBURON 77X122IN

## (undated) DEVICE — SUT VICRYL PLUS 0 CT1 18IN

## (undated) DEVICE — PACK UPPER EXTREMITY BAPTIST

## (undated) DEVICE — GLOVE BIOGEL SKINSENSE PI 7.5

## (undated) DEVICE — SYR B-D DISP CONTROL 10CC100/C

## (undated) DEVICE — SEE MEDLINE ITEM 157117

## (undated) DEVICE — ALCOHOL 70% ISOP W/GREEN 16OZ

## (undated) DEVICE — SUT VICRYL 2-0 36 CT-1

## (undated) DEVICE — PAD UNDERPAD 30X30

## (undated) DEVICE — BANDAGE ACE NON LATEX 2IN

## (undated) DEVICE — CONTAINER SPECIMEN STRL 4OZ